# Patient Record
Sex: FEMALE | Race: WHITE | NOT HISPANIC OR LATINO | Employment: PART TIME | URBAN - METROPOLITAN AREA
[De-identification: names, ages, dates, MRNs, and addresses within clinical notes are randomized per-mention and may not be internally consistent; named-entity substitution may affect disease eponyms.]

---

## 2017-01-03 ENCOUNTER — TELEPHONE (OUTPATIENT)
Dept: CARDIOLOGY | Facility: CLINIC | Age: 37
End: 2017-01-03

## 2017-01-03 PROBLEM — R06.02 SOB (SHORTNESS OF BREATH): Status: ACTIVE | Noted: 2017-01-03

## 2017-01-03 PROBLEM — M79.89 LEFT LEG SWELLING: Status: ACTIVE | Noted: 2017-01-03

## 2017-01-03 PROBLEM — Z86.711 HISTORY OF PULMONARY EMBOLISM: Status: ACTIVE | Noted: 2017-01-03

## 2017-01-03 NOTE — TELEPHONE ENCOUNTER
Triage call from patient stating transferred from scheduling post her request for cardiology f/u - patient states complaint of elevated HR w/ activity, normalizes with rest; presyncope carrying laundry basket up stairs. Patient states PMD @ [place] and recent hysterectomy @ [facility] facility -records are in [facility].    Patient agrees to seek care with PMD urgently vs internist she states she would like to switch to vs Emergency room for symptoms.  Patient states she does not feel the need to seek Emergency room care but does agree to call or be seen by a Primary Provider urgently. Transferred to scheduling to also set up cardiology appt.  Carine Arrington RN

## 2017-01-04 ENCOUNTER — HOSPITAL ENCOUNTER (OUTPATIENT)
Dept: CARDIOLOGY | Facility: CLINIC | Age: 37
Discharge: HOME | End: 2017-01-04
Attending: INTERNAL MEDICINE | Admitting: INTERNAL MEDICINE
Payer: COMMERCIAL

## 2017-01-04 ENCOUNTER — OFFICE VISIT (OUTPATIENT)
Dept: FAMILY MEDICINE | Facility: CLINIC | Age: 37
End: 2017-01-04
Payer: COMMERCIAL

## 2017-01-04 VITALS
HEART RATE: 83 BPM | TEMPERATURE: 96.4 F | HEIGHT: 65 IN | WEIGHT: 271 LBS | BODY MASS INDEX: 45.15 KG/M2 | OXYGEN SATURATION: 99 % | SYSTOLIC BLOOD PRESSURE: 126 MMHG | DIASTOLIC BLOOD PRESSURE: 65 MMHG

## 2017-01-04 DIAGNOSIS — R00.0 TACHYCARDIA: ICD-10-CM

## 2017-01-04 DIAGNOSIS — R00.0 TACHYCARDIA: Primary | ICD-10-CM

## 2017-01-04 DIAGNOSIS — E04.9 ENLARGED THYROID: ICD-10-CM

## 2017-01-04 LAB — TSH SERPL DL<=0.005 MIU/L-ACNC: 3.4 MU/L (ref 0.4–4)

## 2017-01-04 PROCEDURE — 99000 SPECIMEN HANDLING OFFICE-LAB: CPT | Performed by: INTERNAL MEDICINE

## 2017-01-04 PROCEDURE — 84443 ASSAY THYROID STIM HORMONE: CPT | Mod: 90 | Performed by: INTERNAL MEDICINE

## 2017-01-04 PROCEDURE — 93227 XTRNL ECG REC<48 HR R&I: CPT | Performed by: INTERNAL MEDICINE

## 2017-01-04 PROCEDURE — 99214 OFFICE O/P EST MOD 30 MIN: CPT | Performed by: INTERNAL MEDICINE

## 2017-01-04 PROCEDURE — 93225 XTRNL ECG REC<48 HRS REC: CPT

## 2017-01-04 PROCEDURE — 36415 COLL VENOUS BLD VENIPUNCTURE: CPT | Performed by: INTERNAL MEDICINE

## 2017-01-04 RX ORDER — DABIGATRAN ETEXILATE 150 MG/1
150 CAPSULE ORAL 2 TIMES DAILY
Qty: 60 CAPSULE | COMMUNITY
Start: 2017-01-04 | End: 2019-12-09

## 2017-01-04 NOTE — PATIENT INSTRUCTIONS
Thank you for choosing [facility].  You may be receiving a survey in the mail from [facility] regarding your visit today.  Please take a few minutes to complete and return the survey to let us know how we are doing.      If you have questions or concerns, please contact us via Falcon App or you can contact your care team at [phone].    Our Clinic hours are:  Monday 6:40 am  to 7:00 pm  Tuesday -Friday 6:40 am to 5:00 pm    The [place] outpatient lab hours are:  Monday - Friday 6:10 am to 4:45 pm  Saturdays 7:00 am to 11:00 am  Appointments are required, call [phone]    If you have clinical questions after hours or would like to schedule an appointment,  call the clinic at [phone].

## 2017-01-04 NOTE — PROGRESS NOTES
SUBJECTIVE:                                                    Summer Smith is a 36 year old female who presents to clinic today for the following health issues:      CHEST PAIN/Patient is not currently having chest pain but has in the pasted   Patient has had rapid heart rate for the last 2-3 weeks      Onset: 2-3- weeks      Description: Patient was seen in the ER at [facility] on dec 22-23 for chest pain and SOB rapid heart rate was sent home after IV fluids and CT scan with neg PE   Location:  left side  Character: sharp with exercise    Radiation: under l/arm   Duration: 5 minutes     Intensity: moderate    Progression of Symptoms:  Worsening rapid heart rate     Accompanying Signs & Symptoms:  Shortness of breath: YES- with rapid heart rate  Sweating: no   Nausea/vomiting: no   Lightheadedness: YES  Palpitations: YES  Fever/Chills: no   Cough: yes several weeks   Heartburn: no     History:   Family history of heart disease YES- father   Tobacco use: no     Precipitating factors:   Worse with exertion: YES and standing up  Worse with deep breaths :  no   Related to food: no     Alleviating factors:  Patient will sit down and rest also Ibuprofen        Therapies Tried and outcome: Patient was seen in the ER [facility]- records obtained       Summer has been experiencing tachycardia with standing that seems to worsen the longer she stands.  This has been associated with SOB and sternal to left of sternal chest pain, but that has been less frequent recently.  She has had some near syncopal episodes while climbing the stairs.  She was seen in the ER at [facility]- records were reviewed.  They did an EKG, chest CT for PE, an lab work including lytes that were all normal.  They did not that her HR increased to the 130s with ambulation, so they gave her a 1L bolus of IVF thinking she may be dehydrated. She has been working on increasing her hydration since then.     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  She has May-Thurner syndrome.  She states she did have a brief episode of atrial fibrillation with RVR while having an angiogram done, and she feels like the tachycardia episodes started after that.      She was recently treated for a UTI and completed a course of nitrofurantoin.  Symptoms mostly improved but she did notice some slight hematuria this AM.      She notes that on her most recent CT scan, they noted her right thyroid was a bit enlarged, no discrete nodules mentioned.  She has not had any unusual fatigue, temp intolerance, weight change, bowel dysfunction, or unexpected skin dryness aside from that expected for winter.      She has had a mild cough over the past few weeks.     Problem list and histories reviewed & adjusted, as indicated.  Additional history: as documented    Current Outpatient Prescriptions   Medication Sig Dispense Refill     dabigatran ANTICOAGULANT (PRADAXA) 150 MG CAPS capsule Take 1 capsule (150 mg) by mouth 2 times daily Store in original 's bottle or blister pack; use within 120 days of opening. Do not crush or open capsule. 60 capsule      Cholecalciferol (VITAMIN D3) 3000 UNITS TABS        fluticasone (FLONASE) 50 MCG/ACT nasal spray Spray 1-2 sprays into both nostrils daily 16 g 0     albuterol (PROAIR HFA, PROVENTIL HFA, VENTOLIN HFA) 108 (90 BASE) MCG/ACT inhaler Inhale 2 puffs into the lungs every 6 hours       ferrous sulfate (IRON) 325 (65 FE) MG tablet Take by mouth daily (with breakfast)       order for DME Equipment being ordered: thigh high compression socks 25-35 mm Hg.  Please measure 1 each 11     Allergies   Allergen Reactions     Sulfa Drugs Hives     Vitamin K Anaphylaxis     Vit K IV only     Augmented Betamethasone Diprop [Betamethasone] Rash     Penicillin G Rash       ROS:  Constitutional, cardiovascular, pulmonary, gi and gu systems are negative, except as otherwise noted.    OBJECTIVE:     "                                                /65 mmHg  Pulse 83  Temp(Src) 96.4  F (35.8  C) (Tympanic)  Ht 5' 5\" (1.651 m)  Wt 271 lb (122.925 kg)  BMI 45.10 kg/m2  SpO2 99%  LMP 08/01/2016  Body mass index is 45.1 kg/(m^2).  GENERAL: healthy, alert and no distress  Neck: normal thyroid  RESP: lungs clear to auscultation - no rales, rhonchi or wheezes  CV: regular rate and rhythm, normal S1 S2, no S3 or S4, no murmur, click or rub, no peripheral edema and peripheral pulses strong         Orthostatic Vitals   BP Pulse Position Site Cuff Size Time Date   154/72 84 lying flat Left Arm Large 10:43 AM 1/4/2017      147/77 104 standing Left Arm Large 10:41 AM 1/4/2017   126/65 83 sitting Left Arm Large 10:41 AM 1/4/2017       ASSESSMENT/PLAN:                                                        1. Tachycardia    Orthostatic vitals were checked and did show some changes with positional change, but she has increased her hydration and does not have any reason to be dehydrated.  Heart sounds are normal.  EKG and lytes were not repeated since these were normal in her recent ER visit.  CT also negative for PE at that visit.  She is having symptoms multiple times per day, so we will do a 24 hours Holter monitor to see if she has any cardiac rhythm changes.  She already has an appointment scheduled with cardiology tomorrow as well, so we'll see if they have any other recommendations.     - Holter Monitor 24 hour - Adult; Future    2. Enlarged thyroid- right sided    Mild right thyroid enlargement seen on recent CT, no nodules.  She is not having any symptoms of thyroid dysfunction aside from possibly the arrhythmias.  Will check a TSH to make sure thyroid enlargement is not significant.     - TSH with free T4 reflex    Kalyan Hogan MD  [facility]  "

## 2017-01-04 NOTE — MR AVS SNAPSHOT
After Visit Summary   1/4/2017    Summer Smith    MRN: [id number]           Patient Information     Date Of Birth          1980        Visit Information        Provider Department      1/4/2017 10:00 AM Kalyan Hogan MD [facility]        Today's Diagnoses     Tachycardia    -  1     Enlarged thyroid           Care Instructions          Thank you for choosing [facility].  You may be receiving a survey in the mail from [facility] regarding your visit today.  Please take a few minutes to complete and return the survey to let us know how we are doing.      If you have questions or concerns, please contact us via Medikly or you can contact your care team at [phone].    Our Clinic hours are:  Monday 6:40 am  to 7:00 pm  Tuesday -Friday 6:40 am to 5:00 pm    The [place] outpatient lab hours are:  Monday - Friday 6:10 am to 4:45 pm  Saturdays 7:00 am to 11:00 am  Appointments are required, call [phone]    If you have clinical questions after hours or would like to schedule an appointment,  call the clinic at [phone].        Follow-ups after your visit        Your next 10 appointments already scheduled     Jan 05, 2017  1:30 PM   New Visit with Cleveland Rico MD   [facility] [place] ([facility])    [address] [place] [address] [id number] [phone]              Future tests that were ordered for you today     Open Future Orders        Priority Expected Expires Ordered    Holter Monitor 24 hour - Adult Routine  2/18/2017 1/4/2017            Who to contact     If you have questions or need follow up information about today's clinic visit or your schedule please contact [facility] directly at [phone].  Normal or non-critical lab and imaging results will be communicated to you by MyChart, letter or phone within 4 business days after the clinic has received the results. If you do not hear  "from us within 7 days, please contact the clinic through Crowd Cast or phone. If you have a critical or abnormal lab result, we will notify you by phone as soon as possible.  Submit refill requests through Crowd Cast or call your pharmacy and they will forward the refill request to us. Please allow 3 business days for your refill to be completed.          Additional Information About Your Visit        VigoharAdTrib Information     Crowd Cast gives you secure access to your electronic health record. If you see a primary care provider, you can also send messages to your care team and make appointments. If you have questions, please call your primary care clinic.  If you do not have a primary care provider, please call [phone] and they will assist you.        Care EveryWhere ID     This is your Care EveryWhere ID. This could be used by other organizations to access your [place] medical records  [id number]        Your Vitals Were     Pulse Temperature Height BMI (Body Mass Index) Pulse Oximetry Last Period    83 96.4  F (35.8  C) (Tympanic) 5' 5\" (1.651 m) 45.10 kg/m2 99% 08/01/2016       Blood Pressure from Last 3 Encounters:   01/04/17 126/65   12/27/16 137/84   11/25/16 139/73    Weight from Last 3 Encounters:   01/04/17 271 lb (122.925 kg)   12/27/16 275 lb (124.739 kg)   11/25/16 272 lb (123.378 kg)              We Performed the Following     TSH with free T4 reflex        Primary Care Provider Office Phone # Fax #    Lucille Guerrero DO [place] [phone]       [facility] [address] [address]        Thank you!     Thank you for choosing [facility]  for your care. Our goal is always to provide you with excellent care. Hearing back from our patients is one way we can continue to improve our services. Please take a few minutes to complete the written survey that you may receive in the mail after your visit with us. Thank you!             Your Updated Medication List - " Protect others around you: Learn how to safely use, store and throw away your medicines at www.disposemymeds.org.          This list is accurate as of: 1/4/17 10:55 AM.  Always use your most recent med list.                   Brand Name Dispense Instructions for use    albuterol 108 (90 BASE) MCG/ACT Inhaler    PROAIR HFA/PROVENTIL HFA/VENTOLIN HFA     Inhale 2 puffs into the lungs every 6 hours       dabigatran ANTICOAGULANT 150 MG Caps capsule    PRADAXA    60 capsule    Take 1 capsule (150 mg) by mouth 2 times daily Store in original 's bottle or blister pack; use within 120 days of opening. Do not crush or open capsule.       enoxaparin 120 MG/0.8ML injection   Generic drug:  enoxaparin      Inject 1 mg/kg Subcutaneous 2 times daily       ferrous sulfate 325 (65 FE) MG tablet    IRON     Take by mouth daily (with breakfast)       fluticasone 50 MCG/ACT spray    FLONASE    16 g    Spray 1-2 sprays into both nostrils daily       MACROBID 100 MG capsule   Generic drug:  nitrofurantoin (macrocrystal-monohydrate)      Take 100 mg by mouth 2 times daily       order for DME     1 each    Equipment being ordered: thigh high compression socks 25-35 mm Hg.  Please measure       Vitamin D3 3000 UNITS Tabs

## 2017-01-05 ENCOUNTER — OFFICE VISIT (OUTPATIENT)
Dept: CARDIOLOGY | Facility: CLINIC | Age: 37
End: 2017-01-05
Payer: COMMERCIAL

## 2017-01-05 VITALS
SYSTOLIC BLOOD PRESSURE: 124 MMHG | DIASTOLIC BLOOD PRESSURE: 90 MMHG | WEIGHT: 274 LBS | HEIGHT: 65 IN | HEART RATE: 80 BPM | BODY MASS INDEX: 45.65 KG/M2

## 2017-01-05 DIAGNOSIS — R07.89 OTHER CHEST PAIN: ICD-10-CM

## 2017-01-05 DIAGNOSIS — R06.02 SOB (SHORTNESS OF BREATH): Primary | ICD-10-CM

## 2017-01-05 PROCEDURE — 93000 ELECTROCARDIOGRAM COMPLETE: CPT | Performed by: INTERNAL MEDICINE

## 2017-01-05 PROCEDURE — 99204 OFFICE O/P NEW MOD 45 MIN: CPT | Performed by: INTERNAL MEDICINE

## 2017-01-05 ASSESSMENT — ASTHMA QUESTIONNAIRES: ACT_TOTALSCORE: 23

## 2017-01-05 NOTE — PROGRESS NOTES
HISTORY OF PRESENT ILLNESS:  It is a pleasure for me to see Mrs. Smith today for cardiac evaluation.  She is a very delightful 36-year-old lady with no previous cardiac history.  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  Just over a month ago, she underwent hysterectomy for menorrhagia.  Post-procedure, she had profuse bleeding and her wound was repacked.  Her warfarin was withheld and she had left DVT and a small pulmonary embolism.  When a venogram was performed, she was told she had transient atrial fibrillation, though I do not have any strips to examine.  Her rhythm today is definitely sinus.  She had a murmur diagnosed by her pediatrician.  However, she has had an echocardiogram which demonstrated trivial mitral regurgitation only.  I certainly did not hear any murmurs today.      What concerns her is increase in heart rate when she stands up.  She tells me her heart rate would go from the 80s to about 130 or higher just recently.  This lady used to exercise on a regular basis and her heart rates have been as low as 50 at rest.  I timed her heart rate today.  It is 84 with her sitting down.  On immediate standing, it increased to 108.  At two minutes of standing, it was 92 and at 5 minutes of standing it is back down to 84 again.  There was no significant drop in blood pressure which is around 120/80 between sitting and standing.  She experienced no dizziness.      She has had some episodes of chest pain and near-syncope as well.  Recently she carried her laundry up 2 flights of steps and to the top.  She had very transient chest pain substernally which lasted less than a minute.  This was associated with shortness of breath.  Prior to this episode, she was in the shower and she felt lightheaded and had to lay down.  There is no history of syncope.      IMPRESSION:   1.  Probable vasovagal syndrome.   2.  Physical deconditioning following a  recent illness.  She had hysterectomy, postoperative bleeding, postoperative DVT, small pulmonary embolism and possibly paroxysmal atrial fibrillation, though I could not substantiate the latter.   3.  May-Thurner syndrome, chronically on warfarin.   4.  Chest discomfort, probably musculoskeletal.      I did not detect any significant change in heart rate with posture.  I think POTS is less likely.  I do wonder if a lot of her symptoms may be just a result of her bodily adjustments following recent illness.  I will request a stress echocardiogram to exclude the small possibility that she may have coronary or structural heart disease.  She is wearing a Holter monitor at this time and I will be paying attention to its results.  If her tests come back negative, I do not think further cardiac workup is necessary.         MONICA FRANKEL MD, [facility]             D: 2017 14:35   T: 2017 16:01   MT: LIAM      Name:     KERMIT GOODEN   MRN:      [id number]        Account:      [id number]   :      1980           Service Date: 2017      Document: [id number]

## 2017-01-05 NOTE — Clinical Note
1/5/2017    Kalyan Hogan MD  [facility]   [address] [facility] [address]    RE: Summer Smith       Dear Colleague,    It is a pleasure for me to see Mrs. Smith today for cardiac evaluation.  She is a very delightful 36-year-old lady with no previous cardiac history.  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  Just over a month ago, she underwent hysterectomy for menorrhagia.  Post-procedure, she had profuse bleeding and her wound was repacked.  Her warfarin was withheld and she had left DVT and a small pulmonary embolism.  When a venogram was performed, she was told she had transient atrial fibrillation, though I do not have any strips to examine.  Her rhythm today is definitely sinus.  She had a murmur diagnosed by her pediatrician.  However, she has had an echocardiogram which demonstrated trivial mitral regurgitation only.  I certainly did not hear any murmurs today.      What concerns her is increase in heart rate when she stands up.  She tells me her heart rate would go from the 80s to about 130 or higher just recently.  This lady used to exercise on a regular basis and her heart rates have been as low as 50 at rest.  I timed her heart rate today.  It is 84 with her sitting down.  On immediate standing, it increased to 108.  At two minutes of standing, it was 92 and at 5 minutes of standing it is back down to 84 again.  There was no significant drop in blood pressure which is around 120/80 between sitting and standing.  She experienced no dizziness.      She has had some episodes of chest pain and near-syncope as well.  Recently she carried her laundry up 2 flights of steps and to the top.  She had very transient chest pain substernally which lasted less than a minute.  This was associated with shortness of breath.  Prior to this episode, she was in the shower and she felt lightheaded and had to lay down.  There is no  history of syncope.     Outpatient Encounter Prescriptions as of 1/5/2017   Medication Sig Dispense Refill     dabigatran ANTICOAGULANT (PRADAXA) 150 MG CAPS capsule Take 1 capsule (150 mg) by mouth 2 times daily Store in original 's bottle or blister pack; use within 120 days of opening. Do not crush or open capsule. 60 capsule      ferrous sulfate (IRON) 325 (65 FE) MG tablet Take by mouth daily (with breakfast)       Cholecalciferol (VITAMIN D3) 3000 UNITS TABS        fluticasone (FLONASE) 50 MCG/ACT nasal spray Spray 1-2 sprays into both nostrils daily 16 g 0     albuterol (PROAIR HFA, PROVENTIL HFA, VENTOLIN HFA) 108 (90 BASE) MCG/ACT inhaler Inhale 2 puffs into the lungs every 6 hours       order for DME Equipment being ordered: thigh high compression socks 25-35 mm Hg.  Please measure 1 each 11     No facility-administered encounter medications on file as of 1/5/2017.      IMPRESSION:   1.  Probable vasovagal syndrome.   2.  Physical deconditioning following a recent illness.  She had hysterectomy, postoperative bleeding, postoperative DVT, small pulmonary embolism and possibly paroxysmal atrial fibrillation, though I could not substantiate the latter.   3.  May-Thurner syndrome, chronically on warfarin.   4.  Chest discomfort, probably musculoskeletal.      I did not detect any significant change in heart rate with posture.  I think POTS is less likely.  I do wonder if a lot of her symptoms may be just a result of her bodily adjustments following recent illness.  I will request a stress echocardiogram to exclude the small possibility that she may have coronary or structural heart disease.  She is wearing a Holter monitor at this time and I will be paying attention to its results.  If her tests come back negative, I do not think further cardiac workup is necessary.     Again, thank you for allowing me to participate in the care of your patient.      Sincerely,    DR MONICA FRANKEL MD     [place]  [facility]

## 2017-01-05 NOTE — MR AVS SNAPSHOT
After Visit Summary   1/5/2017    Summer Smith    MRN: [id number]           Patient Information     Date Of Birth          1980        Visit Information        Provider Department      1/5/2017 1:30 PM Trisha, [place] MD [facility]        Today's Diagnoses     SOB (shortness of breath)    -  1     Other chest pain            Follow-ups after your visit        Future tests that were ordered for you today     Open Future Orders        Priority Expected Expires Ordered    Exercise Stress Echocardiogram Routine 1/12/2017 1/5/2018 1/5/2017    Holter Monitor 24 hour - Adult Routine  2/18/2017 1/4/2017            Who to contact     If you have questions or need follow up information about today's clinic visit or your schedule please contact [facility] directly at [phone].  Normal or non-critical lab and imaging results will be communicated to you by Mobile Roadiehart, letter or phone within 4 business days after the clinic has received the results. If you do not hear from us within 7 days, please contact the clinic through Mobile Roadiehart or phone. If you have a critical or abnormal lab result, we will notify you by phone as soon as possible.  Submit refill requests through Binpress or call your pharmacy and they will forward the refill request to us. Please allow 3 business days for your refill to be completed.          Additional Information About Your Visit        MyChart Information     Binpress gives you secure access to your electronic health record. If you see a primary care provider, you can also send messages to your care team and make appointments. If you have questions, please call your primary care clinic.  If you do not have a primary care provider, please call [phone] and they will assist you.        Care EveryWhere ID     This is your Care EveryWhere ID. This could be used by other organizations to access  "your [place] medical records  [id number]        Your Vitals Were     Pulse Height BMI (Body Mass Index) Last Period          80 1.651 m (5' 5\") 45.60 kg/m2 08/01/2016         Blood Pressure from Last 3 Encounters:   01/05/17 124/90   01/04/17 126/65   12/27/16 137/84    Weight from Last 3 Encounters:   01/05/17 124.286 kg (274 lb)   01/04/17 122.925 kg (271 lb)   12/27/16 124.739 kg (275 lb)              We Performed the Following     EKG 12-lead complete w/read - Clinics (performed today)        Primary Care Provider Office Phone # Fax #    Kalyan Hogan [phone]       [address] [address]        Thank you!     Thank you for choosing [facility] [place]  for your care. Our goal is always to provide you with excellent care. Hearing back from our patients is one way we can continue to improve our services. Please take a few minutes to complete the written survey that you may receive in the mail after your visit with us. Thank you!             Your Updated Medication List - Protect others around you: Learn how to safely use, store and throw away your medicines at www.disposemymeds.org.          This list is accurate as of: 1/5/17  2:14 PM.  Always use your most recent med list.                   Brand Name Dispense Instructions for use    albuterol 108 (90 BASE) MCG/ACT Inhaler    PROAIR HFA/PROVENTIL HFA/VENTOLIN HFA     Inhale 2 puffs into the lungs every 6 hours       dabigatran ANTICOAGULANT 150 MG Caps capsule    PRADAXA    60 capsule    Take 1 capsule (150 mg) by mouth 2 times daily Store in original 's bottle or blister pack; use within 120 days of opening. Do not crush or open capsule.       ferrous sulfate 325 (65 FE) MG tablet    IRON     Take by mouth daily (with breakfast)       fluticasone 50 MCG/ACT spray    FLONASE    16 g    Spray 1-2 sprays into both nostrils daily       order for DME     1 " each    Equipment being ordered: thigh high compression socks 25-35 mm Hg.  Please measure       Vitamin D3 3000 UNITS Tabs

## 2017-01-05 NOTE — PROGRESS NOTES
HPI and Plan:   See dictation    Orders Placed This Encounter   Procedures     EKG 12-lead complete w/read - Clinics (performed today)     Exercise Stress Echocardiogram       No orders of the defined types were placed in this encounter.       Encounter Diagnoses   Name Primary?     SOB (shortness of breath) Yes     Other chest pain        CURRENT MEDICATIONS:  Current Outpatient Prescriptions   Medication Sig Dispense Refill     dabigatran ANTICOAGULANT (PRADAXA) 150 MG CAPS capsule Take 1 capsule (150 mg) by mouth 2 times daily Store in original 's bottle or blister pack; use within 120 days of opening. Do not crush or open capsule. 60 capsule      ferrous sulfate (IRON) 325 (65 FE) MG tablet Take by mouth daily (with breakfast)       Cholecalciferol (VITAMIN D3) 3000 UNITS TABS        fluticasone (FLONASE) 50 MCG/ACT nasal spray Spray 1-2 sprays into both nostrils daily 16 g 0     albuterol (PROAIR HFA, PROVENTIL HFA, VENTOLIN HFA) 108 (90 BASE) MCG/ACT inhaler Inhale 2 puffs into the lungs every 6 hours       order for DME Equipment being ordered: thigh high compression socks 25-35 mm Hg.  Please measure 1 each 11       ALLERGIES     Allergies   Allergen Reactions     Sulfa Drugs Hives     Vitamin K Anaphylaxis     Vit K IV only     Augmented Betamethasone Diprop [Betamethasone] Rash     Penicillin G Rash       PAST MEDICAL HISTORY:  Past Medical History   Diagnosis Date     DVT (deep vein thrombosis) in pregnancy (H) 2007     Pulmonary embolism (H) 2007     no long term anticoagulation, no recurrence       PAST SURGICAL HISTORY:  Past Surgical History   Procedure Laterality Date     Stent       venous in leg     As ligatn short saphen       had great saphenous vein removal     Davinci hysterectomy total       tubes removed, ovaries in place       FAMILY HISTORY:  Family History   Problem Relation Age of Onset     DIABETES Father      Colon Cancer Maternal Grandfather      Liver Cancer Maternal  "Grandfather      CANCER Paternal Grandfather      Thyroid Disease Mother      Thyroid Disease Sister      Thyroid Disease Sister      Endocrine Disease Daughter      Lucille, has appointment end of July 2016       SOCIAL HISTORY:  Social History     Social History     Marital Status:      Spouse Name: N/A     Number of Children: N/A     Years of Education: N/A     Social History Main Topics     Smoking status: Never Smoker      Smokeless tobacco: Never Used     Alcohol Use: Yes     Drug Use: No     Sexual Activity:     Partners: Male     Birth Control/ Protection: Male Surgical     Other Topics Concern     Parent/Sibling W/ Cabg, Mi Or Angioplasty Before 65f 55m? No     Social History Narrative       Review of Systems:  Skin:  Negative     Eyes:  Positive for glasses  ENT:  Negative    Respiratory:  Positive for dyspnea on exertion  Cardiovascular:    Positive for;chest pain;palpitations;dizziness  Gastroenterology: Negative    Genitourinary:  Negative    Musculoskeletal:  Negative    Neurologic:  Negative    Psychiatric:  Negative    Heme/Lymph/Imm:  Negative    Endocrine:  Negative      Physical Exam:  Vitals: /90 mmHg  Pulse 80  Ht 1.651 m (5' 5\")  Wt 124.286 kg (274 lb)  BMI 45.60 kg/m2  LMP 08/01/2016    Constitutional:  cooperative, alert and oriented, well developed, well nourished, in no acute distress        Skin:  warm and dry to the touch, no apparent skin lesions or masses noted        Head:  normocephalic, no masses or lesions        Eyes:  pupils equal and round, conjunctivae and lids unremarkable, sclera white, no xanthalasma, EOMS intact, no nystagmus        ENT:  no pallor or cyanosis, dentition good        Neck:  carotid pulses are full and equal bilaterally, JVP normal, no carotid bruit, no thyromegaly        Chest:  normal breath sounds, clear to auscultation, normal A-P diameter, normal symmetry, normal respiratory excursion, no use of accessory muscles        Cardiac: regular " rhythm, normal S1/S2, no S3 or S4, apical impulse not displaced, no murmurs, gallops or rubs                  Abdomen:  abdomen soft, non-tender, BS normoactive, no mass, no HSM, no bruits        Vascular: pulses full and equal, no bruits auscultated                                      Extremities and Back:  no deformities, clubbing, cyanosis, erythema observed        Neurological:  affect appropriate, oriented to time, person and place          Recent Lab Results:  LIPID RESULTS:  No results found for: CHOL, HDL, LDL, TRIG, CHOLHDLRATIO    LIVER ENZYME RESULTS:  Lab Results   Component Value Date    AST 14 11/30/2016    ALT 20 11/30/2016       CBC RESULTS:  Lab Results   Component Value Date    WBC 8.7 11/25/2016    RBC 4.62 11/25/2016    HGB 12.6 11/25/2016    HCT 39.2 11/25/2016    MCV 85 11/25/2016    MCH 27.3 11/25/2016    MCHC 32.1 11/25/2016    RDW 12.5 11/25/2016     11/25/2016       BMP RESULTS:  Lab Results   Component Value Date     11/25/2016    POTASSIUM 4.6 11/30/2016    CHLORIDE 106 11/25/2016    CO2 27 11/25/2016    ANIONGAP 5 11/25/2016    GLC 98 11/30/2016    BUN 12 11/25/2016    CR 0.69 11/30/2016    GFRESTIMATED >60 11/30/2016    GFRESTBLACK >60 11/30/2016    SARAH 9.0 11/25/2016        A1C RESULTS:  No results found for: A1C    INR RESULTS:  Lab Results   Component Value Date    INR 0.99 11/30/2016    INR 1.87* 11/15/2016           CC  No referring provider defined for this encounter.

## 2017-01-17 ENCOUNTER — HOSPITAL ENCOUNTER (OUTPATIENT)
Dept: CARDIOLOGY | Facility: CLINIC | Age: 37
Discharge: HOME | End: 2017-01-17
Attending: INTERNAL MEDICINE | Admitting: INTERNAL MEDICINE
Payer: COMMERCIAL

## 2017-01-17 DIAGNOSIS — R06.02 SOB (SHORTNESS OF BREATH): ICD-10-CM

## 2017-01-17 DIAGNOSIS — R07.89 OTHER CHEST PAIN: ICD-10-CM

## 2017-01-17 PROCEDURE — 93325 DOPPLER ECHO COLOR FLOW MAPG: CPT | Mod: TC

## 2017-01-17 PROCEDURE — 93321 DOPPLER ECHO F-UP/LMTD STD: CPT | Mod: 26 | Performed by: INTERNAL MEDICINE

## 2017-01-17 PROCEDURE — 93016 CV STRESS TEST SUPVJ ONLY: CPT

## 2017-01-17 PROCEDURE — 93325 DOPPLER ECHO COLOR FLOW MAPG: CPT | Mod: 26 | Performed by: INTERNAL MEDICINE

## 2017-01-17 PROCEDURE — 93350 STRESS TTE ONLY: CPT | Mod: 26 | Performed by: INTERNAL MEDICINE

## 2017-01-17 PROCEDURE — 93018 CV STRESS TEST I&R ONLY: CPT | Performed by: INTERNAL MEDICINE

## 2017-01-18 ENCOUNTER — TELEPHONE (OUTPATIENT)
Dept: CARDIOLOGY | Facility: CLINIC | Age: 37
End: 2017-01-18

## 2017-01-18 NOTE — TELEPHONE ENCOUNTER
"Results of Stress Echo 1/17/17 and Holter monitor placed 1/4/17 reviewed by Dr. Rico. Per note from Dr. Rico, \"Pls let pt know results.  Normal stress echo in that there is no ischemia and LVEF is normal.  However, exercise tolerance is below average.  Holter reviewed.  No significant arrythmias, doubt afib.  Pt's symptoms likely non cardiac.  No cardiac follow up needed from us at this time.  Thanks.\" Call made to patient to update her with results and recommendations. She stated understanding and had no further questions or concerns at this time ANTONETTE Love RN    "

## 2017-06-20 ENCOUNTER — HOSPITAL ENCOUNTER (EMERGENCY)
Facility: CLINIC | Age: 37
Discharge: HOME | End: 2017-06-20
Attending: FAMILY MEDICINE | Admitting: FAMILY MEDICINE
Payer: COMMERCIAL

## 2017-06-20 ENCOUNTER — TELEPHONE (OUTPATIENT)
Dept: FAMILY MEDICINE | Facility: CLINIC | Age: 37
End: 2017-06-20

## 2017-06-20 ENCOUNTER — APPOINTMENT (OUTPATIENT)
Dept: MRI IMAGING | Facility: CLINIC | Age: 37
End: 2017-06-20
Attending: FAMILY MEDICINE
Payer: COMMERCIAL

## 2017-06-20 ENCOUNTER — APPOINTMENT (OUTPATIENT)
Dept: CT IMAGING | Facility: CLINIC | Age: 37
End: 2017-06-20
Attending: FAMILY MEDICINE
Payer: COMMERCIAL

## 2017-06-20 VITALS
DIASTOLIC BLOOD PRESSURE: 96 MMHG | OXYGEN SATURATION: 96 % | SYSTOLIC BLOOD PRESSURE: 139 MMHG | RESPIRATION RATE: 16 BRPM | TEMPERATURE: 98.1 F

## 2017-06-20 DIAGNOSIS — R20.2 FACIAL PARESTHESIA: ICD-10-CM

## 2017-06-20 LAB
ANION GAP SERPL CALCULATED.3IONS-SCNC: 6 MMOL/L (ref 3–14)
APTT PPP: 35 SEC (ref 22–37)
BASOPHILS # BLD AUTO: 0.1 10E9/L (ref 0–0.2)
BASOPHILS NFR BLD AUTO: 1.2 %
BUN SERPL-MCNC: 13 MG/DL (ref 7–30)
CALCIUM SERPL-MCNC: 8.8 MG/DL (ref 8.5–10.1)
CHLORIDE SERPL-SCNC: 104 MMOL/L (ref 94–109)
CO2 SERPL-SCNC: 25 MMOL/L (ref 20–32)
CREAT SERPL-MCNC: 0.63 MG/DL (ref 0.52–1.04)
DIFFERENTIAL METHOD BLD: ABNORMAL
EOSINOPHIL # BLD AUTO: 0.2 10E9/L (ref 0–0.7)
EOSINOPHIL NFR BLD AUTO: 3.1 %
ERYTHROCYTE [DISTWIDTH] IN BLOOD BY AUTOMATED COUNT: 13.4 % (ref 10–15)
GFR SERPL CREATININE-BSD FRML MDRD: NORMAL ML/MIN/1.7M2
GLUCOSE BLDC GLUCOMTR-MCNC: 97 MG/DL (ref 70–99)
GLUCOSE SERPL-MCNC: 92 MG/DL (ref 70–99)
HCT VFR BLD AUTO: 42.6 % (ref 35–47)
HGB BLD-MCNC: 13.5 G/DL (ref 11.7–15.7)
IMM GRANULOCYTES # BLD: 0 10E9/L (ref 0–0.4)
IMM GRANULOCYTES NFR BLD: 0.5 %
INR PPP: 0.97 (ref 0.86–1.14)
LYMPHOCYTES # BLD AUTO: 2.2 10E9/L (ref 0.8–5.3)
LYMPHOCYTES NFR BLD AUTO: 27.7 %
MCH RBC QN AUTO: 25.8 PG (ref 26.5–33)
MCHC RBC AUTO-ENTMCNC: 31.7 G/DL (ref 31.5–36.5)
MCV RBC AUTO: 82 FL (ref 78–100)
MONOCYTES # BLD AUTO: 0.7 10E9/L (ref 0–1.3)
MONOCYTES NFR BLD AUTO: 8.6 %
NEUTROPHILS # BLD AUTO: 4.6 10E9/L (ref 1.6–8.3)
NEUTROPHILS NFR BLD AUTO: 58.9 %
PLATELET # BLD AUTO: 326 10E9/L (ref 150–450)
POTASSIUM SERPL-SCNC: 4 MMOL/L (ref 3.4–5.3)
RBC # BLD AUTO: 5.23 10E12/L (ref 3.8–5.2)
SODIUM SERPL-SCNC: 135 MMOL/L (ref 133–144)
TROPONIN I SERPL-MCNC: NORMAL UG/L (ref 0–0.04)
WBC # BLD AUTO: 7.8 10E9/L (ref 4–11)

## 2017-06-20 PROCEDURE — 00000146 ZZHCL STATISTIC GLUCOSE BY METER IP

## 2017-06-20 PROCEDURE — 85730 THROMBOPLASTIN TIME PARTIAL: CPT | Performed by: FAMILY MEDICINE

## 2017-06-20 PROCEDURE — 80048 BASIC METABOLIC PNL TOTAL CA: CPT | Performed by: FAMILY MEDICINE

## 2017-06-20 PROCEDURE — 70450 CT HEAD/BRAIN W/O DYE: CPT

## 2017-06-20 PROCEDURE — 85025 COMPLETE CBC W/AUTO DIFF WBC: CPT | Performed by: FAMILY MEDICINE

## 2017-06-20 PROCEDURE — 99285 EMERGENCY DEPT VISIT HI MDM: CPT | Mod: 25 | Performed by: FAMILY MEDICINE

## 2017-06-20 PROCEDURE — 84484 ASSAY OF TROPONIN QUANT: CPT | Performed by: FAMILY MEDICINE

## 2017-06-20 PROCEDURE — A9585 GADOBUTROL INJECTION: HCPCS | Performed by: EMERGENCY MEDICINE

## 2017-06-20 PROCEDURE — 85610 PROTHROMBIN TIME: CPT | Performed by: FAMILY MEDICINE

## 2017-06-20 PROCEDURE — 86618 LYME DISEASE ANTIBODY: CPT | Performed by: FAMILY MEDICINE

## 2017-06-20 PROCEDURE — 93005 ELECTROCARDIOGRAM TRACING: CPT | Performed by: FAMILY MEDICINE

## 2017-06-20 PROCEDURE — 25000128 H RX IP 250 OP 636: Performed by: EMERGENCY MEDICINE

## 2017-06-20 PROCEDURE — 70553 MRI BRAIN STEM W/O & W/DYE: CPT

## 2017-06-20 PROCEDURE — 93010 ELECTROCARDIOGRAM REPORT: CPT | Performed by: FAMILY MEDICINE

## 2017-06-20 PROCEDURE — 70544 MR ANGIOGRAPHY HEAD W/O DYE: CPT

## 2017-06-20 RX ORDER — GADOBUTROL 604.72 MG/ML
12 INJECTION INTRAVENOUS ONCE
Status: COMPLETED | OUTPATIENT
Start: 2017-06-20 | End: 2017-06-20

## 2017-06-20 RX ADMIN — GADOBUTROL 12 ML: 604.72 INJECTION INTRAVENOUS at 14:14

## 2017-06-20 NOTE — DISCHARGE INSTRUCTIONS
CT without contrast was normal  MRI brain normal  MR angiogram brain normal  MR angiogram neck vessels normal      Lyme screen/test results pending.-May contact your primary clinic provider for results and 1-2 days.

## 2017-06-20 NOTE — TELEPHONE ENCOUNTER
Reason for Call:  Other facial numbness    Detailed comments: Patient states she has facial numbness.  This just started today. She is on blood thinners.  She did experience chest numbness this morning but that has now resolved.    Phone Number Patient can be reached at: Home number on file [phone] (home)    Best Time: asap    Can we leave a detailed message on this number? YES    Call taken on 6/20/2017 at 11:06 AM by Denia Colin

## 2017-06-20 NOTE — ED AVS SNAPSHOT
[facility] Emergency Department    [address] [address]    Phone:  [phone]    Fax:  [phone]                                       Summer Smith   MRN: [id number]    Department:  [facility] Emergency Department   Date of Visit:  6/20/2017           After Visit Summary Signature Page     I have received my discharge instructions, and my questions have been answered. I have discussed any challenges I see with this plan with the nurse or doctor.    ..........................................................................................................................................  Patient/Patient Representative Signature      ..........................................................................................................................................  Patient Representative Print Name and Relationship to Patient    ..................................................               ................................................  Date                                            Time    ..........................................................................................................................................  Reviewed by Signature/Title    ...................................................              ..............................................  Date                                                            Time

## 2017-06-20 NOTE — ED PROVIDER NOTES
The patient was signed over from Guy Tavarez M.D.  Presenting with facial paresthesias  On long-term anticoagulation  Head CT showed no bleed  MRI, MRA brain, MRA neck vessels were normal    Patient did travel for camping/hiking [place] in early May.  2 weeks post trip had febrile illness which could possibly have represented a primary Lyme infection  Did not have any recognition for EM rash but did remove numerous ticks    Lyme titer pending.  With only complaint being paresthesias and not having Bell's palsy did not advise empirically starting antibiotic therapy.    For additional patient referred to Александр Andersen,   06/20/17 2217

## 2017-06-20 NOTE — ED PROVIDER NOTES
History     Chief Complaint   Patient presents with     Numbness     Pt started having numbness in L side of face and then tongue.  Symptoms started at 1100.  Pt denies any weakness, numbness or tingling in arms or legs.  Pt noticed chest numbness while she was showering this morning, went away right away.     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  History is obtained from the patient states that approximately 10-10:30 AM this morning while showering she noticed numbness over her anterior chest bilaterally that lasted about 10 minutes and resolved without her doing anything about it.  Subsequently at approximately 11:00 this morning or about 1.5 hours prior to presentation she noticed left sided facial numbness which progressed to involve her left side tongue.  There was no associated headache, visual abnormality, nausea, vomiting.  At no time has she experienced chest pain shortness of air palpitations lightheadedness the patient states she has never had symptoms like this before.  She informed me that she was on Pradaxa for DVT and recurrent pulmonary emboli.    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     Mild intermittent reactive airway disease without complication 09/17/2015     Varicose veins of legs 09/17/2015     Postoperative hemorrhage involving genitourinary system following genitourinary procedure 12/27/2016     Morbid obesity due to excess calories (H) 12/27/2016     History of hysterectomy 12/27/2016     Pericardial cyst 12/27/2016     Left leg swelling 01/03/2017     SOB (shortness of  breath) 01/03/2017     History of pulmonary embolism 01/03/2017     Resolved Ambulatory Problems     Diagnosis Date Noted     No Resolved Ambulatory Problems     Past Medical History:   Diagnosis Date     DVT (deep vein thrombosis) in pregnancy (H) 2007     Pulmonary embolism (H) 2007     Past Surgical History:   Procedure Laterality Date     AS LIGATN SHORT SAPHKAMERON      had great saphenous vein removal     DAVINCI HYSTERECTOMY TOTAL      tubes removed, ovaries in place     STENT      venous in leg     Social History     Social History     Marital status:      Spouse name: N/A     Number of children: N/A     Years of education: N/A     Occupational History     Not on file.     Social History Main Topics     Smoking status: Never Smoker     Smokeless tobacco: Never Used     Alcohol use Yes     Drug use: No     Sexual activity: Yes     Partners: Male     Birth control/ protection: Male Surgical     Other Topics Concern     Parent/Sibling W/ Cabg, Mi Or Angioplasty Before 65f 55m? No     Social History Narrative     Family History   Problem Relation Age of Onset     DIABETES Father      Colon Cancer Maternal Grandfather      Liver Cancer Maternal Grandfather      CANCER Paternal Grandfather      Thyroid Disease Mother      Thyroid Disease Sister      Thyroid Disease Sister      Endocrine Disease Daughter      Lucille, has appointment end of July 2016     No current facility-administered medications for this encounter.      Current Outpatient Prescriptions   Medication     dabigatran ANTICOAGULANT (PRADAXA) 150 MG CAPS capsule     Cholecalciferol (VITAMIN D3) 3000 UNITS TABS     fluticasone (FLONASE) 50 MCG/ACT nasal spray     order for DME     ferrous sulfate (IRON) 325 (65 FE) MG tablet     albuterol (PROAIR HFA, PROVENTIL HFA, VENTOLIN HFA) 108 (90 BASE) MCG/ACT inhaler        Allergies   Allergen Reactions     Sulfa Drugs Hives     Vitamin K Anaphylaxis     Vit K IV only     Augmented Betamethasone Diprop  [Betamethasone]      Patient allergic to Augmentin ABX, not this med.  Please remove this entry     Penicillin G Rash     Amoxicillin-Pot Clavulanate Rash       I have reviewed the Medications, Allergies, Past Medical and Surgical History, and Social History in the Epic system.         Review of Systems   All other systems reviewed and are negative.      Physical Exam   BP: (!) 152/104  Heart Rate: 71  Temp: 98.1  F (36.7  C)  Resp: 16  SpO2: 99 %  Physical Exam   Constitutional: She is oriented to person, place, and time. She appears well-developed and well-nourished.   HENT:   Head: Normocephalic and atraumatic.   Right Ear: External ear normal.   Left Ear: External ear normal.   Nose: Nose normal.   Mouth/Throat: Oropharynx is clear and moist.   Eyes: Conjunctivae and EOM are normal. Pupils are equal, round, and reactive to light.   Neck: Normal range of motion. Neck supple.   Cardiovascular: Normal rate, regular rhythm, normal heart sounds and intact distal pulses.    Pulmonary/Chest: Effort normal and breath sounds normal.   Abdominal: Soft. Bowel sounds are normal.   Musculoskeletal: Normal range of motion.   Neurological: She is alert and oriented to person, place, and time. She has normal strength. No cranial nerve deficit or sensory deficit. She displays a negative Romberg sign. GCS eye subscore is 4. GCS verbal subscore is 5. GCS motor subscore is 6.   Skin: Skin is warm and dry.   Psychiatric: She has a normal mood and affect. Her behavior is normal.   Nursing note and vitals reviewed.      ED Course     ED Course     Procedures             EKG Interpretation:      Interpreted by Guy Tavarez  Time reviewed: 01:05  Symptoms at time of EKG: L facial numbness and tongue numbness   Rhythm: normal sinus   Rate: 72  Axis: Normal  Ectopy: none  Conduction: normal  ST Segments/ T Waves: No ST-T wave changes and No acute ischemic changes  Q Waves: none  Comparison to prior: No old EKG  available    Clinical Impression: normal EKG      Results for orders placed or performed during the hospital encounter of 06/20/17   CT Head w/o Contrast    Narrative    CT HEAD W/O CONTRAST  6/20/2017 1:20 PM    HISTORY: Left facial paresthesias and left tongue paresthesias    TECHNIQUE: Scans were obtained through the head without IV contrast.   Radiation dose for this scan was reduced using automated exposure  control, adjustment of the mA and/or kV according to patient size, or  iterative reconstruction technique.    COMPARISON: None.    FINDINGS: No hemorrhage, mass lesion, or focal area of acute  infarction identified. Paranasal sinuses are normal. No bony  abnormality.      Impression    IMPRESSION: Negative CT scan of the head.    JULEE CONTRERAS MD     1:27 PM  CT is negative.  I have placed orders for MRI/MRA of the head and neck and discussed with the patient.  At the time of shift change the care the patient is assumed by Dr. Konstantin Garcia.            Critical Care time:  none               Labs Ordered and Resulted from Time of ED Arrival Up to the Time of Departure from the ED   CBC WITH PLATELETS DIFFERENTIAL - Abnormal; Notable for the following:        Result Value    RBC Count 5.23 (*)     MCH 25.8 (*)     All other components within normal limits   BASIC METABOLIC PANEL   INR   PARTIAL THROMBOPLASTIN TIME   TROPONIN I   GLUCOSE BY METER   GLUCOSE MONITOR NURSING POCT       Assessments & Plan (with Medical Decision Making)     I have reviewed the nursing notes.    I have reviewed the findings, diagnosis, plan and need for follow up with the patient.       Discharge Medication List as of 6/20/2017  4:19 PM          Final diagnoses:   Facial paresthesia       6/20/2017   [facility] EMERGENCY DEPARTMENT     Julee Tavarez MD  06/21/17 3444

## 2017-06-20 NOTE — TELEPHONE ENCOUNTER
S-(situation): Left sided facial numbness that began this morning.     B-(background): Pt has a history of recurrent DVT's and PE's and is currently on blood thinners.    A-(assessment): Pt reports some chest numbness on and off today. Denies any headache, facial droop, or any weakness. No slurred speech noted or reported.     R-(recommendations): I have recommended seeking medical attention in the ED/UC.  Pt is able to verbalize understanding of this information.     Vanessa Weaver RN

## 2017-06-20 NOTE — ED NOTES
Pt reports taking shower this am around 1050 pt felt numbness in left side of face and approximately 10 minutes later numbness moved into top roof of mouth numbness.  Pt denies numbness in upper or lower extremities or chest pain.    Pt on blood thinner for hx of dvt and pe's.    Pt continues to have numbness in left side of face and mouth at this time with no other complaints.

## 2017-06-20 NOTE — ED AVS SNAPSHOT
[facility]    [address] [address]    Phone:  [phone]    Fax:  [phone]                                       Summer Smith   MRN: [id number]    Department:  [facility]   Date of Visit:  6/20/2017           Patient Information     Date Of Birth          1980        Your diagnoses for this visit were:     Facial paresthesia        You were seen by Guy Tavarez MD and Александр Garcia DO.        Discharge Instructions       CT without contrast was normal  MRI brain normal  MR angiogram brain normal  MR angiogram neck vessels normal      Lyme screen/test results pending.-May contact your primary clinic provider for results and 1-2 days.      24 Hour Appointment Hotline       To make an appointment at any [facility], call [phone] ([phone]). If you don't have a family doctor or clinic, we will help you find one. [place] clinics are conveniently located to serve the needs of you and your family.             Review of your medicines      Our records show that you are taking the medicines listed below. If these are incorrect, please call your family doctor or clinic.        Dose / Directions Last dose taken    albuterol 108 (90 BASE) MCG/ACT Inhaler   Commonly known as:  PROAIR HFA/PROVENTIL HFA/VENTOLIN HFA   Dose:  2 puff        Inhale 2 puffs into the lungs every 6 hours as needed   Refills:  0        dabigatran ANTICOAGULANT 150 MG capsule   Commonly known as:  PRADAXA   Dose:  150 mg   Quantity:  60 capsule        Take 1 capsule (150 mg) by mouth 2 times daily Store in original 's bottle or blister pack; use within 120 days of opening. Do not crush or open capsule.   Refills:  0        ferrous sulfate 325 (65 FE) MG tablet   Commonly known as:  IRON   Dose:  325 mg        Take 325 mg by mouth daily (with breakfast)   Refills:  0        fluticasone 50 MCG/ACT spray   Commonly known  as:  FLONASE   Dose:  1-2 spray   Quantity:  16 g        Spray 1-2 sprays into both nostrils daily   Refills:  0        order for DME   Quantity:  1 each        Equipment being ordered: thigh high compression socks 25-35 mm Hg.  Please measure   Refills:  11        Vitamin D3 3000 UNITS Tabs   Dose:  1 tablet        Take 1 tablet by mouth daily   Refills:  0                Procedures and tests performed during your visit     Basic metabolic panel    CBC with platelets differential    CT Head w/o Contrast    EKG 12-lead, tracing only    Glucose by meter    Glucose monitor nursing POCT    INR    Lyme Disease Stephanie with reflex to WB Serum    MR Brain w/o & w Contrast    MR Head w/o Contrast Angiogram    Partial thromboplastin time    Troponin I      Orders Needing Specimen Collection     None      Pending Results     Date and Time Order Name Status Description    6/20/2017 1330 Lyme Disease Stephanie with reflex to WB Serum In process             Pending Culture Results     No orders found from 6/18/2017 to 6/21/2017.            Pending Results Instructions     If you had any lab results that were not finalized at the time of your Discharge, you can call the ED Lab Result RN at [phone]. You will be contacted by this team for any positive Lab results or changes in treatment. The nurses are available 7 days a week from 10A to 6:30P.  You can leave a message 24 hours per day and they will return your call.        Test Results From Your Hospital Stay        6/20/2017  1:12 PM      Component Results     Component Value Ref Range & Units Status    WBC 7.8 4.0 - 11.0 10e9/L Final    RBC Count 5.23 (H) 3.8 - 5.2 10e12/L Final    Hemoglobin 13.5 11.7 - 15.7 g/dL Final    Hematocrit 42.6 35.0 - 47.0 % Final    MCV 82 78 - 100 fl Final    MCH 25.8 (L) 26.5 - 33.0 pg Final    MCHC 31.7 31.5 - 36.5 g/dL Final    RDW 13.4 10.0 - 15.0 % Final    Platelet Count 326 150 - 450 10e9/L Final    Diff Method Automated Method  Final    %  Neutrophils 58.9 % Final    % Lymphocytes 27.7 % Final    % Monocytes 8.6 % Final    % Eosinophils 3.1 % Final    % Basophils 1.2 % Final    % Immature Granulocytes 0.5 % Final    Absolute Neutrophil 4.6 1.6 - 8.3 10e9/L Final    Absolute Lymphocytes 2.2 0.8 - 5.3 10e9/L Final    Absolute Monocytes 0.7 0.0 - 1.3 10e9/L Final    Absolute Eosinophils 0.2 0.0 - 0.7 10e9/L Final    Absolute Basophils 0.1 0.0 - 0.2 10e9/L Final    Abs Immature Granulocytes 0.0 0 - 0.4 10e9/L Final         6/20/2017  1:22 PM      Component Results     Component Value Ref Range & Units Status    Sodium 135 133 - 144 mmol/L Final    Potassium 4.0 3.4 - 5.3 mmol/L Final    Chloride 104 94 - 109 mmol/L Final    Carbon Dioxide 25 20 - 32 mmol/L Final    Anion Gap 6 3 - 14 mmol/L Final    Glucose 92 70 - 99 mg/dL Final    Urea Nitrogen 13 7 - 30 mg/dL Final    Creatinine 0.63 0.52 - 1.04 mg/dL Final    GFR Estimate >90  Non  GFR Calc   >60 mL/min/1.7m2 Final    GFR Estimate If Black >90   GFR Calc   >60 mL/min/1.7m2 Final    Calcium 8.8 8.5 - 10.1 mg/dL Final         6/20/2017  1:19 PM      Component Results     Component Value Ref Range & Units Status    INR 0.97 0.86 - 1.14 Final         6/20/2017  1:19 PM      Component Results     Component Value Ref Range & Units Status    PTT 35 22 - 37 sec Final         6/20/2017  1:22 PM      Component Results     Component Value Ref Range & Units Status    Troponin I ES  0.000 - 0.045 ug/L Final    <0.015  The 99th percentile for upper reference range is 0.045 ug/L.  Troponin values in   the range of 0.045 - 0.120 ug/L may be associated with risks of adverse   clinical events.           6/20/2017  1:24 PM      Narrative     CT HEAD W/O CONTRAST  6/20/2017 1:20 PM    HISTORY: Left facial paresthesias and left tongue paresthesias    TECHNIQUE: Scans were obtained through the head without IV contrast.   Radiation dose for this scan was reduced using automated  exposure  control, adjustment of the mA and/or kV according to patient size, or  iterative reconstruction technique.    COMPARISON: None.    FINDINGS: No hemorrhage, mass lesion, or focal area of acute  infarction identified. Paranasal sinuses are normal. No bony  abnormality.        Impression     IMPRESSION: Negative CT scan of the head.    JULEE CONTRERAS MD         6/20/2017  1:01 PM      Component Results     Component Value Ref Range & Units Status    Glucose 97 70 - 99 mg/dL Final         6/20/2017  3:13 PM      Narrative     MR BRAIN WITHOUT AND WITH CONTRAST  6/20/2017 2:53 PM    HISTORY:  Left-sided facial numbness, left-sided tongue numbness.    COMPARISON: [facility] today. Head CT 6/20/2017.    TECHNIQUE: Sagittal T1, axial diffusion scan, axial T2, axial coronal  FLAIR, coronal T2, axial T1, axial and coronal post gadolinium  enhanced T1-weighted images. 12 mL gadolinium.    FINDINGS: There is no intracranial hemorrhage, mass, or recent  infarct. No pathologic enhancement with gadolinium. Ventricles are  normal in size and position. No white matter signal abnormality.  Sinuses are clear.        Impression     IMPRESSION: Normal MR brain.    JULEE CONTRERAS MD         6/20/2017  2:59 PM      Narrative     MRA ANGIOGRAM HEAD WITHOUT CONTRAST June 20, 2017 2:31 PM    HISTORY: Left-sided facial numbness, left-sided tongue numbness.    COMPARISON: None.    TECHNIQUE: Routine [facility] MRA.    FINDINGS: Normal [facility] MRA. Large caliber vessels are  patent. No evidence for aneurysm.        Impression     IMPRESSION: Normal [facility]RA.    JULEE CONTRERAS MD         6/20/2017  1:40 PM                Thank you for choosing [facility]       Thank you for choosing [facility] for your care. Our goal is always to provide you with excellent care. Hearing back from our patients is one way we can continue to improve our services. Please take a few minutes to complete the written survey  that you may receive in the mail after you visit with us. Thank you!        [employer] Information     [employer]CT gives you secure access to your electronic health record. If you see a primary care provider, you can also send messages to your care team and make appointments. If you have questions, please call your primary care clinic.  If you do not have a primary care provider, please call [phone] and they will assist you.        Care EveryWhere ID     This is your Care EveryWhere ID. This could be used by other organizations to access your [facility] medical records  [id number]        After Visit Summary       This is your record. Keep this with you and show to your community pharmacist(s) and doctor(s) at your next visit.

## 2017-06-21 LAB — B BURGDOR IGG+IGM SER QL: 0.1 (ref 0–0.89)

## 2017-09-20 ENCOUNTER — OFFICE VISIT (OUTPATIENT)
Dept: FAMILY MEDICINE | Facility: CLINIC | Age: 37
End: 2017-09-20
Payer: COMMERCIAL

## 2017-09-20 VITALS
HEART RATE: 69 BPM | BODY MASS INDEX: 46.85 KG/M2 | OXYGEN SATURATION: 96 % | DIASTOLIC BLOOD PRESSURE: 69 MMHG | TEMPERATURE: 98.9 F | HEIGHT: 65 IN | WEIGHT: 281.2 LBS | SYSTOLIC BLOOD PRESSURE: 136 MMHG

## 2017-09-20 DIAGNOSIS — E66.01 OBESITY, CLASS III, BMI 40-49.9 (MORBID OBESITY) (H): Primary | ICD-10-CM

## 2017-09-20 DIAGNOSIS — E55.9 HYPOVITAMINOSIS D: ICD-10-CM

## 2017-09-20 DIAGNOSIS — Z23 NEED FOR DIPHTHERIA-TETANUS-PERTUSSIS (TDAP) VACCINE: ICD-10-CM

## 2017-09-20 LAB
CHOLEST SERPL-MCNC: 226 MG/DL
DEPRECATED CALCIDIOL+CALCIFEROL SERPL-MC: 32 UG/L (ref 20–75)
HDLC SERPL-MCNC: 42 MG/DL
LDLC SERPL CALC-MCNC: 163 MG/DL
NONHDLC SERPL-MCNC: 184 MG/DL
TRIGL SERPL-MCNC: 106 MG/DL

## 2017-09-20 PROCEDURE — 90715 TDAP VACCINE 7 YRS/> IM: CPT | Performed by: INTERNAL MEDICINE

## 2017-09-20 PROCEDURE — 99214 OFFICE O/P EST MOD 30 MIN: CPT | Mod: 25 | Performed by: INTERNAL MEDICINE

## 2017-09-20 PROCEDURE — 82306 VITAMIN D 25 HYDROXY: CPT | Performed by: INTERNAL MEDICINE

## 2017-09-20 PROCEDURE — 80061 LIPID PANEL: CPT | Performed by: INTERNAL MEDICINE

## 2017-09-20 PROCEDURE — 36415 COLL VENOUS BLD VENIPUNCTURE: CPT | Performed by: INTERNAL MEDICINE

## 2017-09-20 PROCEDURE — 90471 IMMUNIZATION ADMIN: CPT | Performed by: INTERNAL MEDICINE

## 2017-09-20 RX ORDER — PHENTERMINE HYDROCHLORIDE 15 MG/1
15 CAPSULE ORAL EVERY MORNING
Qty: 30 CAPSULE | Refills: 0 | Status: SHIPPED | OUTPATIENT
Start: 2017-09-20 | End: 2017-10-16

## 2017-09-20 NOTE — MR AVS SNAPSHOT
After Visit Summary   9/20/2017    Summer Smith    MRN: [id number]           Patient Information     Date Of Birth          1980        Visit Information        Provider Department      9/20/2017 9:00 AM Kalyan Hogan MD [facility]        Today's Diagnoses     Need for diphtheria-tetanus-pertussis (Tdap) vaccine    -  1    Obesity, Class III, BMI 40-49.9 (morbid obesity) (H)          Care Instructions    Call or MyChart in one month to let me know how things are going, and if you are tolerating the phentermine okay and would like to try the higher dose, I can write a prescription at that time.        Weight Management: Getting Started  Healthy bodies come in all shapes and sizes. Not all bodies are made to be thin. For some people, a healthy weight is higher than the average weight listed on weight charts. Your healthcare provider can help you decide on a healthy weight for you.    Reasons to lose weight  Losing weight can help with some health problems, such as high blood pressure, heart disease, diabetes, sleep apnea, and arthritis. You may also feel more energy.  Set your long-term goal- usually just 1-2 pounds per week  Your goal doesn't even have to be a specific weight. You may decide on a fitness goal (such as being able to walk 10 miles a week), or a health goal (such as lowering your blood pressure). Choose a goal that is measurable and reasonable, so you know when you've reached it. A goal of reaching a BMI of less than 25 is not always reasonable (or possible).   Make an action plan  Habits don t change overnight. Setting your goals too high can leave you feeling discouraged if you can t reach them. Be realistic. Choose one or two small changes you can make now. Set an action plan for how you are going to make these changes. When you can stick to this plan, keep making a few more small changes. Taking small steps will help you stay on the path to success.  Track your  progress  Write down your goals. Then, keep a daily record of your progress. Write down what you eat and how active you are. This record lets you look back on how much you ve done. It may also help when you re feeling frustrated. Reward yourself for success. Even if you don t reach every goal, give yourself credit for what you do get done.  Get support  Encouragement from others can help make losing weight easier. Ask your family members and friends for support. They may even want to join you. Also look to your healthcare provider, registered dietitian, and  for help. Your local hospital can give you more information about nutrition, exercise, and weight loss.  Date Last Reviewed: 1/31/2016 2000-2017 [employer]. [address] [place] [address], [place] [address]. All rights reserved. This information is not intended as a substitute for professional medical care. Always follow your healthcare professional's instructions.        Weight Management: Healthy Eating  Food is your body s fuel. You can t live without it. The key is to give your body enough nutrients and energy without eating too much. Reading food labels can help you make healthy choices. Also, learn new eating habits to manage your weight.     All the values on the label are based on one serving. The serving size is the average portion. Remember to multiply the values on the label by the number of servings you eat.   Eat less fat  A gram of fat has almost 2.5 times the calories of a gram of protein or carbohydrates. Try to balance your food choices so that only 20% to 35% of your calories comes from total fat. This means an average of 2  to 3  grams of fat for each 100 calories you eat.  Eat more fiber  High-fiber foods are digested more slowly than low-fiber foods, so you feel full longer. Try to get at least 25 grams of fiber each day for a 2000 calorie diet. Foods high in fiber include:    Vegetables and  fruits    Whole-grain or bran breads, pastas, and cereals    Legumes (beans) and peas  As you begin to eat more fiber, be sure to drink plenty of water to keep your digestive system working smoothly.  Tips  Do's and don'ts include:     Don t skip meals. This often leads to overeating later on. It s best to spread your eating throughout the day.    Eat a variety of foods, not just a few favorites.    If you find yourself eating when you re not hungry, ask yourself why. Many of us eat when we re bored, stressed, or just to be polite. Listen to your body. If you re not hungry, get busy doing something else instead of eating.    Eat slower, shooting for 20 to 30 minutes for each meal. It takes 20 minutes for your stomach to tell your brain that it s full. Slow eaters tend to eat less and are still satisfied, while fast eaters may tend to be overeaters.     Pay attention to what you eat. Don t read or watch TV during your meal.  Date Last Reviewed: 1/31/2016 2000-2017 [employer]. [address] [place] [address]. All rights reserved. This information is not intended as a substitute for professional medical care. Always follow your healthcare professional's instructions.        Weight Management: Exercise and Activity- at least 150 minutes per week (moderate intensity)    Studies show that people who exercise are the most likely to lose weight and keep it off. Exercise burns calories. It helps build muscle to make your body stronger. Make exercise an important part of your weight-management plan.  Make activity part of your day  You may not think you have the time to exercise. But you can work activity into your daily life--you just need to be committed. Take 10 minutes out of your lunch hour to take a walk. Walk to the newsstand to get your paper instead of having it delivered. Make it a habit to take the stairs instead of the elevator. Park in a far away parking spot instead of the closest.  You ll be surprised at how fast these little changes can make a difference.  Some people really cannot walk very far, and tire out quickly with exercise. Instead of becoming discouraged, resolve to do what you can do, and work to make that a regular frequent habit.   The benefits of exercise  Exercise offers many benefits including:     Exercise increases your metabolism (the speed at which your body burns calories).    Regular exercise can increase the amount of muscle in your body. Muscle burns calories faster than fat. The more muscle you have, the more calories you burn.    Exercise gives you energy and curbs your appetite.    Exercise decreases stress and helps you sleep better.  Make exercise fun  Exercise can be fun. Choose an activity you enjoy. You may even get a friend to do it with you:    Take a resistance-training or aerobics class    Join a team sport    Take a dance class    Walk the dog    Ride a bike  If you have health problems, be sure to ask your healthcare provider before you start an exercise program. Have a  help you develop a plan that s safe for you.   Date Last Reviewed: 2/4/2016 2000-2017 [employer]. [address] [place] [address]. All rights reserved. This information is not intended as a substitute for professional medical care. Always follow your healthcare professional's instructions.        Weight Management: Fact and Fiction    Knowing the truth about losing weight can help you separate what works from what doesn t. Don t be taken in by expensive weight-loss fads like pills, herbs, and special foods that promise unbelievable results. There s no magic way to lose weight. If you have questions about weight loss, ask your healthcare provider.  Fiction:  The faster I lose weight, the better.   Fact: Rapid weight loss is usually due to loss of water or muscle mass. What you re trying to get rid of is extra fat. Aim to lose a 1/2 pound to  2 pounds a week. Then you re more likely to lose fat rather than water or muscle.  Fiction:  Skipping meals will help me lose weight.   Fact: When you skip meals, you don t give your body the energy it needs to work. Hunger makes you more likely to overeat later on. It s best to spread your meals throughout the day. Eat at least three meals a day.  Fiction:  I can t start exercising until I lose weight.   Fact: The sooner you start exercising the better. Exercise helps burn more calories, tone your muscles, and keep your appetite in check. People who continue to exercise after they lose weight are more likely to keep the weight off.  Fiction:  The fewer calories I eat, the better.   Fact: This seems like it should be true, but it s not. When you eat too few calories, your body acts as if it s on a desert island. It thinks food is scarce, so it slows down your metabolism (how fast you burn calories) to save energy. By eating too few calories, you make it harder to lose weight.  Fiction:  Once I lose weight, I can go back to living the way I did before.   Fact: Going back to your old eating habits and giving up exercise is a sure way to regain any weight you ve lost. The lifestyle changes that help you lose extra weight can also help keep it off. This is why you need to make realistic changes you can stick with.  Fiction:  Low-fat and fat-free mean low-calorie.   Fact: All foods, even fat-free ones, have calories. Eat too many calories and you ll gain weight. It s OK to treat yourself to a fat-free cookie or two. Just don t eat the whole box! A dietitian will help you figure this out, and will likely recommend that you eat three meals a day, with protein with each meal.   Date Last Reviewed: 2/4/2016 2000-2017 [employer]. [address] [place] [address], [place] [address]. All rights reserved. This information is not intended as a substitute for professional medical care. Always follow your healthcare  professional's instructions.                Follow-ups after your visit        Additional Services     NUTRITION REFERRAL       Your provider has referred you to: [facility]: [facility] [phone]   [email or web address]    Please be aware that coverage of these services is subject to the terms and limitations of your health insurance plan.  Call member services at your health plan with any benefit or coverage questions.      Please bring the following with you to your appointment:    (1) This referral request  (2) Any documents given to you regarding this referral  (3) Any specific questions you have about diet and/or food choices                  Who to contact     If you have questions or need follow up information about today's clinic visit or your schedule please contact [facility] directly at [phone].  Normal or non-critical lab and imaging results will be communicated to you by MyChart, letter or phone within 4 business days after the clinic has received the results. If you do not hear from us within 7 days, please contact the clinic through [employer] or phone. If you have a critical or abnormal lab result, we will notify you by phone as soon as possible.  Submit refill requests through [employer] or call your pharmacy and they will forward the refill request to us. Please allow 3 business days for your refill to be completed.          Additional Information About Your Visit        [employer] Information     [employer] gives you secure access to your electronic health record. If you see a primary care provider, you can also send messages to your care team and make appointments. If you have questions, please call your primary care clinic.  If you do not have a primary care provider, please call [phone] and they will assist you.        Care EveryWhere ID     This is your Care EveryWhere ID. This could be used by other organizations to access your [facility]  "medical records  [id number]        Your Vitals Were     Pulse Temperature Height Last Period Pulse Oximetry BMI (Body Mass Index)    69 98.9  F (37.2  C) (Tympanic) 5' 5.25\" (1.657 m) 08/01/2016 96% 46.44 kg/m2       Blood Pressure from Last 3 Encounters:   09/20/17 136/69   06/20/17 (!) 139/96   01/05/17 124/90    Weight from Last 3 Encounters:   09/20/17 281 lb 3.2 oz (127.6 kg)   01/05/17 274 lb (124.3 kg)   01/04/17 271 lb (122.9 kg)              We Performed the Following     ADMIN 1st VACCINE     NUTRITION REFERRAL     SCREENING QUESTIONS FOR ADULT IMMUNIZATIONS     TDAP VACCINE (ADACEL)          Today's Medication Changes          These changes are accurate as of: 9/20/17  9:25 AM.  If you have any questions, ask your nurse or doctor.               Start taking these medicines.        Dose/Directions    phentermine 15 MG capsule   Used for:  Obesity, Class III, BMI 40-49.9 (morbid obesity) (H)   Started by:  Kalyan Hogan MD        Dose:  15 mg   Take 1 capsule (15 mg) by mouth every morning   Quantity:  30 capsule   Refills:  0            Where to get your medicines      Some of these will need a paper prescription and others can be bought over the counter.  Ask your nurse if you have questions.     Bring a paper prescription for each of these medications     phentermine 15 MG capsule                Primary Care Provider Office Phone # Fax #    Kalyan Hogan [phone] [address] [address]        Equal Access to Services     MADELYN FARRAR AH: Hadii sahil domínguezo Soalida, waaxda luqadaha, qaybta kaalmada katie, waxay idiin hayaan adeeg kharash la'aan . So [facility] [phone].    ATENCIÓN: Si habla español, tiene a ames disposición servicios gratuitos de asistencia lingüística. Llame al [phone].    We comply with applicable federal civil rights laws and [place] laws. We do not discriminate on the basis of race, color, national origin, age, disability sex, " sexual orientation or gender identity.            Thank you!     Thank you for choosing [facility]  for your care. Our goal is always to provide you with excellent care. Hearing back from our patients is one way we can continue to improve our services. Please take a few minutes to complete the written survey that you may receive in the mail after your visit with us. Thank you!             Your Updated Medication List - Protect others around you: Learn how to safely use, store and throw away your medicines at www.disposemymeds.org.          This list is accurate as of: 9/20/17  9:25 AM.  Always use your most recent med list.                   Brand Name Dispense Instructions for use Diagnosis    albuterol 108 (90 BASE) MCG/ACT Inhaler    PROAIR HFA/PROVENTIL HFA/VENTOLIN HFA     Inhale 2 puffs into the lungs every 6 hours as needed        dabigatran ANTICOAGULANT 150 MG capsule    PRADAXA    60 capsule    Take 1 capsule (150 mg) by mouth 2 times daily Store in original 's bottle or blister pack; use within 120 days of opening. Do not crush or open capsule.        ferrous sulfate 325 (65 FE) MG tablet    IRON     Take 325 mg by mouth daily (with breakfast)        fluticasone 50 MCG/ACT spray    FLONASE    16 g    Spray 1-2 sprays into both nostrils daily    Post-nasal drainage       order for DME     1 each    Equipment being ordered: thigh high compression socks 25-35 mm Hg.  Please measure    Personal history of DVT (deep vein thrombosis)       phentermine 15 MG capsule     30 capsule    Take 1 capsule (15 mg) by mouth every morning    Obesity, Class III, BMI 40-49.9 (morbid obesity) (H)       Vitamin D3 3000 UNITS Tabs      Take 1 tablet by mouth daily        ZYRTEC ALLERGY PO

## 2017-09-20 NOTE — NURSING NOTE
"Chief Complaint   Patient presents with     Weight Problem     looking for tips     Imm/Inj     Tdap       Initial /69 (BP Location: Right arm, Patient Position: Chair, Cuff Size: Adult Large)  Pulse 69  Temp 98.9  F (37.2  C) (Tympanic)  Ht 5' 5.25\" (1.657 m)  Wt 281 lb 3.2 oz (127.6 kg)  LMP 08/01/2016  SpO2 96%  BMI 46.44 kg/m2 Estimated body mass index is 46.44 kg/(m^2) as calculated from the following:    Height as of this encounter: 5' 5.25\" (1.657 m).    Weight as of this encounter: 281 lb 3.2 oz (127.6 kg).  Medication Reconciliation: complete     "

## 2017-09-20 NOTE — PATIENT INSTRUCTIONS
Call or MyChart in one month to let me know how things are going, and if you are tolerating the phentermine okay and would like to try the higher dose, I can write a prescription at that time.        Weight Management: Getting Started  Healthy bodies come in all shapes and sizes. Not all bodies are made to be thin. For some people, a healthy weight is higher than the average weight listed on weight charts. Your healthcare provider can help you decide on a healthy weight for you.    Reasons to lose weight  Losing weight can help with some health problems, such as high blood pressure, heart disease, diabetes, sleep apnea, and arthritis. You may also feel more energy.  Set your long-term goal- usually just 1-2 pounds per week  Your goal doesn't even have to be a specific weight. You may decide on a fitness goal (such as being able to walk 10 miles a week), or a health goal (such as lowering your blood pressure). Choose a goal that is measurable and reasonable, so you know when you've reached it. A goal of reaching a BMI of less than 25 is not always reasonable (or possible).   Make an action plan  Habits don t change overnight. Setting your goals too high can leave you feeling discouraged if you can t reach them. Be realistic. Choose one or two small changes you can make now. Set an action plan for how you are going to make these changes. When you can stick to this plan, keep making a few more small changes. Taking small steps will help you stay on the path to success.  Track your progress  Write down your goals. Then, keep a daily record of your progress. Write down what you eat and how active you are. This record lets you look back on how much you ve done. It may also help when you re feeling frustrated. Reward yourself for success. Even if you don t reach every goal, give yourself credit for what you do get done.  Get support  Encouragement from others can help make losing weight easier. Ask your family members and  friends for support. They may even want to join you. Also look to your healthcare provider, registered dietitian, and  for help. Your local hospital can give you more information about nutrition, exercise, and weight loss.  Date Last Reviewed: 1/31/2016 2000-2017 [employer]. [address] [place] [address], [place] [address]. All rights reserved. This information is not intended as a substitute for professional medical care. Always follow your healthcare professional's instructions.        Weight Management: Healthy Eating  Food is your body s fuel. You can t live without it. The key is to give your body enough nutrients and energy without eating too much. Reading food labels can help you make healthy choices. Also, learn new eating habits to manage your weight.     All the values on the label are based on one serving. The serving size is the average portion. Remember to multiply the values on the label by the number of servings you eat.   Eat less fat  A gram of fat has almost 2.5 times the calories of a gram of protein or carbohydrates. Try to balance your food choices so that only 20% to 35% of your calories comes from total fat. This means an average of 2  to 3  grams of fat for each 100 calories you eat.  Eat more fiber  High-fiber foods are digested more slowly than low-fiber foods, so you feel full longer. Try to get at least 25 grams of fiber each day for a 2000 calorie diet. Foods high in fiber include:    Vegetables and fruits    Whole-grain or bran breads, pastas, and cereals    Legumes (beans) and peas  As you begin to eat more fiber, be sure to drink plenty of water to keep your digestive system working smoothly.  Tips  Do's and don'ts include:     Don t skip meals. This often leads to overeating later on. It s best to spread your eating throughout the day.    Eat a variety of foods, not just a few favorites.    If you find yourself eating when you re not hungry, ask  yourself why. Many of us eat when we re bored, stressed, or just to be polite. Listen to your body. If you re not hungry, get busy doing something else instead of eating.    Eat slower, shooting for 20 to 30 minutes for each meal. It takes 20 minutes for your stomach to tell your brain that it s full. Slow eaters tend to eat less and are still satisfied, while fast eaters may tend to be overeaters.     Pay attention to what you eat. Don t read or watch TV during your meal.  Date Last Reviewed: 1/31/2016 2000-2017 [employer]. [address] [place] [address], [place] [address]. All rights reserved. This information is not intended as a substitute for professional medical care. Always follow your healthcare professional's instructions.        Weight Management: Exercise and Activity- at least 150 minutes per week (moderate intensity)    Studies show that people who exercise are the most likely to lose weight and keep it off. Exercise burns calories. It helps build muscle to make your body stronger. Make exercise an important part of your weight-management plan.  Make activity part of your day  You may not think you have the time to exercise. But you can work activity into your daily life--you just need to be committed. Take 10 minutes out of your lunch hour to take a walk. Walk to the [employer] to get your paper instead of having it delivered. Make it a habit to take the stairs instead of the elevator. Park in a far away parking spot instead of the closest. You ll be surprised at how fast these little changes can make a difference.  Some people really cannot walk very far, and tire out quickly with exercise. Instead of becoming discouraged, resolve to do what you can do, and work to make that a regular frequent habit.   The benefits of exercise  Exercise offers many benefits including:     Exercise increases your metabolism (the speed at which your body burns calories).    Regular exercise can increase the  amount of muscle in your body. Muscle burns calories faster than fat. The more muscle you have, the more calories you burn.    Exercise gives you energy and curbs your appetite.    Exercise decreases stress and helps you sleep better.  Make exercise fun  Exercise can be fun. Choose an activity you enjoy. You may even get a friend to do it with you:    Take a resistance-training or aerobics class    Join a team sport    Take a dance class    Walk the dog    Ride a bike  If you have health problems, be sure to ask your healthcare provider before you start an exercise program. Have a  help you develop a plan that s safe for you.   Date Last Reviewed: 2/4/2016 2000-2017 [employer]. [address] [place] [address], [place] [address]. All rights reserved. This information is not intended as a substitute for professional medical care. Always follow your healthcare professional's instructions.        Weight Management: Fact and Fiction    Knowing the truth about losing weight can help you separate what works from what doesn t. Don t be taken in by expensive weight-loss fads like pills, herbs, and special foods that promise unbelievable results. There s no magic way to lose weight. If you have questions about weight loss, ask your healthcare provider.  Fiction:  The faster I lose weight, the better.   Fact: Rapid weight loss is usually due to loss of water or muscle mass. What you re trying to get rid of is extra fat. Aim to lose a 1/2 pound to 2 pounds a week. Then you re more likely to lose fat rather than water or muscle.  Fiction:  Skipping meals will help me lose weight.   Fact: When you skip meals, you don t give your body the energy it needs to work. Hunger makes you more likely to overeat later on. It s best to spread your meals throughout the day. Eat at least three meals a day.  Fiction:  I can t start exercising until I lose weight.   Fact: The sooner you start exercising the better.  Exercise helps burn more calories, tone your muscles, and keep your appetite in check. People who continue to exercise after they lose weight are more likely to keep the weight off.  Fiction:  The fewer calories I eat, the better.   Fact: This seems like it should be true, but it s not. When you eat too few calories, your body acts as if it s on a desert island. It thinks food is scarce, so it slows down your metabolism (how fast you burn calories) to save energy. By eating too few calories, you make it harder to lose weight.  Fiction:  Once I lose weight, I can go back to living the way I did before.   Fact: Going back to your old eating habits and giving up exercise is a sure way to regain any weight you ve lost. The lifestyle changes that help you lose extra weight can also help keep it off. This is why you need to make realistic changes you can stick with.  Fiction:  Low-fat and fat-free mean low-calorie.   Fact: All foods, even fat-free ones, have calories. Eat too many calories and you ll gain weight. It s OK to treat yourself to a fat-free cookie or two. Just don t eat the whole box! A dietitian will help you figure this out, and will likely recommend that you eat three meals a day, with protein with each meal.   Date Last Reviewed: 2/4/2016 2000-2017 [employer]. [address] [place] [address], [place] [address]. All rights reserved. This information is not intended as a substitute for professional medical care. Always follow your healthcare professional's instructions.

## 2017-09-20 NOTE — PROGRESS NOTES
SUBJECTIVE:   Summer Smith is a 37 year old female who presents to clinic today for the following health issues:  Chief Complaint   Patient presents with     Weight Problem     looking for tips     Imm/Inj     Tdap       Summer was on a low carb diet last year and lost 20 pounds, however, she gained 10 pounds back when she was placed on progesterone for irregularly heavy menstrual bleeding, and then gains 10+ pounds after having a hysterectomy since she wasn't very active.  She has resumed the low carb diet, but isn't having much weight loss (down about 5 pounds since resuming).  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  Her weight loss goal is to get to 180 pounds.  She does use a fitness tracker and keeps a lot of records.  She is considering bariatric surgery in a few years, but wants to try other measures first.       Problem list and histories reviewed & adjusted, as indicated.  Additional history: as documented    Current Outpatient Prescriptions   Medication Sig Dispense Refill     Cetirizine HCl (ZYRTEC ALLERGY PO)        phentermine 15 MG capsule Take 1 capsule (15 mg) by mouth every morning 30 capsule 0     dabigatran ANTICOAGULANT (PRADAXA) 150 MG CAPS capsule Take 1 capsule (150 mg) by mouth 2 times daily Store in original 's bottle or blister pack; use within 120 days of opening. Do not crush or open capsule. 60 capsule      Cholecalciferol (VITAMIN D3) 3000 UNITS TABS Take 1 tablet by mouth daily        fluticasone (FLONASE) 50 MCG/ACT nasal spray Spray 1-2 sprays into both nostrils daily 16 g 0     albuterol  "(PROAIR HFA, PROVENTIL HFA, VENTOLIN HFA) 108 (90 BASE) MCG/ACT inhaler Inhale 2 puffs into the lungs every 6 hours as needed        ferrous sulfate (IRON) 325 (65 FE) MG tablet Take 325 mg by mouth daily (with breakfast)        order for DME Equipment being ordered: thigh high compression socks 25-35 mm Hg.  Please measure 1 each 11     Allergies   Allergen Reactions     Sulfa Drugs Hives     Vitamin K Anaphylaxis     Vit K IV only     Augmented Betamethasone Diprop [Betamethasone]      Patient allergic to Augmentin ABX, not this med.  Please remove this entry     Penicillin G Rash     Amoxicillin-Pot Clavulanate Rash       Reviewed and updated as needed this visit by clinical staff  Tobacco  Allergies  Med Hx  Surg Hx  Fam Hx  Soc Hx      Reviewed and updated as needed this visit by Provider           OBJECTIVE:   /69 (BP Location: Right arm, Patient Position: Chair, Cuff Size: Adult Large)  Pulse 69  Temp 98.9  F (37.2  C) (Tympanic)  Ht 5' 5.25\" (1.657 m)  Wt 281 lb 3.2 oz (127.6 kg)  LMP 08/01/2016  SpO2 96%  BMI 46.44 kg/m2  Body mass index is 46.44 kg/(m^2).  GENERAL: healthy, alert and no distress, obese      ASSESSMENT/PLAN:       1. Obesity, Class III, BMI 40-49.9 (morbid obesity) (H)    We discussed general nutrition and exercise guidelines.  Okay to continue her current low carb diet.  Recommended meeting with RD for more in depth analysis of her diet and changes she could make.  Suggested setting up a (non-food) reward system for meeting exercise goals.  Unfortunately, chronic leg pain limits her exercise and she doesn't have time to go to the gym to do low impact exercise.  She has not had cholesterol checked before, so we will do this.  TSH and glucose were normal earlier this year.  Since she has not had much results with diet changes so far, we will add in low dose of phentermine.  Asked her to call in a month, and if tolerating this well but not having sufficient response, can " increase to higher dose.  Advised her we would do phentermine alone only for a few months since it is only approved for short term use.  Could transition to phentermine-topiramate for long term use if desired/needed.      - NUTRITION REFERRAL  - phentermine 15 MG capsule; Take 1 capsule (15 mg) by mouth every morning  Dispense: 30 capsule; Refill: 0  - Lipid Profile    2. Hypovitaminosis D    She reports a history of low Vit D and would like her level checked.  She has been taking a 2000 unit supplement and getting a fair bit of sun, so hopefully level is wnl.      - Vitamin D Deficiency    3. Need for diphtheria-tetanus-pertussis (Tdap) vaccine    - TDAP VACCINE (ADACEL)  - ADMIN 1st VACCINE  - SCREENING QUESTIONS FOR ADULT IMMUNIZATIONS    More than 50% of this 25 minute face-to-face appointment was spent on counseling and coordination of care of obesity treatment.      Kalyan Hogan MD  [facility]

## 2017-09-21 ASSESSMENT — ASTHMA QUESTIONNAIRES: ACT_TOTALSCORE: 24

## 2017-10-10 ENCOUNTER — HOSPITAL ENCOUNTER (OUTPATIENT)
Dept: NUTRITION | Facility: CLINIC | Age: 37
Discharge: HOME | End: 2017-10-10
Attending: INTERNAL MEDICINE | Admitting: INTERNAL MEDICINE
Payer: COMMERCIAL

## 2017-10-10 PROCEDURE — 97802 MEDICAL NUTRITION INDIV IN: CPT | Performed by: DIETITIAN, REGISTERED

## 2017-10-11 NOTE — PROGRESS NOTES
[place] [facility]  Medical Nutrition Therapy    Visit Type: Initial Assessment    Summer Smith referred by Dr. Hogan for MNT related to Obesity    Nutrition Assessment:  Anthropometrics  Wt Readings from Last 10 Encounters:   09/20/17 127.6 kg (281 lb 3.2 oz)   01/05/17 124.3 kg (274 lb)   01/04/17 122.9 kg (271 lb)   12/27/16 124.7 kg (275 lb)   11/25/16 123.4 kg (272 lb)   11/15/16 120.2 kg (265 lb)   10/25/16 123.4 kg (272 lb)   05/09/16 120.2 kg (265 lb)   04/08/16 120.7 kg (266 lb 1.6 oz)   01/08/16 127.9 kg (282 lb)     In the past 10 years:  Highest weight: 293 lbs  Lowest weight: 250 lbs    Nutrition History  Typical Food/Fluid Intake: Very, very low carbohydrate (<60 g/day)  Meal/Snack Patterns: Three meals/day, avoiding snacks in between  Previous Diet / Nutrition Education / Counseling: None  Self-selected diet(s) followed: Low Fat, Low Carb, Weight Watchers, Slim Fast  Dieting Attempts: Several  Level of Knowledge: Basic knowledge  Beliefs and Attitudes: Unscientific beliefs / attitudes  Willingness to Learn: Acceptance    Patient presents to appointment with weight concerns. She has had weight concerns dating back to 5 - 6th grade of elementary school. Patient reports that she spent her years as a child/adolescent worrying about her weight and participating in several diets/food restrictions. She reports that her parents policed her food intake and at times this would cause her to even sneak some food. In the past, she's tried to eat lower fat foods and control portions; at times she's been able to lose weight. Within the last 2 years, she's tried low carb diet, which initially she lost 20+ lbs. However, due to surgery with complications, she gained most of it back and more. Patient is now following a very low carbohydrate diet, as noted above, and started a low dose of phentermine. She reports that her weight loss has been motivated by wanting to be a healthy role model for her children  and her insecurities in regards to becoming a provider that is overweight. She has a very stressful day-to-day life right now; she rates her average stress level a 7 - 8 on a 1 - 10 scale (10 being high). She is currently working, in school, and takes care of her children; life is very busy. She sleeps restfully at night for an average of 6 - 7 hours. She tracks her foods utilizing "[employer] and does have a FitBit.    Physical Activity  Physical Activity History: Limited at this time, but patient would like to add in some low impact activity.     Nutrition Prescription  Energy:  1974 kcals/day      Protein:  102 g/day         Fluid:  1 mL/kcal        Food Record  Breakfast: eggs or egg beaters with cheese or light bread with peanut butter or oatmeal with blueberries and a Splenda blend   Lunch: salad with meat and dressing on top and Greek yogurt   Dinner: meat and vegetable and maybe a small portion of potatoes      Due to hectic life right now, does eat out at fast food sometimes, but only eats the hamburger without bun or fries. Reports that she feels hungry most of the time and identifies hunger with a rumbling stomach, aches, and dizziness. Even when she feels full after eating, she is frequently hungry within 1 - 2 hours afterwards. Patient is drinking lots of water throughout the day.      Nutrition Diagnosis:  Problem: Food and nutrition-related knowledge deficit  Etiology: Related to no previous food- and nutrition-related education  Signs and Symptoms: Reports no previous food- and nutrition-related education and reports exposure to inaccurate food- and nutrition-related information.    Nutrition Intervention:  Meals and Snacks: General/healthful diet  -Educated patient on the importance of carbohydrates in her diet. Recommend that patient incorporate more carbohydrates because there is no evidence that supports that a low carbohydrate diet has long-term weight loss benefits. Due to patient's consistent  hunger and her overall diet-related history, low carbohydrate is not a healthy choice. Further discussed the physiological process of carbohydrate, protein, and fat metabolism in the body.     Meals and Snacks: Modify distribution, type or amount of food and nutrients within meals or at specified time  -Encouraged patient to try to include at least 3 food groups at each meal to balance things out more. It's likely that if patient includes some protein, carbohydrate, and fat at all meals that she'll be more satisfied and feel more full for a longer period of time.   -Encouraged patient to add in snacks in between meals if she is feeling hungry vs ignoring the hunger. If she continues to ignore the hunger it is likely that she'll be likely to overeat later on or have low-energy. Discussed some nutritious snack ideas that she could consider incorporating.     Nutrition Education: Survival information  -Discussed tracking foods, along with hunger and fullness vs calorie counting. Provided patient with examples of this tracking might look.    Nutrition Goals:  1. Incorporate at least 3 food groups at each meal.   2. Incorporate snacks if feeling hungry in between meals.   3. Track food and hunger/fullness.     Nutrition Follow Up / Monitoring:  Food- and nutrition-related knowledge; Meal/snack/beverage composition; Eating patterns; Tracking     Nutrition Recommendations:  Patient to follow-up with RD in 3 - 4 weeks.  Patient has RD contact information to call/email if needed.    Natalie Sagastume RDN, LD  Clinical Dietitian  [phone]    Start time 1120  End time 1220

## 2017-10-16 ENCOUNTER — MYC MEDICAL ADVICE (OUTPATIENT)
Dept: FAMILY MEDICINE | Facility: CLINIC | Age: 37
End: 2017-10-16

## 2017-10-16 DIAGNOSIS — E66.01 OBESITY, CLASS III, BMI 40-49.9 (MORBID OBESITY) (H): ICD-10-CM

## 2017-10-16 RX ORDER — PHENTERMINE HYDROCHLORIDE 37.5 MG/1
37.5 CAPSULE ORAL EVERY MORNING
Qty: 30 CAPSULE | Refills: 1 | Status: SHIPPED | OUTPATIENT
Start: 2017-10-16 | End: 2017-11-15 | Stop reason: DRUGHIGH

## 2017-10-16 NOTE — TELEPHONE ENCOUNTER
Yes, we can increase the phentermine to 37.5mg for the next two months, then recommend she follow-up in clinic at that point to discuss ongoing therapy.  Rx printed and placed in my office outbasket.  Thanks.      Kalyan Hogan MD

## 2017-10-16 NOTE — TELEPHONE ENCOUNTER
Routing refill request to provider for review/approval because:  Drug not on the [facility] refill protocol   Please see her mychart as well.    OV notes 9-20-17:  1. Obesity, Class III, BMI 40-49.9 (morbid obesity) (H)     We discussed general nutrition and exercise guidelines.  Okay to continue her current low carb diet.  Recommended meeting with RD for more in depth analysis of her diet and changes she could make.  Suggested setting up a (non-food) reward system for meeting exercise goals.  Unfortunately, chronic leg pain limits her exercise and she doesn't have time to go to the gym to do low impact exercise.  She has not had cholesterol checked before, so we will do this.  TSH and glucose were normal earlier this year.  Since she has not had much results with diet changes so far, we will add in low dose of phentermine.  Asked her to call in a month, and if tolerating this well but not having sufficient response, can increase to higher dose.  Advised her we would do phentermine alone only for a few months since it is only approved for short term use.  Could transition to phentermine-topiramate for long term use if desired/needed.        Lisa SCHULZ RN

## 2017-10-16 NOTE — TELEPHONE ENCOUNTER
Rx was faxed to [facility] in [facility] at [phone]. Also called patient to let her know it was faxed.  Destiny Erwin  [facility]  Flex

## 2017-10-17 ENCOUNTER — TELEPHONE (OUTPATIENT)
Dept: FAMILY MEDICINE | Facility: CLINIC | Age: 37
End: 2017-10-17

## 2017-10-17 NOTE — TELEPHONE ENCOUNTER
PA for phentermine completed at cover my med and faxed to [employer] - will await response    Id number [id number]

## 2017-10-26 NOTE — TELEPHONE ENCOUNTER
PA was received denied.  I found the PA form that required physician signature on Priscilla Noriega's desk.  It has been there since 10/19 which is why this PA was denied on 10/25.  I have moved the PA onto Dr. Kaba's desk.  Will see if insurance will process PA after it has been signed and we can avoid the appeal process.

## 2017-11-01 NOTE — TELEPHONE ENCOUNTER
Noted, thanks.  Will plan to continue med, paying out of pocket if that is okay with her.      Kalyan Hogan MD

## 2017-11-01 NOTE — TELEPHONE ENCOUNTER
"Notified her \"Ok, I figured, \"  \"Thanks, yes, I will continue to pay for it. \"    Candy MCELROY    "

## 2017-11-01 NOTE — TELEPHONE ENCOUNTER
"FYI:  PA for phentermine has been denied.  \"You have not been on a cole loss regimen for at least 6 months.  This should include a low calorie diet, increased physical activity, and behavior changes.  You have lost at least 1 pound per week or more while on your weight loss routine.\"    Pharmacy notified.   "

## 2017-11-07 ENCOUNTER — HOSPITAL ENCOUNTER (OUTPATIENT)
Dept: NUTRITION | Facility: CLINIC | Age: 37
Discharge: HOME | End: 2017-11-07
Attending: INTERNAL MEDICINE | Admitting: INTERNAL MEDICINE
Payer: COMMERCIAL

## 2017-11-07 PROCEDURE — 97802 MEDICAL NUTRITION INDIV IN: CPT | Performed by: DIETITIAN, REGISTERED

## 2017-11-09 NOTE — PROGRESS NOTES
[facility]  Medical Nutrition Therapy    Visit Type: Reassessment    Summer Smith referred by Dr. Hogan for MNT related to Obesity    Nutrition Assessment:  Anthropometrics  Wt Readings from Last 10 Encounters:   09/20/17 127.6 kg (281 lb 3.2 oz)   01/05/17 124.3 kg (274 lb)   01/04/17 122.9 kg (271 lb)   12/27/16 124.7 kg (275 lb)   11/25/16 123.4 kg (272 lb)   11/15/16 120.2 kg (265 lb)   10/25/16 123.4 kg (272 lb)   05/09/16 120.2 kg (265 lb)   04/08/16 120.7 kg (266 lb 1.6 oz)   01/08/16 127.9 kg (282 lb)     Nutrition History  Typical Food/Fluid Intake: Working on incorporating some carbohydrates  Meal/Snack Patterns: Consistent  Level of Knowledge: Basic knowledge  Beliefs and Attitudes: Readiness to change nutrition-related behaviors  Willingness to Learn: Acceptance    Patient is still on phentermine. Reports that she has been trying to balance her eating patterns and include several food groups at each meal. She has been keeping track of her hunger and fullness. Overall, she continues to work on previous goals. She reports she is feeling good about progress and feels as if she has more energy now that she added some carbohydrate back into eating patterns.     Physical Activity  Patient has started to incorporate some physical activity a few days/week.      Nutrition Prescription  Energy:  1974 kcals/day      Protein:  102 g/day         Fluid:  1 mL/kcal        Food Record  Not obtained at this visit.      Nutrition Diagnosis:  Problem: Food and nutrition-related knowledge deficit  Etiology: Related to no previous food- and nutrition-related education  Signs and Symptoms: As evidenced by reports no previous food- and nutrition-related education and exposure to inaccuration food- and nutrition-related information.  Evaluation: Improving    Nutrition Intervention:  Nutrition Education: Survival information  -Reiterated goals from last appointment. Encouraged patient to continue to work on  them. Patient did not want to go over any new information; she is feeling good about where she is at right now and is overwhelmed with her schooling at this time.    Nutrition Goals:  Continue with previous goals     Nutrition Follow Up / Monitoring:  Food- and nutrition-related knowledge; Meal/snack/beverage composition; Eating patterns     Nutrition Recommendations:  Patient to follow-up with RD as needed.   Patient has RD contact information to call/email if needed.    Natalie Sagastume RDN, LD  Clinical Dietitian  [phone]    Start time 1115  End time 1134

## 2017-11-15 ENCOUNTER — MYC MEDICAL ADVICE (OUTPATIENT)
Dept: FAMILY MEDICINE | Facility: CLINIC | Age: 37
End: 2017-11-15

## 2017-11-15 DIAGNOSIS — E66.01 OBESITY, CLASS III, BMI 40-49.9 (MORBID OBESITY) (H): ICD-10-CM

## 2017-11-15 RX ORDER — PHENTERMINE HYDROCHLORIDE 15 MG/1
15 CAPSULE ORAL EVERY MORNING
Qty: 30 CAPSULE | Refills: 0 | Status: SHIPPED | OUTPATIENT
Start: 2017-11-15 | End: 2018-04-02

## 2017-11-15 NOTE — TELEPHONE ENCOUNTER
Please see patient's mychart message.  Order pended for the 15mg dose of phentermine.    Yanni Velasco RN

## 2017-11-16 NOTE — TELEPHONE ENCOUNTER
Prescription faxed to [facility] in [place] [phone]. Pt notified.    [facility]

## 2017-11-21 ENCOUNTER — TELEPHONE (OUTPATIENT)
Dept: FAMILY MEDICINE | Facility: CLINIC | Age: 37
End: 2017-11-21

## 2017-11-21 NOTE — TELEPHONE ENCOUNTER
Patient's Saturday sister's dog jumped up on right arm.  The next day her arm just below the elbow developed a pimple looking bump.  She popped it and serous fluid came out.  The next day it formed again and some white pus came out.  Patient put antibiotic ointment.  Having pain in elbow and shoulder joint.  No redness, swelling, or fever.  Appointment made for tomorrow.    Yanni Velasco RN

## 2017-11-21 NOTE — TELEPHONE ENCOUNTER
Reason for call:  Patient reporting a symptom    Symptom or request: right arm pain    Duration (how long have symptoms been present): 3 days    Have you been treated for this before? Yes    Additional comments: pt calling stating she had an abscess drained and now has right arm pain and numbness that goes from where the abscess was up her arm. She states the wound looks like it's healing well so she isn't sure what's going on.    Phone Number patient can be reached at:  Home number on file [phone] (home)    Best Time:  any    Can we leave a detailed message on this number:  YES    Call taken on 11/21/2017 at 12:51 PM by Isa Milian

## 2017-11-22 ENCOUNTER — TELEPHONE (OUTPATIENT)
Dept: FAMILY MEDICINE | Facility: CLINIC | Age: 37
End: 2017-11-22

## 2017-11-22 ENCOUNTER — OFFICE VISIT (OUTPATIENT)
Dept: FAMILY MEDICINE | Facility: CLINIC | Age: 37
End: 2017-11-22
Payer: COMMERCIAL

## 2017-11-22 VITALS
DIASTOLIC BLOOD PRESSURE: 92 MMHG | BODY MASS INDEX: 45.95 KG/M2 | HEART RATE: 71 BPM | SYSTOLIC BLOOD PRESSURE: 148 MMHG | WEIGHT: 275.8 LBS | HEIGHT: 65 IN | OXYGEN SATURATION: 100 % | TEMPERATURE: 98.1 F

## 2017-11-22 DIAGNOSIS — L98.9 ARM SKIN LESION, RIGHT: ICD-10-CM

## 2017-11-22 DIAGNOSIS — M25.521 RIGHT ELBOW PAIN: Primary | ICD-10-CM

## 2017-11-22 PROCEDURE — 99213 OFFICE O/P EST LOW 20 MIN: CPT | Performed by: INTERNAL MEDICINE

## 2017-11-22 RX ORDER — CEPHALEXIN 500 MG/1
500 CAPSULE ORAL 2 TIMES DAILY
Qty: 20 CAPSULE | Refills: 0 | Status: SHIPPED | OUTPATIENT
Start: 2017-11-22 | End: 2018-02-08

## 2017-11-22 NOTE — TELEPHONE ENCOUNTER
Now have chills.  Took the keflex about 1130.  Started feeling fatigued in the afternoon.  Haven't taken temp.  Discussed with Dr. Kaba who advised that patient continue with the Keflex.  Patient was notified.    Yanni Velasco RN

## 2017-11-22 NOTE — PROGRESS NOTES
SUBJECTIVE:   Summer Smith is a 37 year old female who presents to clinic today for the following health issues:  Chief Complaint   Patient presents with     Arm Pain     x 5 days, pain in right elbow, sore/abscess appeared and then      Right elbow/arm pain      Duration: x 5 days     Description (location/character/radiation): Patient reports having some pain in right elbow and forearm and then noticing an abscess/sore on right forearm.  This drained white pus after she touched it but seems to be healing well.  Now pain radiates up to shoulder and has noticed some numbness in right hand, and will have waves of coldness that go down arm.      Intensity:  moderate    Accompanying signs and symptoms: maybe she has noticed enlarged nodes in the right axilla, no fevers or chills    History (similar episodes/previous evaluation): None    Precipitating or alleviating factors: None    Therapies tried and outcome: 600 mg ibuprofen every 6 hours.        Problem list and histories reviewed & adjusted, as indicated.  Additional history: as documented    Patient Active Problem List   Diagnosis     Personal history of DVT (deep vein thrombosis)     Recurrent pulmonary emboli (H)     May-Thurner syndrome     Mild intermittent reactive airway disease without complication     Varicose veins of legs     Postoperative hemorrhage involving genitourinary system following genitourinary procedure     Morbid obesity due to excess calories (H)     History of hysterectomy     Pericardial cyst     Left leg swelling     SOB (shortness of breath)     History of pulmonary embolism     Past Surgical History:   Procedure Laterality Date     AS LIGATN SHORT SAPHEN      had great saphenous vein removal     DAVINCI HYSTERECTOMY TOTAL      tubes removed, ovaries in place     STENT      venous in leg       Social History   Substance Use Topics     Smoking status: Never Smoker     Smokeless tobacco: Never Used     Alcohol use Yes     Family  History   Problem Relation Age of Onset     DIABETES Father      Colon Cancer Maternal Grandfather      Liver Cancer Maternal Grandfather      CANCER Paternal Grandfather      Thyroid Disease Mother      Thyroid Disease Sister      Thyroid Disease Sister      Endocrine Disease Daughter      Lucille, has appointment end of July 2016         Current Outpatient Prescriptions   Medication Sig Dispense Refill     cephALEXin (KEFLEX) 500 MG capsule Take 1 capsule (500 mg) by mouth 2 times daily 20 capsule 0     phentermine 15 MG capsule Take 1 capsule (15 mg) by mouth every morning 30 capsule 0     Cetirizine HCl (ZYRTEC ALLERGY PO)        dabigatran ANTICOAGULANT (PRADAXA) 150 MG CAPS capsule Take 1 capsule (150 mg) by mouth 2 times daily Store in original 's bottle or blister pack; use within 120 days of opening. Do not crush or open capsule. 60 capsule      Cholecalciferol (VITAMIN D3) 3000 UNITS TABS Take 1 tablet by mouth daily        fluticasone (FLONASE) 50 MCG/ACT nasal spray Spray 1-2 sprays into both nostrils daily 16 g 0     ferrous sulfate (IRON) 325 (65 FE) MG tablet Take 325 mg by mouth daily (with breakfast)        albuterol (PROAIR HFA, PROVENTIL HFA, VENTOLIN HFA) 108 (90 BASE) MCG/ACT inhaler Inhale 2 puffs into the lungs every 6 hours as needed        order for DME Equipment being ordered: thigh high compression socks 25-35 mm Hg.  Please measure 1 each 11     Allergies   Allergen Reactions     Sulfa Drugs Hives     Vitamin K Anaphylaxis     Vit K IV only     Augmented Betamethasone Diprop [Betamethasone]      Patient allergic to Augmentin ABX, not this med.  Please remove this entry     Penicillin G Rash     Amoxicillin-Pot Clavulanate Rash         Reviewed and updated as needed this visit by clinical staffTobacco  Allergies  Med Hx  Surg Hx  Fam Hx  Soc Hx      Reviewed and updated as needed this visit by Provider         ROS:  Constitutional, MSK, derm systems are negative, except as  "otherwise noted.      OBJECTIVE:   BP (!) 148/92 (BP Location: Left arm, Patient Position: Chair, Cuff Size: Adult Large)  Pulse 71  Temp 98.1  F (36.7  C) (Tympanic)  Ht 5' 5.25\" (1.657 m)  Wt 275 lb 12.8 oz (125.1 kg)  LMP 08/01/2016  SpO2 100%  BMI 45.54 kg/m2  Body mass index is 45.54 kg/(m^2).  GENERAL: healthy, alert and no distress  MS: soreness to the touch over the right olecranon bursa and the lateral-dorsal right forearm, but no swelling or redness or crepitus noted  SKIN: healing skin lesion on right forearm      ASSESSMENT/PLAN:       1. Right elbow pain  2. Arm skin lesion, right    Unclear etiology of her right elbow and forearm pain.  This did coincide with a skin lesion that drained pus, so possible concern for infection causing her pain but the lesion is healing well and there is no erythema on the skin to suggest cellulitis, no swelling or redness along the joint line to suggest joint infection.  Given the extent of her pain, we will cover for possible infection with Keflex, however.  Bursa does not appear inflamed.  Could be muscle strain, but this has not improved with ibuprofen.  Doesn't really seem radicular based on symptoms and exam either.  Will continue to monitor for the next week and if still not improving or symptoms worse, consider further work-up.     - cephALEXin (KEFLEX) 500 MG capsule; Take 1 capsule (500 mg) by mouth 2 times daily  Dispense: 20 capsule; Refill: 0    Kalyan Hogan MD  [facility]  "

## 2017-11-22 NOTE — TELEPHONE ENCOUNTER
Reason for call:  Patient reporting a symptom    Symptom or request: medication reaction    Duration (how long have symptoms been present): today after taking keflex    Have you been treated for this before? No    Additional comments: pt calling stating she was seen and treated today for elbow pain with keflex. She states that part way through the day she developed fatigue and the chills. She is wondering if this is from the keflex and if she needs something else. Her daughter is the only one with any kind of cold symptoms otherwise no one is sick.    Phone Number patient can be reached at:  Home number on file [phone] (home)    Best Time:  any    Can we leave a detailed message on this number:  YES    Call taken on 11/22/2017 at 4:24 PM by Isa Milian

## 2017-11-22 NOTE — MR AVS SNAPSHOT
"              After Visit Summary   11/22/2017    Summer Smith    MRN: [id number]           Patient Information     Date Of Birth          1980        Visit Information        Provider Department      11/22/2017 8:40 AM Kalyan Hogan MD [facility]        Today's Diagnoses     Right elbow pain    -  1    Arm skin lesion, right          Care Instructions    Follow up if not improving in another week or if symptoms worsen.          Follow-ups after your visit        Who to contact     If you have questions or need follow up information about today's clinic visit or your schedule please contact [facility] directly at [phone].  Normal or non-critical lab and imaging results will be communicated to you by ClickBushart, letter or phone within 4 business days after the clinic has received the results. If you do not hear from us within 7 days, please contact the clinic through ClickBushart or phone. If you have a critical or abnormal lab result, we will notify you by phone as soon as possible.  Submit refill requests through [employer] or call your pharmacy and they will forward the refill request to us. Please allow 3 business days for your refill to be completed.          Additional Information About Your Visit        MyChart Information     [employer] gives you secure access to your electronic health record. If you see a primary care provider, you can also send messages to your care team and make appointments. If you have questions, please call your primary care clinic.  If you do not have a primary care provider, please call [phone] and they will assist you.        Care EveryWhere ID     This is your Care EveryWhere ID. This could be used by other organizations to access your [facility] medical records  [id number]        Your Vitals Were     Pulse Temperature Height Last Period Pulse Oximetry BMI (Body Mass Index)    71 98.1  F (36.7  C) (Tympanic) 5' 5.25\" (1.657 m) 08/01/2016 100% 45.54 " kg/m2       Blood Pressure from Last 3 Encounters:   11/22/17 (!) 148/92   09/20/17 136/69   06/20/17 (!) 139/96    Weight from Last 3 Encounters:   11/22/17 275 lb 12.8 oz (125.1 kg)   09/20/17 281 lb 3.2 oz (127.6 kg)   01/05/17 274 lb (124.3 kg)              Today, you had the following     No orders found for display         Today's Medication Changes          These changes are accurate as of: 11/22/17  9:06 AM.  If you have any questions, ask your nurse or doctor.               Start taking these medicines.        Dose/Directions    cephALEXin 500 MG capsule   Commonly known as:  KEFLEX   Used for:  Right elbow pain, Arm skin lesion, right   Started by:  Kalyan Hogan MD        Dose:  500 mg   Take 1 capsule (500 mg) by mouth 2 times daily   Quantity:  20 capsule   Refills:  0            Where to get your medicines      These medications were sent to [place] [address] [place] [address] [address]     Phone:  [phone]     cephALEXin 500 MG capsule                Primary Care Provider Office Phone # Fax #    Kalyan Hogan [phone] [address] [facility] [address]        Equal Access to Services     RENETTA FARRAR AH: Hadii sahil thompson hadasho Soomaali, waaxda luqadaha, qaybta kaalmada adeegyada, waxay lissettein haykenny royal. So [facility] [phone].    ATENCIÓN: Si habla español, tiene a ames disposición servicios gratuitos de asistencia lingüística. Llame al [phone].    We comply with applicable federal civil rights laws and [place] laws. We do not discriminate on the basis of race, color, national origin, age, disability, sex, sexual orientation, or gender identity.            Thank you!     Thank you for choosing [facility]  for your care. Our goal is always to provide you with excellent care. Hearing back from our patients is one way we can continue to improve our services. Please take a few minutes to  complete the written survey that you may receive in the mail after your visit with us. Thank you!             Your Updated Medication List - Protect others around you: Learn how to safely use, store and throw away your medicines at www.disposemymeds.org.          This list is accurate as of: 11/22/17  9:06 AM.  Always use your most recent med list.                   Brand Name Dispense Instructions for use Diagnosis    albuterol 108 (90 BASE) MCG/ACT Inhaler    PROAIR HFA/PROVENTIL HFA/VENTOLIN HFA     Inhale 2 puffs into the lungs every 6 hours as needed        cephALEXin 500 MG capsule    KEFLEX    20 capsule    Take 1 capsule (500 mg) by mouth 2 times daily    Right elbow pain, Arm skin lesion, right       dabigatran ANTICOAGULANT 150 MG capsule    PRADAXA    60 capsule    Take 1 capsule (150 mg) by mouth 2 times daily Store in original 's bottle or blister pack; use within 120 days of opening. Do not crush or open capsule.        ferrous sulfate 325 (65 FE) MG tablet    IRON     Take 325 mg by mouth daily (with breakfast)        fluticasone 50 MCG/ACT spray    FLONASE    16 g    Spray 1-2 sprays into both nostrils daily    Post-nasal drainage       order for DME     1 each    Equipment being ordered: thigh high compression socks 25-35 mm Hg.  Please measure    Personal history of DVT (deep vein thrombosis)       phentermine 15 MG capsule     30 capsule    Take 1 capsule (15 mg) by mouth every morning    Obesity, Class III, BMI 40-49.9 (morbid obesity) (H)       Vitamin D3 3000 UNITS Tabs      Take 1 tablet by mouth daily        ZYRTEC ALLERGY PO

## 2017-11-22 NOTE — NURSING NOTE
"Chief Complaint   Patient presents with     Arm Pain     x 5 days, pain in right elbow, sore/abscess appeared and then        Initial BP (!) 148/92 (BP Location: Left arm, Patient Position: Chair, Cuff Size: Adult Large)  Pulse 71  Temp 98.1  F (36.7  C) (Tympanic)  Ht 5' 5.25\" (1.657 m)  Wt 275 lb 12.8 oz (125.1 kg)  LMP 08/01/2016  SpO2 100%  BMI 45.54 kg/m2 Estimated body mass index is 45.54 kg/(m^2) as calculated from the following:    Height as of this encounter: 5' 5.25\" (1.657 m).    Weight as of this encounter: 275 lb 12.8 oz (125.1 kg).  Medication Reconciliation: complete   Lisa WICK CMA (AAMA)    "

## 2018-01-10 ENCOUNTER — MYC MEDICAL ADVICE (OUTPATIENT)
Dept: FAMILY MEDICINE | Facility: CLINIC | Age: 38
End: 2018-01-10

## 2018-01-10 DIAGNOSIS — Z20.828 EXPOSURE TO INFLUENZA: Primary | ICD-10-CM

## 2018-01-10 RX ORDER — OSELTAMIVIR PHOSPHATE 75 MG/1
75 CAPSULE ORAL DAILY
Qty: 10 CAPSULE | Refills: 0 | Status: SHIPPED | OUTPATIENT
Start: 2018-01-10 | End: 2018-02-08

## 2018-02-08 ENCOUNTER — OFFICE VISIT (OUTPATIENT)
Dept: FAMILY MEDICINE | Facility: CLINIC | Age: 38
End: 2018-02-08
Payer: COMMERCIAL

## 2018-02-08 ENCOUNTER — RADIANT APPOINTMENT (OUTPATIENT)
Dept: GENERAL RADIOLOGY | Facility: CLINIC | Age: 38
End: 2018-02-08
Attending: NURSE PRACTITIONER
Payer: COMMERCIAL

## 2018-02-08 VITALS
TEMPERATURE: 98 F | HEART RATE: 69 BPM | RESPIRATION RATE: 16 BRPM | HEIGHT: 66 IN | BODY MASS INDEX: 44.03 KG/M2 | SYSTOLIC BLOOD PRESSURE: 128 MMHG | WEIGHT: 274 LBS | OXYGEN SATURATION: 98 % | DIASTOLIC BLOOD PRESSURE: 88 MMHG

## 2018-02-08 DIAGNOSIS — R11.2 NAUSEA AND VOMITING, INTRACTABILITY OF VOMITING NOT SPECIFIED, UNSPECIFIED VOMITING TYPE: ICD-10-CM

## 2018-02-08 DIAGNOSIS — J10.1 INFLUENZA A: Primary | ICD-10-CM

## 2018-02-08 DIAGNOSIS — I26.99 RECURRENT PULMONARY EMBOLI (H): Chronic | ICD-10-CM

## 2018-02-08 DIAGNOSIS — J45.20 MILD INTERMITTENT REACTIVE AIRWAY DISEASE WITHOUT COMPLICATION: ICD-10-CM

## 2018-02-08 DIAGNOSIS — R05.9 COUGH: ICD-10-CM

## 2018-02-08 LAB
FLUAV+FLUBV AG SPEC QL: NEGATIVE
FLUAV+FLUBV AG SPEC QL: POSITIVE
SPECIMEN SOURCE: ABNORMAL

## 2018-02-08 PROCEDURE — 99214 OFFICE O/P EST MOD 30 MIN: CPT | Performed by: NURSE PRACTITIONER

## 2018-02-08 PROCEDURE — 71046 X-RAY EXAM CHEST 2 VIEWS: CPT | Mod: FY

## 2018-02-08 PROCEDURE — 87804 INFLUENZA ASSAY W/OPTIC: CPT | Performed by: NURSE PRACTITIONER

## 2018-02-08 RX ORDER — PREDNISONE 20 MG/1
40 TABLET ORAL DAILY
Qty: 10 TABLET | Refills: 0 | Status: SHIPPED | OUTPATIENT
Start: 2018-02-08 | End: 2018-02-13

## 2018-02-08 RX ORDER — ALBUTEROL SULFATE 90 UG/1
2 AEROSOL, METERED RESPIRATORY (INHALATION) EVERY 4 HOURS PRN
Qty: 1 INHALER | Refills: 3 | Status: SHIPPED | OUTPATIENT
Start: 2018-02-08 | End: 2022-10-28

## 2018-02-08 RX ORDER — ONDANSETRON 4 MG/1
4 TABLET, FILM COATED ORAL EVERY 6 HOURS PRN
Qty: 18 TABLET | Refills: 1 | Status: SHIPPED | OUTPATIENT
Start: 2018-02-08 | End: 2018-04-02

## 2018-02-08 RX ORDER — OSELTAMIVIR PHOSPHATE 75 MG/1
75 CAPSULE ORAL 2 TIMES DAILY
Qty: 10 CAPSULE | Refills: 0 | Status: SHIPPED | OUTPATIENT
Start: 2018-02-08 | End: 2018-04-02

## 2018-02-08 NOTE — MR AVS SNAPSHOT
After Visit Summary   2/8/2018    Summer Smith    MRN: [id number]           Patient Information     Date Of Birth          1980        Visit Information        Provider Department      2/8/2018 10:40 AM Maria C Black APRN [facility]        Today's Diagnoses     Influenza A    -  1    Mild intermittent reactive airway disease without complication        Recurrent pulmonary emboli (H)        Nausea and vomiting, intractability of vomiting not specified, unspecified vomiting type          Care Instructions      Influenza (Adult)    Influenza is also called the flu. It is a viral illness that affects the air passages of your lungs. It is different from the common cold. The flu can easily be passed from one to person to another. It may be spread through the air by coughing and sneezing. Or it can be spread by touching the sick person and then touching your own eyes, nose, or mouth.  The flu starts 1 to 3 days after you are exposed to the flu virus. It may last for 1 to 2 weeks but many people feel tired or fatigued for many weeks afterward. You usually don t need to take antibiotics unless you have a complication. This might be an ear or sinus infection or pneumonia.  Symptoms of the flu may be mild or severe. They can include extreme tiredness (wanting to stay in bed all day), chills, fevers, muscle aches, soreness with eye movement, headache, and a dry, hacking cough.  Home care  Follow these guidelines when caring for yourself at home:    Avoid being around cigarette smoke, whether yours or other people s.    Acetaminophen or ibuprofen will help ease your fever, muscle aches, and headache. Don t give aspirin to anyone younger than 18 who has the flu. Aspirin can harm the liver.    Nausea and loss of appetite are common with the flu. Eat light meals. Drink 6 to 8 glasses of liquids every day. Good choices are water, sport drinks, soft drinks without caffeine, juices,  tea, and soup. Extra fluids will also help loosen secretions in your nose and lungs.    Over-the-counter cold medicines will not make the flu go away faster. But the medicines may help with coughing, sore throat, and congestion in your nose and sinuses. Don t use a decongestant if you have high blood pressure.    Stay home until your fever has been gone for at least 24 hours without using medicine to reduce fever.  Follow-up care  Follow up with your healthcare provider, or as advised, if you are not getting better over the next week.  If you are age 65 or older, talk with your provider about getting a pneumococcal vaccine every 5 years. You should also get this vaccine if you have chronic asthma or COPD. All adults should get a flu vaccine every fall. Ask your provider about this.  When to seek medical advice  Call your healthcare provider right away if any of these occur:    Cough with lots of colored mucus (sputum) or blood in your mucus    Chest pain, shortness of breath, wheezing, or trouble breathing    Severe headache, or face, neck, or ear pain    New rash with fever    Fever of 100.4 F (38 C) or higher, or as directed by your healthcare provider    Confusion, behavior change, or seizure    Severe weakness or dizziness    You get a new fever or cough after getting better for a few days  Date Last Reviewed: 1/1/2017 2000-2017 [employer]. [address] [place] [address]. All rights reserved. This information is not intended as a substitute for professional medical care. Always follow your healthcare professional's instructions.        The Flu (Influenza)     The virus that causes the flu spreads through the air in droplets when someone who has the flu coughs, sneezes, laughs, or talks.   The flu (influenza) is an infection that affects your respiratory tract. This tract is made up of your mouth, nose, and lungs, and the passages between them. Unlike a cold, the flu can make you very  ill. And it can lead to pneumonia, a serious lung infection. The flu can have serious complications and even cause death.  Who is at risk for the flu?  Anyone can get the flu. But you are more likely to become infected if you:    Have a weakened immune system    Work in a healthcare setting where you may be exposed to flu germs    Live or work with someone who has the flu    Haven t had an annual flu shot  How does the flu spread?  The flu is caused by a virus. The virus spreads through the air in droplets when someone who has the flu coughs, sneezes, laughs, or talks. You can become infected when you inhale these viruses directly. You can also become infected when you touch a surface on which the droplets have landed and then transfer the germs to your eyes, nose, or mouth. Touching used tissues, or sharing utensils, drinking glasses, or a toothbrush from an infected person can expose you to flu viruses, too.  What are the symptoms of the flu?  Flu symptoms tend to come on quickly and may last a few days to a few weeks. They include:    Fever usually higher than 100.4 F  (38 C) and chills    Sore throat and headache    Dry cough    Runny nose    Tiredness and weakness    Muscle aches  Who is at risk for flu complications?  For some people, the flu can be very serious. The risk for complications is greater for:    Children younger than age 5    Adults ages 65 and older    People with a chronic illness such as diabetes or heart, kidney, or lung disease    People who live in a nursing home or long-term care facility   How is the flu treated?  The flu usually gets better after 7 days or so. In some cases, your healthcare provider may prescribe an antiviral medicine. This may help you get well a little sooner. For the medicine to help, you need to take it as soon as possible (ideally within 48 hours) after your symptoms start. If you develop pneumonia or other serious illness, you may need to stay in the  hospital.  Easing flu symptoms    Drink lots of fluids such as water, juice, and warm soup. A good rule is to drink enough so that you urinate your normal amount.    Get plenty of rest.    Ask your healthcare provider what to take for fever and pain.    Call your provider if your fever is 100.4 F (38 C) or higher, or you become dizzy, lightheaded, or short of breath.  Taking steps to protect others    Wash your hands often, especially after coughing or sneezing. Or clean your hands with an alcohol-based hand  containing at least 60% alcohol.    Cough or sneeze into a tissue. Then throw the tissue away and wash your hands. If you don t have a tissue, cough and sneeze into your elbow.    Stay home until at least 24 hours after you no longer have a fever or chills. Be sure the fever isn t being hidden by fever-reducing medicine.    Don t share food, utensils, drinking glasses, or a toothbrush with others.    Ask your healthcare provider if others in your household should get antiviral medicine to help them avoid infection.  How can the flu be prevented?    One of the best ways to avoid the flu is to get a flu vaccine each year. The virus that causes the flu changes from year to year. For that reason, healthcare providers recommend getting the flu vaccine each year, as soon as it's available in your area. The vaccine is given as a shot. Your healthcare provider can tell you which vaccine is right for you. A nasal spray is also available but is not recommended for the 2520-3097 flu season. The CDC says this is because the nasal spray did not seem to protect against the flu over the last several flu seasons. In the past, it was meant for people ages 2 to 49.    Wash your hands often. Frequent handwashing is a proven way to help prevent infection.    Carry an alcohol-based hand gel containing at least 60% alcohol. Use it when you can't use soap and water. Then wash your hands as soon as you can.    Avoid touching  your eyes, nose, and mouth.    At home and work, clean phones, computer keyboards, and toys often with disinfectant wipes.    If possible, avoid close contact with others who have the flu or symptoms of the flu.  Handwashing tips  Handwashing is one of the best ways to prevent many common infections. If you are caring for or visiting someone with the flu, wash your hands each time you enter and leave the room. Follow these steps:    Use warm water and plenty of soap. Rub your hands together well.    Clean the whole hand, including under your nails, between your fingers, and up the wrists.    Wash for at least 15 seconds.    Rinse, letting the water run down your fingers, not up your wrists.    Dry your hands well. Use a paper towel to turn off the faucet and open the door.  Using alcohol-based hand   Alcohol-based hand  are also a good choice. Use them when you can't use soap and water. Follow these steps:    Squeeze about a tablespoon of gel into the palm of one hand.    Rub your hands together briskly, cleaning the backs of your hands, the palms, between your fingers, and up the wrists.    Rub until the gel is gone and your hands are completely dry.  Preventing the flu in healthcare settings  The flu is a special concern for people in hospitals and long-term care facilities. To help prevent the spread of flu, many hospitals and nursing homes take these steps:    Healthcare providers wash their hands or use an alcohol-based hand  before and after treating each patient.    People with the flu have private rooms and bathrooms or share a room with someone with the same infection.    People who are at high risk for the flu but don't have it are encouraged to get the flu and pneumonia vaccines.    All healthcare workers are encouraged or required to get flu shots.   Date Last Reviewed: 12/1/2016 2000-2017 [employer]. [address], [place] [address]. All rights reserved.  "This information is not intended as a substitute for professional medical care. Always follow your healthcare professional's instructions.                Follow-ups after your visit        Who to contact     If you have questions or need follow up information about today's clinic visit or your schedule please contact [facility] directly at [phone].  Normal or non-critical lab and imaging results will be communicated to you by MyChart, letter or phone within 4 business days after the clinic has received the results. If you do not hear from us within 7 days, please contact the clinic through [employer] or phone. If you have a critical or abnormal lab result, we will notify you by phone as soon as possible.  Submit refill requests through [employer] or call your pharmacy and they will forward the refill request to us. Please allow 3 business days for your refill to be completed.          Additional Information About Your Visit        MyChart Information     [employer] gives you secure access to your electronic health record. If you see a primary care provider, you can also send messages to your care team and make appointments. If you have questions, please call your primary care clinic.  If you do not have a primary care provider, please call [phone] and they will assist you.        Care EveryWhere ID     This is your Care EveryWhere ID. This could be used by other organizations to access your [facility] medical records  [id number]        Your Vitals Were     Pulse Temperature Respirations Height Last Period Pulse Oximetry    69 98  F (36.7  C) (Tympanic) 16 5' 5.5\" (1.664 m) 08/01/2016 98%    BMI (Body Mass Index)                   44.9 kg/m2            Blood Pressure from Last 3 Encounters:   02/08/18 128/90   11/22/17 (!) 148/92   09/20/17 136/69    Weight from Last 3 Encounters:   02/08/18 274 lb (124.3 kg)   11/22/17 275 lb 12.8 oz (125.1 kg)   09/20/17 281 lb 3.2 oz (127.6 kg)              We " Performed the Following     Influenza A/B antigen          Today's Medication Changes          These changes are accurate as of 2/8/18 11:19 AM.  If you have any questions, ask your nurse or doctor.               Start taking these medicines.        Dose/Directions    ondansetron 4 MG tablet   Commonly known as:  ZOFRAN   Used for:  Nausea and vomiting, intractability of vomiting not specified, unspecified vomiting type   Started by:  Maria C Black APRN CNP        Dose:  4 mg   Take 1 tablet (4 mg) by mouth every 6 hours as needed for nausea or vomiting   Quantity:  18 tablet   Refills:  1       oseltamivir 75 MG capsule   Commonly known as:  TAMIFLU   Used for:  Influenza A   Started by:  Maria C Black APRN CNP        Dose:  75 mg   Take 1 capsule (75 mg) by mouth 2 times daily   Quantity:  10 capsule   Refills:  0       predniSONE 20 MG tablet   Commonly known as:  DELTASONE   Used for:  Mild intermittent reactive airway disease without complication, Influenza A   Started by:  Maria C Black APRN CNP        Dose:  40 mg   Take 2 tablets (40 mg) by mouth daily for 5 days   Quantity:  10 tablet   Refills:  0         These medicines have changed or have updated prescriptions.        Dose/Directions    albuterol 108 (90 BASE) MCG/ACT Inhaler   Commonly known as:  PROAIR HFA/PROVENTIL HFA/VENTOLIN HFA   This may have changed:  when to take this   Used for:  Mild intermittent reactive airway disease without complication   Changed by:  Maria C Black APRN CNP        Dose:  2 puff   Inhale 2 puffs into the lungs every 4 hours as needed   Quantity:  1 Inhaler   Refills:  3            Where to get your medicines      These medications were sent to [facility] [place], [place] - [address]  [address] [facility] [address]     Phone:  [phone]     albuterol 108 (90 BASE) MCG/ACT Inhaler    ondansetron 4 MG tablet    oseltamivir 75 MG capsule         Some of these will  need a paper prescription and others can be bought over the counter.  Ask your nurse if you have questions.     Bring a paper prescription for each of these medications     predniSONE 20 MG tablet                Primary Care Provider Office Phone # Fax #    Kalyan Hogan [phone] [address] [facility] [address]        Equal Access to Services     RENETTA FARRAR : Hadii aad ku hadasho Soomaali, waaxda luqadaha, qaybta kaalmada adeegyada, waxay lissettein hayaan adekeshav khdaisysaroj tran . So [facility] [phone].    ATENCIÓN: Si habla español, tiene a ames disposición servicios gratuitos de asistencia lingüística. Llame al [phone].    We comply with applicable federal civil rights laws and [place] laws. We do not discriminate on the basis of race, color, national origin, age, disability, sex, sexual orientation, or gender identity.            Thank you!     Thank you for choosing [facility]  for your care. Our goal is always to provide you with excellent care. Hearing back from our patients is one way we can continue to improve our services. Please take a few minutes to complete the written survey that you may receive in the mail after your visit with us. Thank you!             Your Updated Medication List - Protect others around you: Learn how to safely use, store and throw away your medicines at www.disposemymeds.org.          This list is accurate as of 2/8/18 11:19 AM.  Always use your most recent med list.                   Brand Name Dispense Instructions for use Diagnosis    albuterol 108 (90 BASE) MCG/ACT Inhaler    PROAIR HFA/PROVENTIL HFA/VENTOLIN HFA    1 Inhaler    Inhale 2 puffs into the lungs every 4 hours as needed    Mild intermittent reactive airway disease without complication       dabigatran ANTICOAGULANT 150 MG capsule    PRADAXA    60 capsule    Take 1 capsule (150 mg) by mouth 2 times daily Store in original 's bottle or blister pack; use within  120 days of opening. Do not crush or open capsule.        fluticasone 50 MCG/ACT spray    FLONASE    16 g    Spray 1-2 sprays into both nostrils daily    Post-nasal drainage       ondansetron 4 MG tablet    ZOFRAN    18 tablet    Take 1 tablet (4 mg) by mouth every 6 hours as needed for nausea or vomiting    Nausea and vomiting, intractability of vomiting not specified, unspecified vomiting type       order for DME     1 each    Equipment being ordered: thigh high compression socks 25-35 mm Hg.  Please measure    Personal history of DVT (deep vein thrombosis)       oseltamivir 75 MG capsule    TAMIFLU    10 capsule    Take 1 capsule (75 mg) by mouth 2 times daily    Influenza A       phentermine 15 MG capsule     30 capsule    Take 1 capsule (15 mg) by mouth every morning    Obesity, Class III, BMI 40-49.9 (morbid obesity) (H)       predniSONE 20 MG tablet    DELTASONE    10 tablet    Take 2 tablets (40 mg) by mouth daily for 5 days    Mild intermittent reactive airway disease without complication, Influenza A       Vitamin D3 3000 UNITS Tabs      Take 1 tablet by mouth daily        ZYRTEC ALLERGY PO

## 2018-02-08 NOTE — NURSING NOTE
"Chief Complaint   Patient presents with     Cold Symptoms       Initial /90 (BP Location: Right arm, Patient Position: Sitting, Cuff Size: Adult Large)  Pulse 69  Temp 98  F (36.7  C) (Tympanic)  Resp 16  Ht 5' 5.5\" (1.664 m)  Wt 274 lb (124.3 kg)  LMP 08/01/2016  SpO2 98%  BMI 44.9 kg/m2 Estimated body mass index is 44.9 kg/(m^2) as calculated from the following:    Height as of this encounter: 5' 5.5\" (1.664 m).    Weight as of this encounter: 274 lb (124.3 kg).  Medication Reconciliation: complete  "

## 2018-02-08 NOTE — PATIENT INSTRUCTIONS
Influenza (Adult)    Influenza is also called the flu. It is a viral illness that affects the air passages of your lungs. It is different from the common cold. The flu can easily be passed from one to person to another. It may be spread through the air by coughing and sneezing. Or it can be spread by touching the sick person and then touching your own eyes, nose, or mouth.  The flu starts 1 to 3 days after you are exposed to the flu virus. It may last for 1 to 2 weeks but many people feel tired or fatigued for many weeks afterward. You usually don t need to take antibiotics unless you have a complication. This might be an ear or sinus infection or pneumonia.  Symptoms of the flu may be mild or severe. They can include extreme tiredness (wanting to stay in bed all day), chills, fevers, muscle aches, soreness with eye movement, headache, and a dry, hacking cough.  Home care  Follow these guidelines when caring for yourself at home:    Avoid being around cigarette smoke, whether yours or other people s.    Acetaminophen or ibuprofen will help ease your fever, muscle aches, and headache. Don t give aspirin to anyone younger than 18 who has the flu. Aspirin can harm the liver.    Nausea and loss of appetite are common with the flu. Eat light meals. Drink 6 to 8 glasses of liquids every day. Good choices are water, sport drinks, soft drinks without caffeine, juices, tea, and soup. Extra fluids will also help loosen secretions in your nose and lungs.    Over-the-counter cold medicines will not make the flu go away faster. But the medicines may help with coughing, sore throat, and congestion in your nose and sinuses. Don t use a decongestant if you have high blood pressure.    Stay home until your fever has been gone for at least 24 hours without using medicine to reduce fever.  Follow-up care  Follow up with your healthcare provider, or as advised, if you are not getting better over the next week.  If you are age 65 or  older, talk with your provider about getting a pneumococcal vaccine every 5 years. You should also get this vaccine if you have chronic asthma or COPD. All adults should get a flu vaccine every fall. Ask your provider about this.  When to seek medical advice  Call your healthcare provider right away if any of these occur:    Cough with lots of colored mucus (sputum) or blood in your mucus    Chest pain, shortness of breath, wheezing, or trouble breathing    Severe headache, or face, neck, or ear pain    New rash with fever    Fever of 100.4 F (38 C) or higher, or as directed by your healthcare provider    Confusion, behavior change, or seizure    Severe weakness or dizziness    You get a new fever or cough after getting better for a few days  Date Last Reviewed: 1/1/2017 2000-2017 [employer]. [address] [place] [address]. All rights reserved. This information is not intended as a substitute for professional medical care. Always follow your healthcare professional's instructions.        The Flu (Influenza)     The virus that causes the flu spreads through the air in droplets when someone who has the flu coughs, sneezes, laughs, or talks.   The flu (influenza) is an infection that affects your respiratory tract. This tract is made up of your mouth, nose, and lungs, and the passages between them. Unlike a cold, the flu can make you very ill. And it can lead to pneumonia, a serious lung infection. The flu can have serious complications and even cause death.  Who is at risk for the flu?  Anyone can get the flu. But you are more likely to become infected if you:    Have a weakened immune system    Work in a healthcare setting where you may be exposed to flu germs    Live or work with someone who has the flu    Haven t had an annual flu shot  How does the flu spread?  The flu is caused by a virus. The virus spreads through the air in droplets when someone who has the flu coughs, sneezes,  laughs, or talks. You can become infected when you inhale these viruses directly. You can also become infected when you touch a surface on which the droplets have landed and then transfer the germs to your eyes, nose, or mouth. Touching used tissues, or sharing utensils, drinking glasses, or a toothbrush from an infected person can expose you to flu viruses, too.  What are the symptoms of the flu?  Flu symptoms tend to come on quickly and may last a few days to a few weeks. They include:    Fever usually higher than 100.4 F  (38 C) and chills    Sore throat and headache    Dry cough    Runny nose    Tiredness and weakness    Muscle aches  Who is at risk for flu complications?  For some people, the flu can be very serious. The risk for complications is greater for:    Children younger than age 5    Adults ages 65 and older    People with a chronic illness such as diabetes or heart, kidney, or lung disease    People who live in a nursing home or long-term care facility   How is the flu treated?  The flu usually gets better after 7 days or so. In some cases, your healthcare provider may prescribe an antiviral medicine. This may help you get well a little sooner. For the medicine to help, you need to take it as soon as possible (ideally within 48 hours) after your symptoms start. If you develop pneumonia or other serious illness, you may need to stay in the hospital.  Easing flu symptoms    Drink lots of fluids such as water, juice, and warm soup. A good rule is to drink enough so that you urinate your normal amount.    Get plenty of rest.    Ask your healthcare provider what to take for fever and pain.    Call your provider if your fever is 100.4 F (38 C) or higher, or you become dizzy, lightheaded, or short of breath.  Taking steps to protect others    Wash your hands often, especially after coughing or sneezing. Or clean your hands with an alcohol-based hand  containing at least 60% alcohol.    Cough or sneeze  into a tissue. Then throw the tissue away and wash your hands. If you don t have a tissue, cough and sneeze into your elbow.    Stay home until at least 24 hours after you no longer have a fever or chills. Be sure the fever isn t being hidden by fever-reducing medicine.    Don t share food, utensils, drinking glasses, or a toothbrush with others.    Ask your healthcare provider if others in your household should get antiviral medicine to help them avoid infection.  How can the flu be prevented?    One of the best ways to avoid the flu is to get a flu vaccine each year. The virus that causes the flu changes from year to year. For that reason, healthcare providers recommend getting the flu vaccine each year, as soon as it's available in your area. The vaccine is given as a shot. Your healthcare provider can tell you which vaccine is right for you. A nasal spray is also available but is not recommended for the 2018-3766 flu season. The CDC says this is because the nasal spray did not seem to protect against the flu over the last several flu seasons. In the past, it was meant for people ages 2 to 49.    Wash your hands often. Frequent handwashing is a proven way to help prevent infection.    Carry an alcohol-based hand gel containing at least 60% alcohol. Use it when you can't use soap and water. Then wash your hands as soon as you can.    Avoid touching your eyes, nose, and mouth.    At home and work, clean phones, computer keyboards, and toys often with disinfectant wipes.    If possible, avoid close contact with others who have the flu or symptoms of the flu.  Handwashing tips  Handwashing is one of the best ways to prevent many common infections. If you are caring for or visiting someone with the flu, wash your hands each time you enter and leave the room. Follow these steps:    Use warm water and plenty of soap. Rub your hands together well.    Clean the whole hand, including under your nails, between your fingers,  and up the wrists.    Wash for at least 15 seconds.    Rinse, letting the water run down your fingers, not up your wrists.    Dry your hands well. Use a paper towel to turn off the faucet and open the door.  Using alcohol-based hand   Alcohol-based hand  are also a good choice. Use them when you can't use soap and water. Follow these steps:    Squeeze about a tablespoon of gel into the palm of one hand.    Rub your hands together briskly, cleaning the backs of your hands, the palms, between your fingers, and up the wrists.    Rub until the gel is gone and your hands are completely dry.  Preventing the flu in healthcare settings  The flu is a special concern for people in hospitals and long-term care facilities. To help prevent the spread of flu, many hospitals and nursing homes take these steps:    Healthcare providers wash their hands or use an alcohol-based hand  before and after treating each patient.    People with the flu have private rooms and bathrooms or share a room with someone with the same infection.    People who are at high risk for the flu but don't have it are encouraged to get the flu and pneumonia vaccines.    All healthcare workers are encouraged or required to get flu shots.   Date Last Reviewed: 12/1/2016 2000-2017 [employer]. [address] [place] [address], [place] [address]. All rights reserved. This information is not intended as a substitute for professional medical care. Always follow your healthcare professional's instructions.

## 2018-02-08 NOTE — PROGRESS NOTES
SUBJECTIVE:   Summer Smith is a 37 year old female who presents to clinic today for the following health issues:    Hx of asthma and recurrent PE- on pradaxa. Denies recent leg swelling/pain.    Acute Illness   Acute illness concerns: flu symptoms, SOB with asthma-been using inhaler but it hasn't been helping  Onset: 2 days    Fever: YES- highest temp was 102.7-2 days ago, 101 last night    Chills/Sweats: YES    Headache (location?): YES    Sinus Pressure:no    Conjunctivitis:  no    Ear Pain: YES: bilateral pressure    Rhinorrhea: YES- clear     Congestion: YES- nasal and chest    Sore Throat: YES- mild-believes it to be from vomiting     Cough: YES-non-productive, with shortness of breath-hx of asthma, pain in sternum posttussive    Wheeze: YES    Decreased Appetite: YES    Nausea: YES    Vomiting: YES- from nausea, 2 days ago    Diarrhea:  no     Dysuria/Freq.: no     Fatigue/Achiness: YES- both    Sick/Strep Exposure: YES- family gathering last weekend     Therapies Tried and outcome: ibuprofen and tylenol, inhaler-no relief.      Problem list and histories reviewed & adjusted, as indicated.  Additional history: as documented    Patient Active Problem List   Diagnosis     Personal history of DVT (deep vein thrombosis)     Recurrent pulmonary emboli (H)     May-Thurner syndrome     Mild intermittent reactive airway disease without complication     Varicose veins of legs     Postoperative hemorrhage involving genitourinary system following genitourinary procedure     Morbid obesity due to excess calories (H)     History of hysterectomy     Pericardial cyst     Left leg swelling     SOB (shortness of breath)     History of pulmonary embolism     Past Surgical History:   Procedure Laterality Date     AS LIGATDEENA SHORT SAPHEN      had great saphenous vein removal     DAVINCI HYSTERECTOMY TOTAL      tubes removed, ovaries in place     STENT      venous in leg       Social History   Substance Use Topics      "Smoking status: Never Smoker     Smokeless tobacco: Never Used     Alcohol use Yes      Comment: rare     Family History   Problem Relation Age of Onset     DIABETES Father      Colon Cancer Maternal Grandfather      Liver Cancer Maternal Grandfather      CANCER Paternal Grandfather      Thyroid Disease Mother      Thyroid Disease Sister      Thyroid Disease Sister      Endocrine Disease Daughter      Lucille, has appointment end of July 2016           Reviewed and updated as needed this visit by clinical staff  Tobacco  Allergies  Meds  Med Hx  Surg Hx  Fam Hx  Soc Hx      Reviewed and updated as needed this visit by Provider         ROS:  Constitutional, HEENT, cardiovascular, pulmonary, gi and gu systems are negative, except as otherwise noted.    OBJECTIVE:     /90 (BP Location: Right arm, Patient Position: Sitting, Cuff Size: Adult Large)  Pulse 69  Temp 98  F (36.7  C) (Tympanic)  Resp 16  Ht 5' 5.5\" (1.664 m)  Wt 274 lb (124.3 kg)  LMP 08/01/2016  SpO2 98%  BMI 44.9 kg/m2  Body mass index is 44.9 kg/(m^2).  GENERAL: alert, no distress and fatigued  EYES: Eyes grossly normal to inspection, PERRL and conjunctivae and sclerae normal  HENT: normal cephalic/atraumatic, both ears: clear effusion, nose and mouth without ulcers or lesions, nasal mucosa edematous , oropharynx clear, oral mucous membranes moist and sinuses: not tender  NECK: no adenopathy, no asymmetry, masses, or scars and thyroid normal to palpation  RESP: decreased breath sounds throughout  CV: regular rates and rhythm, normal S1 S2, no S3 or S4, no murmur, click or rub, peripheral pulses strong and no peripheral edema  MS: no gross musculoskeletal defects noted, no edema  SKIN: no suspicious lesions or rashes  NEURO: Normal strength and tone, mentation intact and speech normal    Diagnostic Test Results:  Results for orders placed or performed in visit on 02/08/18 (from the past 24 hour(s))   Influenza A/B antigen   Result Value " Ref Range    Influenza A/B Agn Specimen Nasal     Influenza A Positive (A) NEG^Negative    Influenza B Negative NEG^Negative     CXR - negative    ASSESSMENT/PLAN:       ICD-10-CM    1. Influenza A J10.1 Influenza A/B antigen     XR Chest 2 Views     oseltamivir (TAMIFLU) 75 MG capsule     predniSONE (DELTASONE) 20 MG tablet   2. Mild intermittent reactive airway disease without complication J45.20 albuterol (PROAIR HFA/PROVENTIL HFA/VENTOLIN HFA) 108 (90 BASE) MCG/ACT Inhaler     predniSONE (DELTASONE) 20 MG tablet   3. Recurrent pulmonary emboli (H) I26.99    4. Nausea and vomiting, intractability of vomiting not specified, unspecified vomiting type R11.2 ondansetron (ZOFRAN) 4 MG tablet       FUTURE APPOINTMENTS:       - Follow-up visit in 3-5 days for persistent symptoms, sooner PRN new or worsening symptoms.     Patient Instructions       Influenza (Adult)    Influenza is also called the flu. It is a viral illness that affects the air passages of your lungs. It is different from the common cold. The flu can easily be passed from one to person to another. It may be spread through the air by coughing and sneezing. Or it can be spread by touching the sick person and then touching your own eyes, nose, or mouth.  The flu starts 1 to 3 days after you are exposed to the flu virus. It may last for 1 to 2 weeks but many people feel tired or fatigued for many weeks afterward. You usually don t need to take antibiotics unless you have a complication. This might be an ear or sinus infection or pneumonia.  Symptoms of the flu may be mild or severe. They can include extreme tiredness (wanting to stay in bed all day), chills, fevers, muscle aches, soreness with eye movement, headache, and a dry, hacking cough.  Home care  Follow these guidelines when caring for yourself at home:    Avoid being around cigarette smoke, whether yours or other people s.    Acetaminophen or ibuprofen will help ease your fever, muscle aches, and  headache. Don t give aspirin to anyone younger than 18 who has the flu. Aspirin can harm the liver.    Nausea and loss of appetite are common with the flu. Eat light meals. Drink 6 to 8 glasses of liquids every day. Good choices are water, sport drinks, soft drinks without caffeine, juices, tea, and soup. Extra fluids will also help loosen secretions in your nose and lungs.    Over-the-counter cold medicines will not make the flu go away faster. But the medicines may help with coughing, sore throat, and congestion in your nose and sinuses. Don t use a decongestant if you have high blood pressure.    Stay home until your fever has been gone for at least 24 hours without using medicine to reduce fever.  Follow-up care  Follow up with your healthcare provider, or as advised, if you are not getting better over the next week.  If you are age 65 or older, talk with your provider about getting a pneumococcal vaccine every 5 years. You should also get this vaccine if you have chronic asthma or COPD. All adults should get a flu vaccine every fall. Ask your provider about this.  When to seek medical advice  Call your healthcare provider right away if any of these occur:    Cough with lots of colored mucus (sputum) or blood in your mucus    Chest pain, shortness of breath, wheezing, or trouble breathing    Severe headache, or face, neck, or ear pain    New rash with fever    Fever of 100.4 F (38 C) or higher, or as directed by your healthcare provider    Confusion, behavior change, or seizure    Severe weakness or dizziness    You get a new fever or cough after getting better for a few days  Date Last Reviewed: 1/1/2017 2000-2017 [employer]. [address] [place] [address], [place] [address]. All rights reserved. This information is not intended as a substitute for professional medical care. Always follow your healthcare professional's instructions.        The Flu (Influenza)     The virus that causes the flu spreads  through the air in droplets when someone who has the flu coughs, sneezes, laughs, or talks.   The flu (influenza) is an infection that affects your respiratory tract. This tract is made up of your mouth, nose, and lungs, and the passages between them. Unlike a cold, the flu can make you very ill. And it can lead to pneumonia, a serious lung infection. The flu can have serious complications and even cause death.  Who is at risk for the flu?  Anyone can get the flu. But you are more likely to become infected if you:    Have a weakened immune system    Work in a healthcare setting where you may be exposed to flu germs    Live or work with someone who has the flu    Haven t had an annual flu shot  How does the flu spread?  The flu is caused by a virus. The virus spreads through the air in droplets when someone who has the flu coughs, sneezes, laughs, or talks. You can become infected when you inhale these viruses directly. You can also become infected when you touch a surface on which the droplets have landed and then transfer the germs to your eyes, nose, or mouth. Touching used tissues, or sharing utensils, drinking glasses, or a toothbrush from an infected person can expose you to flu viruses, too.  What are the symptoms of the flu?  Flu symptoms tend to come on quickly and may last a few days to a few weeks. They include:    Fever usually higher than 100.4 F  (38 C) and chills    Sore throat and headache    Dry cough    Runny nose    Tiredness and weakness    Muscle aches  Who is at risk for flu complications?  For some people, the flu can be very serious. The risk for complications is greater for:    Children younger than age 5    Adults ages 65 and older    People with a chronic illness such as diabetes or heart, kidney, or lung disease    People who live in a nursing home or long-term care facility   How is the flu treated?  The flu usually gets better after 7 days or so. In some cases, your healthcare  provider may prescribe an antiviral medicine. This may help you get well a little sooner. For the medicine to help, you need to take it as soon as possible (ideally within 48 hours) after your symptoms start. If you develop pneumonia or other serious illness, you may need to stay in the hospital.  Easing flu symptoms    Drink lots of fluids such as water, juice, and warm soup. A good rule is to drink enough so that you urinate your normal amount.    Get plenty of rest.    Ask your healthcare provider what to take for fever and pain.    Call your provider if your fever is 100.4 F (38 C) or higher, or you become dizzy, lightheaded, or short of breath.  Taking steps to protect others    Wash your hands often, especially after coughing or sneezing. Or clean your hands with an alcohol-based hand  containing at least 60% alcohol.    Cough or sneeze into a tissue. Then throw the tissue away and wash your hands. If you don t have a tissue, cough and sneeze into your elbow.    Stay home until at least 24 hours after you no longer have a fever or chills. Be sure the fever isn t being hidden by fever-reducing medicine.    Don t share food, utensils, drinking glasses, or a toothbrush with others.    Ask your healthcare provider if others in your household should get antiviral medicine to help them avoid infection.  How can the flu be prevented?    One of the best ways to avoid the flu is to get a flu vaccine each year. The virus that causes the flu changes from year to year. For that reason, healthcare providers recommend getting the flu vaccine each year, as soon as it's available in your area. The vaccine is given as a shot. Your healthcare provider can tell you which vaccine is right for you. A nasal spray is also available but is not recommended for the 4601-4082 flu season. The CDC says this is because the nasal spray did not seem to protect against the flu over the last several flu seasons. In the past, it was  meant for people ages 2 to 49.    Wash your hands often. Frequent handwashing is a proven way to help prevent infection.    Carry an alcohol-based hand gel containing at least 60% alcohol. Use it when you can't use soap and water. Then wash your hands as soon as you can.    Avoid touching your eyes, nose, and mouth.    At home and work, clean phones, computer keyboards, and toys often with disinfectant wipes.    If possible, avoid close contact with others who have the flu or symptoms of the flu.  Handwashing tips  Handwashing is one of the best ways to prevent many common infections. If you are caring for or visiting someone with the flu, wash your hands each time you enter and leave the room. Follow these steps:    Use warm water and plenty of soap. Rub your hands together well.    Clean the whole hand, including under your nails, between your fingers, and up the wrists.    Wash for at least 15 seconds.    Rinse, letting the water run down your fingers, not up your wrists.    Dry your hands well. Use a paper towel to turn off the faucet and open the door.  Using alcohol-based hand   Alcohol-based hand  are also a good choice. Use them when you can't use soap and water. Follow these steps:    Squeeze about a tablespoon of gel into the palm of one hand.    Rub your hands together briskly, cleaning the backs of your hands, the palms, between your fingers, and up the wrists.    Rub until the gel is gone and your hands are completely dry.  Preventing the flu in healthcare settings  The flu is a special concern for people in hospitals and long-term care facilities. To help prevent the spread of flu, many hospitals and nursing homes take these steps:    Healthcare providers wash their hands or use an alcohol-based hand  before and after treating each patient.    People with the flu have private rooms and bathrooms or share a room with someone with the same infection.    People who are at high risk  for the flu but don't have it are encouraged to get the flu and pneumonia vaccines.    All healthcare workers are encouraged or required to get flu shots.   Date Last Reviewed: 12/1/2016 2000-2017 [employer]. [address] [place] [address], [place] [address]. All rights reserved. This information is not intended as a substitute for professional medical care. Always follow your healthcare professional's instructions.            KINGA Pearce [facility]

## 2018-04-02 ENCOUNTER — OFFICE VISIT (OUTPATIENT)
Dept: FAMILY MEDICINE | Facility: CLINIC | Age: 38
End: 2018-04-02
Payer: COMMERCIAL

## 2018-04-02 VITALS
BODY MASS INDEX: 46.65 KG/M2 | HEART RATE: 65 BPM | HEIGHT: 65 IN | TEMPERATURE: 98 F | RESPIRATION RATE: 16 BRPM | SYSTOLIC BLOOD PRESSURE: 138 MMHG | DIASTOLIC BLOOD PRESSURE: 83 MMHG | WEIGHT: 280 LBS

## 2018-04-02 DIAGNOSIS — Z11.1 SCREENING EXAMINATION FOR PULMONARY TUBERCULOSIS: ICD-10-CM

## 2018-04-02 DIAGNOSIS — Z00.00 ROUTINE GENERAL MEDICAL EXAMINATION AT A HEALTH CARE FACILITY: Primary | ICD-10-CM

## 2018-04-02 DIAGNOSIS — J45.20 MILD INTERMITTENT REACTIVE AIRWAY DISEASE WITHOUT COMPLICATION: ICD-10-CM

## 2018-04-02 DIAGNOSIS — E66.01 OBESITY, CLASS III, BMI 40-49.9 (MORBID OBESITY) (H): ICD-10-CM

## 2018-04-02 PROCEDURE — 86480 TB TEST CELL IMMUN MEASURE: CPT | Performed by: INTERNAL MEDICINE

## 2018-04-02 PROCEDURE — 99213 OFFICE O/P EST LOW 20 MIN: CPT | Mod: 25 | Performed by: INTERNAL MEDICINE

## 2018-04-02 PROCEDURE — 36415 COLL VENOUS BLD VENIPUNCTURE: CPT | Performed by: INTERNAL MEDICINE

## 2018-04-02 PROCEDURE — 99395 PREV VISIT EST AGE 18-39: CPT | Performed by: INTERNAL MEDICINE

## 2018-04-02 RX ORDER — PHENTERMINE HYDROCHLORIDE 15 MG/1
15 CAPSULE ORAL EVERY MORNING
Qty: 30 CAPSULE | Refills: 5 | Status: SHIPPED | OUTPATIENT
Start: 2018-04-02 | End: 2019-12-09

## 2018-04-02 RX ORDER — TOPIRAMATE 50 MG/1
TABLET, FILM COATED ORAL
Qty: 30 TABLET | Refills: 2 | Status: SHIPPED | OUTPATIENT
Start: 2018-04-02 | End: 2018-11-12

## 2018-04-02 NOTE — PROGRESS NOTES
SUBJECTIVE:   CC: Summer Smith is an 37 year old woman who presents for preventive health visit.     Healthy Habits:    Do you get at least three servings of calcium containing foods daily (dairy, green leafy vegetables, etc.)? no, taking calcium and/or vitamin D supplement: no    Amount of exercise or daily activities, outside of work: some not enough    Problems taking medications regularly No    Medication side effects: No    Have you had an eye exam in the past two years? yes    Do you see a dentist twice per year? yes    Do you have sleep apnea, excessive snoring or daytime drowsiness?no      Needs immunizations and would like blood TB test as she is on a time crunch for grad school and doesn't have time to do the 2 step Mantoux.    Obesity-    Tried phentermine for a few months and did lose some weight, but gained it all back since stopping med.  We had previously discussed phentermine-topiramate combo, and she is interested in trying that.     ACT Total Scores 1/4/2017 9/20/2017 4/2/2018   ACT TOTAL SCORE (Goal Greater than or Equal to 20) 23 24 13   In the past 12 months, how many times did you visit the emergency room for your asthma without being admitted to the hospital? 0 0 0   In the past 12 months, how many times were you hospitalized overnight because of your asthma? 0 0 0     ACT score is not very good right now because she has a URI with cough and SOB.  She states that prior to the URI her score would have been 25 and she hadn't used albuterol in months until recently.     Today's PHQ-2 Score:   PHQ-2 ( 1999 Pfizer) 4/2/2018 1/4/2017   Q1: Little interest or pleasure in doing things 0 0   Q2: Feeling down, depressed or hopeless 0 0   PHQ-2 Score 0 0       Abuse: Current or Past(Physical, Sexual or Emotional)- No  Do you feel safe in your environment - Yes    Social History   Substance Use Topics     Smoking status: Never Smoker     Smokeless tobacco: Never Used     Alcohol use Yes       Comment: rare     If you drink alcohol do you typically have >3 drinks per day or >7 drinks per week? No                     Reviewed orders with patient.  Reviewed health maintenance and updated orders accordingly - Yes  Patient Active Problem List   Diagnosis     Personal history of DVT (deep vein thrombosis)     Recurrent pulmonary emboli (H)     May-Thurner syndrome     Mild intermittent reactive airway disease without complication     Varicose veins of legs     Postoperative hemorrhage involving genitourinary system following genitourinary procedure     Morbid obesity due to excess calories (H)     History of hysterectomy     Pericardial cyst     Left leg swelling     SOB (shortness of breath)     History of pulmonary embolism     Past Surgical History:   Procedure Laterality Date     AS LIGATN SHORT SAPHKAMERON      had great saphenous vein removal     DAVINCI HYSTERECTOMY TOTAL      tubes removed, ovaries in place     STENT      venous in leg       Social History   Substance Use Topics     Smoking status: Never Smoker     Smokeless tobacco: Never Used     Alcohol use Yes      Comment: rare     Family History   Problem Relation Age of Onset     DIABETES Father      Hypertension Father      Hyperlipidemia Father      Colon Cancer Maternal Grandfather      Liver Cancer Maternal Grandfather      CANCER Paternal Grandfather      Thyroid Disease Mother      Thyroid Disease Sister      Thyroid Disease Sister      Endocrine Disease Daughter      Lucille, has appointment end of July 2016         Current Outpatient Prescriptions   Medication Sig Dispense Refill     phentermine 15 MG capsule Take 1 capsule (15 mg) by mouth every morning 30 capsule 5     topiramate (TOPAMAX) 50 MG tablet Take 1/2 tab daily for 2 weeks, then increase to full tab. 30 tablet 2     albuterol (PROAIR HFA/PROVENTIL HFA/VENTOLIN HFA) 108 (90 BASE) MCG/ACT Inhaler Inhale 2 puffs into the lungs every 4 hours as needed 1 Inhaler 3     Cetirizine HCl  "(ZYRTEC ALLERGY PO)        dabigatran ANTICOAGULANT (PRADAXA) 150 MG CAPS capsule Take 1 capsule (150 mg) by mouth 2 times daily Store in original 's bottle or blister pack; use within 120 days of opening. Do not crush or open capsule. 60 capsule      Cholecalciferol (VITAMIN D3) 3000 UNITS TABS Take 1 tablet by mouth daily        fluticasone (FLONASE) 50 MCG/ACT nasal spray Spray 1-2 sprays into both nostrils daily 16 g 0     order for DME Equipment being ordered: thigh high compression socks 25-35 mm Hg.  Please measure 1 each 11     Allergies   Allergen Reactions     Sulfa Drugs Hives     Vitamin K Anaphylaxis     Vit K IV only     Penicillin G Rash     Amoxicillin-Pot Clavulanate Rash       Mammogram not appropriate for this patient based on age.    Pertinent mammograms are reviewed under the imaging tab.  History of abnormal Pap smear: Status post benign hysterectomy. Health Maintenance and Surgical History updated.    Reviewed and updated as needed this visit by clinical staff  Tobacco  Allergies  Meds  Med Hx  Surg Hx  Fam Hx  Soc Hx        Reviewed and updated as needed this visit by Provider            ROS:    10 point ROS of systems including Constitutional, Eyes, Respiratory, Cardiovascular, Gastroenterology, Genitourinary, Integumentary, Muscularskeletal, Psychiatric were all negative except for pertinent positives noted in my HPI plus some lower leg edema that improves with compression stockings.     OBJECTIVE:   /83 (BP Location: Left arm, Patient Position: Chair, Cuff Size: Adult Large)  Pulse 65  Temp 98  F (36.7  C) (Tympanic)  Resp 16  Ht 5' 5.25\" (1.657 m)  Wt 280 lb (127 kg)  LMP 08/01/2016  BMI 46.24 kg/m2  EXAM:  GENERAL: healthy, alert and no distress  EYES: Eyes grossly normal to inspection, PERRL and conjunctivae and sclerae normal  HENT: ear canals and TM's normal, nose and mouth without ulcers or lesions  NECK: no adenopathy, no asymmetry, masses, or scars " and thyroid normal to palpation  RESP: lungs clear to auscultation - no rales, rhonchi or wheezes  BREAST: normal without masses, tenderness or nipple discharge and no palpable axillary masses or adenopathy  CV: regular rate and rhythm, normal S1 S2, no S3 or S4, no murmur, click or rub, no peripheral edema   ABDOMEN: soft, nontender, no hepatosplenomegaly, no masses and bowel sounds normal  MS: no gross musculoskeletal defects noted, no edema  SKIN: no suspicious lesions or rashes  NEURO: Normal strength and tone, mentation intact and speech normal  PSYCH: mentation appears normal, affect normal/bright    ASSESSMENT/PLAN:   1. Routine general medical examination at a health care facility      Pap smear no longer indicated    Immunizations: up to date    Lab Studies: cholesterol and glucose done last year      2. Screening examination for pulmonary tuberculosis    Needed for school.  Also provided print out of immunization history and letter reporting history of chicken pox.     - M Tuberculosis by [employer]    3. Obesity, Class III, BMI 40-49.9 (morbid obesity) (H)    Has some weight loss with phentermine, but gained weight back after discontinuing med.  Will try out phentermine-topiramate combo.  Follow-up in 3 months via [employer] message.      - phentermine 15 MG capsule; Take 1 capsule (15 mg) by mouth every morning  Dispense: 30 capsule; Refill: 5  - topiramate (TOPAMAX) 50 MG tablet; Take 1/2 tab daily for 2 weeks, then increase to full tab.  Dispense: 30 tablet; Refill: 2    4. Mild intermittent reactive airway disease without complication    Currently flared due to URI, but otherwise controlled.  Continue prn albuterol.       COUNSELING:   Reviewed preventive health counseling, as reflected in patient instructions         reports that she has never smoked. She has never used smokeless tobacco.    Estimated body mass index is 46.24 kg/(m^2) as calculated from the following:    Height as of this encounter: 5'  "5.25\" (1.657 m).    Weight as of this encounter: 280 lb (127 kg).   Weight management plan: see above      Kalyan Hogan MD  [facility]  "

## 2018-04-02 NOTE — LETTER
My Asthma Action Plan  Name: Summer Smith   YOB: 1980  Date: 4/2/2018   My doctor: Kalyan Hogan MD   My clinic: [facility]        My Control Medicine: None  My Rescue Medicine: albuterol as needed   My Asthma Severity: intermittent  Avoid your asthma triggers: smoke, upper respiratory infections and strong odors and fumes               GREEN ZONE   Good Control    I feel good    No cough or wheeze    Can work, sleep and play without asthma symptoms       Take your asthma control medicine every day.     1. If exercise triggers your asthma, take your rescue medication    15 minutes before exercise or sports, and    During exercise if you have asthma symptoms  2. Spacer to use with inhaler: If you have a spacer, make sure to use it with your inhaler             YELLOW ZONE Getting Worse  I have ANY of these:    I do not feel good    Cough or wheeze    Chest feels tight    Wake up at night   1. Keep taking your Green Zone medications  2. Start taking your rescue medicine:    every 20 minutes for up to 1 hour. Then every 4 hours for 24-48 hours.  3. If you stay in the Yellow Zone for more than 12-24 hours, contact your doctor.  4. If you do not return to the Green Zone in 12-24 hours or you get worse, start taking your oral steroid medicine if prescribed by your provider.           RED ZONE Medical Alert - Get Help  I have ANY of these:    I feel awful    Medicine is not helping    Breathing getting harder    Trouble walking or talking    Nose opens wide to breathe       1. Take your rescue medicine NOW  2. If your provider has prescribed an oral steroid medicine, start taking it NOW  3. Call your doctor NOW  4. If you are still in the Red Zone after 20 minutes and you have not reached your doctor:    Take your rescue medicine again and    Call 911 or go to the emergency room right away    See your regular doctor within 2 weeks of an Emergency Room or Urgent Care visit for follow-up  treatment.          Annual Reminders:  Meet with Asthma Educator,  Flu Shot in the Fall, consider Pneumonia Vaccination for patients with asthma (aged 19 and older).    Pharmacy: [id number] [place] [address]                      Asthma Triggers  How To Control Things That Make Your Asthma Worse    Triggers are things that make your asthma worse.  Look at the list below to help you find your triggers and what you can do about them.  You can help prevent asthma flare-ups by staying away from your triggers.      Trigger                                                          What you can do   Cigarette Smoke  Tobacco smoke can make asthma worse. Do not allow smoking in your home, car or around you.  Be sure no one smokes at a child s day care or school.  If you smoke, ask your health care provider for ways to help you quit.  Ask family members to quit too.  Ask your health care provider for a referral to Quit Plan to help you quit smoking, or call 7-918-640-PLAN.     Colds, Flu, Bronchitis  These are common triggers of asthma. Wash your hands often.  Don t touch your eyes, nose or mouth.  Get a flu shot every year.     Dust Mites  These are tiny bugs that live in cloth or carpet. They are too small to see. Wash sheets and blankets in hot water every week.   Encase pillows and mattress in dust mite proof covers.  Avoid having carpet if you can. If you have carpet, vacuum weekly.   Use a dust mask and HEPA vacuum.   Pollen and Outdoor Mold  Some people are allergic to trees, grass, or weed pollen, or molds. Try to keep your windows closed.  Limit time out doors when pollen count is high.   Ask you health care provider about taking medicine during allergy season.     Animal Dander  Some people are allergic to skin flakes, urine or saliva from pets with fur or feathers. Keep pets with fur or feathers out of your home.    If you can t keep the pet outdoors, then keep the pet out of your  bedroom.  Keep the bedroom door closed.  Keep pets off cloth furniture and away from stuffed toys.     Mice, Rats, and Cockroaches  Some people are allergic to the waste from these pests.   Cover food and garbage.  Clean up spills and food crumbs.  Store grease in the refrigerator.   Keep food out of the bedroom.   Indoor Mold  This can be a trigger if your home has high moisture. Fix leaking faucets, pipes, or other sources of water.   Clean moldy surfaces.  Dehumidify basement if it is damp and smelly.   Smoke, Strong Odors, and Sprays  These can reduce air quality. Stay away from strong odors and sprays, such as perfume, powder, hair spray, paints, smoke incense, paint, cleaning products, candles and new carpet.   Exercise or Sports  Some people with asthma have this trigger. Be active!  Ask your doctor about taking medicine before sports or exercise to prevent symptoms.    Warm up for 5-10 minutes before and after sports or exercise.     Other Triggers of Asthma  Cold air:  Cover your nose and mouth with a scarf.  Sometimes laughing or crying can be a trigger.  Some medicines and food can trigger asthma.

## 2018-04-02 NOTE — LETTER
[facility]  [address] [address]  Phone: [phone]    April 2, 2018        Summer Smith  [address]  [place] [address]          To whom it may concern:    RE: Summer Smith    Patient was seen and treated today at our clinic.  She had chicken pox in 1986.     Please contact me for questions or concerns.      Sincerely,        Kalyan Hogan MD

## 2018-04-02 NOTE — NURSING NOTE
"Chief Complaint   Patient presents with     Physical       Initial /83 (BP Location: Left arm, Patient Position: Chair, Cuff Size: Adult Large)  Pulse 65  Temp 98  F (36.7  C) (Tympanic)  Resp 16  Ht 5' 5.25\" (1.657 m)  Wt 280 lb (127 kg)  LMP 08/01/2016  BMI 46.24 kg/m2 Estimated body mass index is 46.24 kg/(m^2) as calculated from the following:    Height as of this encounter: 5' 5.25\" (1.657 m).    Weight as of this encounter: 280 lb (127 kg).  Medication Reconciliation: complete  "

## 2018-04-02 NOTE — MR AVS SNAPSHOT
After Visit Summary   4/2/2018    Summer Smtih    MRN: [id number]           Patient Information     Date Of Birth          1980        Visit Information        Provider Department      4/2/2018 10:00 AM Kalyan Hogan MD [facility]        Today's Diagnoses     Screening examination for pulmonary tuberculosis    -  1    Obesity, Class III, BMI 40-49.9 (morbid obesity) (H)          Care Instructions    Send me a MyChart message in 3 months to let me know how the weight loss is going.     CALCIUM    Recommendations:  Teenagers and premenopausal women: 1200 mg/day  Pregnant and Lactating women: 1500 mg/day  Men and Postmenopausal women on estrogen: 1200mg/day  Postmenopausal women not on estrogen: 1500 mg/day    If you are not eating dairy products you also need 400 IU of vitamin D per day which can be obtained in either a multivitamin or in some of the Calcium tablets.    Dietary sources: These also contain vitamin D  Milk                            8 oz            300 mg  Yogurt                          1 cup           400 mg  Hard cheese                     1.5 oz          300 mg  Cottage cheese                  2 cup           300 mg  Orange juice with Calcium       8 oz            300 mg  Low fat dairy sources are recommended    Supplements:  Tums EX                         300 mg  Tums Ultra                      400 mg  Caltrate 600                    600 mg  Oscal                           500 mg  Oscal/D                         500 mg plus vitamin D  Women's Formula Multivitamin    450 mg    Preventive Health Recommendations  Female Ages 26 - 39  Yearly exam:   See your health care provider every year in order to    Review health changes.     Discuss preventive care.      Review your medicines if you your doctor has prescribed any.    Until age 30: Get a Pap test every three years (more often if you have had an abnormal result).    After age 30: Talk to your doctor about  whether you should have a Pap test every 3 years or have a Pap test with HPV screening every 5 years.   You do not need a Pap test if your uterus was removed (hysterectomy) and you have not had cancer.  You should be tested each year for STDs (sexually transmitted diseases), if you're at risk.   Talk to your provider about how often to have your cholesterol checked.  If you are at risk for diabetes, you should have a diabetes test (fasting glucose).  Shots: Get a flu shot each year. Get a tetanus shot every 10 years.   Nutrition:     Eat at least 5 servings of fruits and vegetables each day.    Eat whole-grain bread, whole-wheat pasta and brown rice instead of white grains and rice.    Talk to your provider about Calcium and Vitamin D.     Lifestyle    Exercise at least 150 minutes a week (30 minutes a day, 5 days of the week). This will help you control your weight and prevent disease.    Limit alcohol to one drink per day.    No smoking.     Wear sunscreen to prevent skin cancer.    See your dentist every six months for an exam and cleaning.            Follow-ups after your visit        Who to contact     If you have questions or need follow up information about today's clinic visit or your schedule please contact [facility] directly at [phone].  Normal or non-critical lab and imaging results will be communicated to you by [employer], letter or phone within 4 business days after the clinic has received the results. If you do not hear from us within 7 days, please contact the clinic through [employer] or phone. If you have a critical or abnormal lab result, we will notify you by phone as soon as possible.  Submit refill requests through myOrder or call your pharmacy and they will forward the refill request to us. Please allow 3 business days for your refill to be completed.          Additional Information About Your Visit        myOrder Information     myOrder gives you secure access to your electronic  "health record. If you see a primary care provider, you can also send messages to your care team and make appointments. If you have questions, please call your primary care clinic.  If you do not have a primary care provider, please call [phone] and they will assist you.        Care EveryWhere ID     This is your Care EveryWhere ID. This could be used by other organizations to access your [facility] medical records  [id number]        Your Vitals Were     Pulse Temperature Respirations Height Last Period BMI (Body Mass Index)    65 98  F (36.7  C) (Tympanic) 16 5' 5.25\" (1.657 m) 08/01/2016 46.24 kg/m2       Blood Pressure from Last 3 Encounters:   04/02/18 138/83   02/08/18 128/88   11/22/17 (!) 148/92    Weight from Last 3 Encounters:   04/02/18 280 lb (127 kg)   02/08/18 274 lb (124.3 kg)   11/22/17 275 lb 12.8 oz (125.1 kg)              We Performed the Following     M Tuberculosis by Quantiferon          Today's Medication Changes          These changes are accurate as of 4/2/18 10:37 AM.  If you have any questions, ask your nurse or doctor.               Start taking these medicines.        Dose/Directions    topiramate 50 MG tablet   Commonly known as:  TOPAMAX   Used for:  Obesity, Class III, BMI 40-49.9 (morbid obesity) (H)   Started by:  Kalyan Hogan MD        Take 1/2 tab daily for 2 weeks, then increase to full tab.   Quantity:  30 tablet   Refills:  2            Where to get your medicines      These medications were sent to [place] [address] [place] [address] [address]     Phone:  [phone]     topiramate 50 MG tablet         Some of these will need a paper prescription and others can be bought over the counter.  Ask your nurse if you have questions.     Bring a paper prescription for each of these medications     phentermine 15 MG capsule                Primary Care Provider Office Phone # Fax #    Kalyan Hogan [phone] " [phone]       [address] [facility] [address] [address]        Equal Access to Services     RENETTA FARRAR : Hadii aad ku hadkarisummer Soalida, waaxda luqadaha, qaybta kaalmada osminsummercliff, jackie song bassamludwin peoplesdaisysaroj royal. So [facility] [phone].    ATENCIÓN: Si habla español, tiene a ames disposición servicios gratuitos de asistencia lingüística. Llame al [phone].    We comply with applicable federal civil rights laws and [place] laws. We do not discriminate on the basis of race, color, national origin, age, disability, sex, sexual orientation, or gender identity.            Thank you!     Thank you for choosing [facility]  for your care. Our goal is always to provide you with excellent care. Hearing back from our patients is one way we can continue to improve our services. Please take a few minutes to complete the written survey that you may receive in the mail after your visit with us. Thank you!             Your Updated Medication List - Protect others around you: Learn how to safely use, store and throw away your medicines at www.disposemymeds.org.          This list is accurate as of 4/2/18 10:37 AM.  Always use your most recent med list.                   Brand Name Dispense Instructions for use Diagnosis    albuterol 108 (90 BASE) MCG/ACT Inhaler    PROAIR HFA/PROVENTIL HFA/VENTOLIN HFA    1 Inhaler    Inhale 2 puffs into the lungs every 4 hours as needed    Mild intermittent reactive airway disease without complication       dabigatran ANTICOAGULANT 150 MG capsule    PRADAXA    60 capsule    Take 1 capsule (150 mg) by mouth 2 times daily Store in original 's bottle or blister pack; use within 120 days of opening. Do not crush or open capsule.        fluticasone 50 MCG/ACT spray    FLONASE    16 g    Spray 1-2 sprays into both nostrils daily    Post-nasal drainage       order for DME     1 each    Equipment being ordered: thigh high compression socks 25-35 mm Hg.  Please  measure    Personal history of DVT (deep vein thrombosis)       phentermine 15 MG capsule     30 capsule    Take 1 capsule (15 mg) by mouth every morning    Obesity, Class III, BMI 40-49.9 (morbid obesity) (H)       topiramate 50 MG tablet    TOPAMAX    30 tablet    Take 1/2 tab daily for 2 weeks, then increase to full tab.    Obesity, Class III, BMI 40-49.9 (morbid obesity) (H)       Vitamin D3 3000 UNITS Tabs      Take 1 tablet by mouth daily        ZYRTEC ALLERGY PO

## 2018-04-02 NOTE — PATIENT INSTRUCTIONS
Send me a Jike Xueyuan message in 3 months to let me know how the weight loss is going.     CALCIUM    Recommendations:  Teenagers and premenopausal women: 1200 mg/day  Pregnant and Lactating women: 1500 mg/day  Men and Postmenopausal women on estrogen: 1200mg/day  Postmenopausal women not on estrogen: 1500 mg/day    If you are not eating dairy products you also need 400 IU of vitamin D per day which can be obtained in either a multivitamin or in some of the Calcium tablets.    Dietary sources: These also contain vitamin D  Milk                            8 oz            300 mg  Yogurt                          1 cup           400 mg  Hard cheese                     1.5 oz          300 mg  Cottage cheese                  2 cup           300 mg  Orange juice with Calcium       8 oz            300 mg  Low fat dairy sources are recommended    Supplements:  Tums EX                         300 mg  Tums Ultra                      400 mg  Caltrate 600                    600 mg  Oscal                           500 mg  Oscal/D                         500 mg plus vitamin D  Women's Formula Multivitamin    450 mg    Preventive Health Recommendations  Female Ages 26 - 39  Yearly exam:   See your health care provider every year in order to    Review health changes.     Discuss preventive care.      Review your medicines if you your doctor has prescribed any.    Until age 30: Get a Pap test every three years (more often if you have had an abnormal result).    After age 30: Talk to your doctor about whether you should have a Pap test every 3 years or have a Pap test with HPV screening every 5 years.   You do not need a Pap test if your uterus was removed (hysterectomy) and you have not had cancer.  You should be tested each year for STDs (sexually transmitted diseases), if you're at risk.   Talk to your provider about how often to have your cholesterol checked.  If you are at risk for diabetes, you should have a diabetes test (fasting  glucose).  Shots: Get a flu shot each year. Get a tetanus shot every 10 years.   Nutrition:     Eat at least 5 servings of fruits and vegetables each day.    Eat whole-grain bread, whole-wheat pasta and brown rice instead of white grains and rice.    Talk to your provider about Calcium and Vitamin D.     Lifestyle    Exercise at least 150 minutes a week (30 minutes a day, 5 days of the week). This will help you control your weight and prevent disease.    Limit alcohol to one drink per day.    No smoking.     Wear sunscreen to prevent skin cancer.    See your dentist every six months for an exam and cleaning.

## 2018-04-03 LAB
M TB TUBERC IFN-G BLD QL: NEGATIVE
M TB TUBERC IFN-G/MITOGEN IGNF BLD: 0 IU/ML

## 2018-04-03 ASSESSMENT — ASTHMA QUESTIONNAIRES: ACT_TOTALSCORE: 13

## 2018-05-08 ENCOUNTER — APPOINTMENT (OUTPATIENT)
Dept: ULTRASOUND IMAGING | Facility: CLINIC | Age: 38
End: 2018-05-08
Attending: PHYSICIAN ASSISTANT
Payer: COMMERCIAL

## 2018-05-08 ENCOUNTER — HOSPITAL ENCOUNTER (EMERGENCY)
Facility: CLINIC | Age: 38
Discharge: HOME | End: 2018-05-08
Attending: PHYSICIAN ASSISTANT | Admitting: PHYSICIAN ASSISTANT
Payer: COMMERCIAL

## 2018-05-08 ENCOUNTER — APPOINTMENT (OUTPATIENT)
Dept: CT IMAGING | Facility: CLINIC | Age: 38
End: 2018-05-08
Attending: PHYSICIAN ASSISTANT
Payer: COMMERCIAL

## 2018-05-08 VITALS
SYSTOLIC BLOOD PRESSURE: 153 MMHG | TEMPERATURE: 98.8 F | BODY MASS INDEX: 44.68 KG/M2 | WEIGHT: 278 LBS | DIASTOLIC BLOOD PRESSURE: 115 MMHG | OXYGEN SATURATION: 97 % | RESPIRATION RATE: 18 BRPM | HEIGHT: 66 IN

## 2018-05-08 DIAGNOSIS — M79.89 LEFT LEG SWELLING: ICD-10-CM

## 2018-05-08 DIAGNOSIS — M54.50 ACUTE LEFT-SIDED LOW BACK PAIN WITHOUT SCIATICA: ICD-10-CM

## 2018-05-08 DIAGNOSIS — I87.1 MAY-THURNER SYNDROME: ICD-10-CM

## 2018-05-08 PROCEDURE — G0463 HOSPITAL OUTPT CLINIC VISIT: HCPCS | Mod: 25 | Performed by: PHYSICIAN ASSISTANT

## 2018-05-08 PROCEDURE — 99215 OFFICE O/P EST HI 40 MIN: CPT | Mod: Z6 | Performed by: PHYSICIAN ASSISTANT

## 2018-05-08 PROCEDURE — 93971 EXTREMITY STUDY: CPT | Mod: LT

## 2018-05-08 PROCEDURE — 25000132 ZZH RX MED GY IP 250 OP 250 PS 637: Performed by: PHYSICIAN ASSISTANT

## 2018-05-08 PROCEDURE — 25000125 ZZHC RX 250: Performed by: PHYSICIAN ASSISTANT

## 2018-05-08 PROCEDURE — 25000128 H RX IP 250 OP 636: Performed by: PHYSICIAN ASSISTANT

## 2018-05-08 PROCEDURE — 74177 CT ABD & PELVIS W/CONTRAST: CPT

## 2018-05-08 RX ORDER — ACETAMINOPHEN 325 MG/1
975 TABLET ORAL ONCE
Status: COMPLETED | OUTPATIENT
Start: 2018-05-08 | End: 2018-05-08

## 2018-05-08 RX ORDER — HYDROCODONE BITARTRATE AND ACETAMINOPHEN 5; 325 MG/1; MG/1
1 TABLET ORAL EVERY 6 HOURS PRN
Qty: 9 TABLET | Refills: 0 | Status: SHIPPED | OUTPATIENT
Start: 2018-05-08 | End: 2018-11-12

## 2018-05-08 RX ORDER — IOPAMIDOL 755 MG/ML
100 INJECTION, SOLUTION INTRAVASCULAR ONCE
Status: COMPLETED | OUTPATIENT
Start: 2018-05-08 | End: 2018-05-08

## 2018-05-08 RX ADMIN — ACETAMINOPHEN 975 MG: 325 TABLET, FILM COATED ORAL at 16:47

## 2018-05-08 RX ADMIN — SODIUM CHLORIDE 78 ML: 9 INJECTION, SOLUTION INTRAVENOUS at 17:13

## 2018-05-08 RX ADMIN — IOPAMIDOL 100 ML: 755 INJECTION, SOLUTION INTRAVENOUS at 17:13

## 2018-05-08 ASSESSMENT — ENCOUNTER SYMPTOMS
BACK PAIN: 1
WOUND: 0
COLOR CHANGE: 0

## 2018-05-08 NOTE — ED AVS SNAPSHOT
[facility]    [address] [address]    Phone:  [phone]    Fax:  [phone]                                       Summer Smith   MRN: [id number]    Department:  [facility]   Date of Visit:  5/8/2018           After Visit Summary Signature Page     I have received my discharge instructions, and my questions have been answered. I have discussed any challenges I see with this plan with the nurse or doctor.    ..........................................................................................................................................  Patient/Patient Representative Signature      ..........................................................................................................................................  Patient Representative Print Name and Relationship to Patient    ..................................................               ................................................  Date                                            Time    ..........................................................................................................................................  Reviewed by Signature/Title    ...................................................              ..............................................  Date                                                            Time

## 2018-05-08 NOTE — DISCHARGE INSTRUCTIONS
Heat to back, ice, rest, use medication as directed; caution can cause sedation.     Patient to follow up with primary care provider in next 1-2 days  Vascular/IR referral given to patient for further evaluation.     Continue to wear cassandra stockings as directed.     Return if shortness of breath, chest pain, redness to leg, worsening numbness/tingling or swelling.

## 2018-05-08 NOTE — ED PROVIDER NOTES
History     Chief Complaint   Patient presents with     Leg Swelling     pt sent from clinic due to swelling in left leg with low back pain.      THERON Smith is a 37 year old female who was sent from primary care for concerns of DVT in left lower extremity and left lower back pain. Patient has May Thurner syndrome with stent placed in left iliac vein at [facility] in 2015. Patient also has history of PE time 2 and DVT times 2 with a DVT found in inferior vena cava once when interventional radiology was checking patency of her iliac vein stent. Patient states they attempted to place an inferior vena cava filter, but she kept having tachycardia, so this was never placed. Patient states she has history of lower extremity edema normally, but usually it improves over night, but for the past 1.5-2 weeks the left lower leg swelling has not improved at night and then yesterday she started noticing calf pain and thigh pain in the left lower leg. She has also had some decreased sensation to the dorsum of the left distal foot and toes starting yesterday. Patient denies redness, fevers, bruising, discoloration to the extremity, palpable cord, fevers, and no recent injury or over use.  Patient has also been noticing left lower back point tenderness with exhalation and pinpoint palpation, but does not occur with twisting or bending. Patient denies urinary symptoms, loss of bowel or bladder function, weakness in legs, saddle anesthesia, abdominal pain, nausea/vomiting, vertebral spinal pain, CVA tenderness, hematuria. Patient is on blood thinners, but they do not require checking of INR. Patient states she has tried both tylenol and ibuprofen for the pain with minimal to no relief. Patient does wear cassandra stockings daily, but did not today.       Problem List:    Patient Active Problem List    Diagnosis Date Noted     Left leg swelling 01/03/2017     Priority: Medium     SOB (shortness of breath) 01/03/2017      Priority: Medium     History of pulmonary embolism 01/03/2017     Priority: Medium     Postoperative hemorrhage involving genitourinary system following genitourinary procedure 12/27/2016     Priority: Medium     Significant vaginal bleeding after hysterectomy, requiring hospitalization at [facility].       Morbid obesity due to excess calories (H) 12/27/2016     Priority: Medium     History of hysterectomy 12/27/2016     Priority: Medium     Pericardial cyst 12/27/2016     Priority: Medium     Mild intermittent reactive airway disease without complication 09/17/2015     Priority: Medium     Abnormal PFT 2012, started on maintenance inhaler - [facility]  Symptoms with URIs       Varicose veins of legs 09/17/2015     Priority: Medium     Laser surgery left leg 2012 and left iliac vein stent placement 2012       Personal history of DVT (deep vein thrombosis) 08/26/2015     Priority: Medium     In 2007. Due to May-Thurner syndrome and being on OCP.  On warfarin for 6 months.  With pregnancies she did heparin injections followed by post-partum warfarin.       Recurrent pulmonary emboli (H) 08/26/2015     Priority: Medium     2007 12/2016 after hysterectomy, complicated by vaginal bleeding, needing to be off anti-coagulation.  Following with Dr. White at [facility].  Recommends indefinite anticoagulation.        May-Thurner syndrome 08/26/2015     Priority: Medium     History of vein stenting in iliac/femoral area.          Past Medical History:    Past Medical History:   Diagnosis Date     DVT (deep vein thrombosis) in pregnancy (H) 2007     Pulmonary embolism (H) 2007       Past Surgical History:    Past Surgical History:   Procedure Laterality Date     AS LIGATN SHORT SAPHEN      had great saphenous vein removal     DAVINCI HYSTERECTOMY TOTAL      tubes removed, ovaries in place     STENT      venous in leg       Family History:    Family History   Problem Relation Age of Onset     DIABETES Father       "Hypertension Father      Hyperlipidemia Father      Colon Cancer Maternal Grandfather      Liver Cancer Maternal Grandfather      CANCER Paternal Grandfather      Thyroid Disease Mother      Thyroid Disease Sister      Thyroid Disease Sister      Endocrine Disease Daughter      Lucille, has appointment end of July 2016       Social History:  Marital Status:   [2]  Social History   Substance Use Topics     Smoking status: Never Smoker     Smokeless tobacco: Never Used     Alcohol use Yes      Comment: rare        Medications:      HYDROcodone-acetaminophen (NORCO) 5-325 MG per tablet   albuterol (PROAIR HFA/PROVENTIL HFA/VENTOLIN HFA) 108 (90 BASE) MCG/ACT Inhaler   Cetirizine HCl (ZYRTEC ALLERGY PO)   Cholecalciferol (VITAMIN D3) 3000 UNITS TABS   dabigatran ANTICOAGULANT (PRADAXA) 150 MG CAPS capsule   fluticasone (FLONASE) 50 MCG/ACT nasal spray   order for DME   phentermine 15 MG capsule   topiramate (TOPAMAX) 50 MG tablet         Review of Systems   Musculoskeletal: Positive for back pain.        Left lower leg pain and swelling.    Skin: Negative for color change, pallor, rash and wound.   All other systems reviewed and are negative.      Physical Exam   BP: (!) 153/115  Heart Rate: 88  Temp: 98.8  F (37.1  C)  Resp: 18  Height: 167.6 cm (5' 6\")  Weight: 126.1 kg (278 lb)  SpO2: 97 %      Physical Exam   Constitutional: She is oriented to person, place, and time. She appears well-developed and well-nourished. No distress.   Cardiovascular: Normal rate, regular rhythm, normal heart sounds and intact distal pulses.  Exam reveals no gallop and no friction rub.    No murmur heard.  Pulmonary/Chest: Effort normal and breath sounds normal. No respiratory distress. She has no wheezes. She has no rales. She exhibits no tenderness.   Abdominal: Soft. Bowel sounds are normal. She exhibits no distension and no mass. There is no tenderness. There is no rebound and no guarding.   Musculoskeletal:        Arms:       " Left lower leg: She exhibits tenderness, swelling and edema (no pitting edema ). She exhibits no bony tenderness, no deformity and no laceration.   No CVA tenderness bilaterally. No pain with twisting to the left, minimal pain at that site with twisting to the right, but no consistent. No pain with flexion or extension of the back. No joint pain to knee, hip, or ankle on left lower extremity.    Neurological: She is alert and oriented to person, place, and time. She has normal strength and normal reflexes. No sensory deficit. Gait normal. GCS eye subscore is 4. GCS verbal subscore is 5. GCS motor subscore is 6.   Reflex Scores:       Patellar reflexes are 2+ on the right side and 2+ on the left side.  Skin: Skin is warm, dry and intact. No bruising, no ecchymosis and no rash noted. She is not diaphoretic. No erythema. No pallor.   Psychiatric: She has a normal mood and affect. Her behavior is normal. Judgment and thought content normal.       ED Course     ED Course     Procedures              Critical Care time:  none               Results for orders placed or performed during the hospital encounter of 05/08/18 (from the past 24 hour(s))   US Lower Extremity Venous Duplex Left    Narrative    US LOWER EXTREMITY VENOUS DUPLEX LEFT 5/8/2018 4:12 PM    HISTORY: Left lower extremity swelling and calf pain.    TECHNIQUE: Color flow and doppler spectral waveform analysis of deep  venous structures is performed.  Imaged deep venous structures of the  left lower extremity include the common femoral vein, femoral vein,  popliteal vein, and visualized posterior deep calf veins.    COMPARISON: 4/8/2016.    FINDINGS: No DVT is demonstrated. Additional sonography to the areas  of pain which include the anterior mid thigh and mid calf, show no  focal finding.      Impression    IMPRESSION: Negative.     JULEE CALIX MD   CT Abdomen Pelvis w Contrast    Narrative    CT ABDOMEN AND PELVIS WITH CONTRAST 5/8/2018 5:45 PM      HISTORY: Patient with May-Thurner syndrome and stent in left iliac  vein. Patient here for left lower extremity pain and swelling and left  lower back pain. Checking patency of iliac stent.    TECHNIQUE: Volumetric acquisition through abdomen and pelvis with 100  mL Isovue-370 IV contrast. Radiation dose for this scan was reduced  using automated exposure control, adjustment of the mA and/or kV  according to patient size, or iterative reconstruction technique.    COMPARISON: 11/15/2016.    FINDINGS: Fatty infiltration of the liver. Gallbladder, spleen,  pancreas, adrenal glands and kidneys are negative. No hydronephrosis.    Left iliac vein stent. Symmetric enhancement within the stent and no  filling defects seen within the stent to suggest thrombus. Mild aortic  calcification.    Hysterectomy. No suspicious pelvic masses. No bowel obstruction,  ascites or other acute findings.      Impression    IMPRESSION:  1. No acute abnormality.  2. Fatty infiltration of the liver.   3. Stable appearance of left iliac vein stent.    DASH RAMEY MD       Tylenol 975mg given orally in office for pain.     Assessments & Plan (with Medical Decision Making)     I have reviewed the nursing notes.    I have reviewed the findings, diagnosis, plan and need for follow up with the patient.  Called Vascular at [facility] and spoke to Dr. Santos who stated if negative venous doppler patient to have CT venogram to make sure iliac stent is patent and then could follow up with interventional radiology or vascular for further follow up and management since patient has not been managed for several years.     Prior to ordering CT scan I called CT tech who spoke with Dr. Walker regarding patient a CT scan of abdomen and pelvis obtained with IV contrast at different radiation doses to see both arterial and venous system.     I discussed case with Dr. Tobar in Emergency Room prior to discharge who agreed with plan and no further testing at  this time in the urgent care.     Heat to back, ice, rest, use medication as directed; caution can cause sedation.     Patient to follow up with primary care provider in next 1-2 days  Vascular/IR referral given to patient for further evaluation.     Continue to wear cassandra stockings as directed.     Return if shortness of breath, chest pain, redness to leg, worsening numbness/tingling or swelling.     Discharge Medication List as of 5/8/2018  6:12 PM      START taking these medications    Details   HYDROcodone-acetaminophen (NORCO) 5-325 MG per tablet Take 1 tablet by mouth every 6 hours as needed for pain, Disp-9 tablet, R-0, Local Print             Final diagnoses:   Left leg swelling   May-Thurner syndrome   Acute left-sided low back pain without sciatica       5/8/2018   [facility] EMERGENCY DEPARTMENT     Yvrose Stinson PA-C  05/08/18 [id number]

## 2018-05-08 NOTE — ED AVS SNAPSHOT
[facility] Emergency Department    [address] [address]    Phone:  [phone]    Fax:  [phone]                                       Summer Smith   MRN: [id number]    Department:  [facility] Emergency Department   Date of Visit:  5/8/2018           Patient Information     Date Of Birth          1980        Your diagnoses for this visit were:     Left leg swelling     May-Thurner syndrome     Acute left-sided low back pain without sciatica        You were seen by Yvrose Stinson PA-C.      Follow-up Information     Follow up with Kalyan Hogan MD In 1 day.    Specialty:  Internal Medicine    Contact information:    [address] [facility] [id number] [phone]          Discharge Instructions       Heat to back, ice, rest, use medication as directed; caution can cause sedation.     Patient to follow up with primary care provider in next 1-2 days  Vascular/IR referral given to patient for further evaluation.     Continue to wear cassandra stockings as directed.     Return if shortness of breath, chest pain, redness to leg, worsening numbness/tingling or swelling.     24 Hour Appointment Hotline       To make an appointment at any [facility] clinic, call [phone] ([phone]). If you don't have a family doctor or clinic, we will help you find one. [facility] clinics are conveniently located to serve the needs of you and your family.          ED Discharge Orders     VASCULAR REFERRAL       Your provider has referred you to the [facility] at [facility].    Reason for Referral: Cardiovascular Medicine    Urgency of Referral: within the week.     A referral has been sent to our Vascular  Services. If you do not receive a call from them within 24-48 hours you may reach them at [phone].    Please be aware that coverage of these services is subject to the terms and limitations of your health insurance plan.  Call member services at your  health plan with any benefit or coverage questions.      Please bring the following with you to your appointment:  (1) Any X-Rays, CTs or MRIs which have been performed.  Contact the facility where they were done to arrange for  prior to your scheduled appointment.  Any new CT, MRI or other procedures ordered by your specialist must be performed at a [place] facility or coordinated by your clinic's referral office.    (2) List of current medications   (3) This referral request   (4) Any documents/labs given to you for this referral                     Review of your medicines      START taking        Dose / Directions Last dose taken    HYDROcodone-acetaminophen 5-325 MG per tablet   Commonly known as:  NORCO   Dose:  1 tablet   Quantity:  9 tablet        Take 1 tablet by mouth every 6 hours as needed for pain   Refills:  0          Our records show that you are taking the medicines listed below. If these are incorrect, please call your family doctor or clinic.        Dose / Directions Last dose taken    albuterol 108 (90 Base) MCG/ACT Inhaler   Commonly known as:  PROAIR HFA/PROVENTIL HFA/VENTOLIN HFA   Dose:  2 puff   Quantity:  1 Inhaler        Inhale 2 puffs into the lungs every 4 hours as needed   Refills:  3        dabigatran ANTICOAGULANT 150 MG capsule   Commonly known as:  PRADAXA   Dose:  150 mg   Quantity:  60 capsule        Take 1 capsule (150 mg) by mouth 2 times daily Store in original 's bottle or blister pack; use within 120 days of opening. Do not crush or open capsule.   Refills:  0        fluticasone 50 MCG/ACT spray   Commonly known as:  FLONASE   Dose:  1-2 spray   Quantity:  16 g        Spray 1-2 sprays into both nostrils daily   Refills:  0        order for DME   Quantity:  1 each        Equipment being ordered: thigh high compression socks 25-35 mm Hg.  Please measure   Refills:  11        phentermine 15 MG capsule   Dose:  15 mg   Quantity:  30 capsule        Take 1  capsule (15 mg) by mouth every morning   Refills:  5        topiramate 50 MG tablet   Commonly known as:  TOPAMAX   Quantity:  30 tablet        Take 1/2 tab daily for 2 weeks, then increase to full tab.   Refills:  2        Vitamin D3 3000 units Tabs   Dose:  1 tablet   Indication:  2000 units        Take 1 tablet by mouth daily   Refills:  0        ZYRTEC ALLERGY PO        Refills:  0                Information about OPIOIDS     PRESCRIPTION OPIOIDS: WHAT YOU NEED TO KNOW   You have a prescription for an opioid (narcotic) pain medicine. Opioids can cause addiction. If you have a history of chemical dependency of any type, you are at a higher risk of becoming addicted to opioids. Only take this medicine after all other options have been tried. Take it for as short a time and as few doses as possible.     Do not:    Drive. If you drive while taking these medicines, you could be arrested for driving under the influence (DUI).    Operate heavy machinery    Do any other dangerous activities while taking these medicines.     Drink any alcohol while taking these medicines.      Take with any other medicines that contain acetaminophen. Read all labels carefully. Look for the word  acetaminophen  or  Tylenol.  Ask your pharmacist if you have questions or are unsure.    Store your pills in a secure place, locked if possible. We will not replace any lost or stolen medicine. If you don t finish your medicine, please throw away (dispose) as directed by your pharmacist. The [place] [employer]ution [employer] has more information about safe disposal: https://www.pca.[facility].mn.us/living-green/managing-unwanted-medications    All opioids tend to cause constipation. Drink plenty of water and eat foods that have a lot of fiber, such as fruits, vegetables, prune juice, apple juice and high-fiber cereal. Take a laxative (Miralax, milk of magnesia, Colace, Senna) if you don t move your bowels at least every other day.          Prescriptions were sent or printed at these locations (1 Prescription)                   Other Prescriptions                Printed at Department/Unit printer (1 of 1)         HYDROcodone-acetaminophen (NORCO) 5-325 MG per tablet                Procedures and tests performed during your visit     CT Abdomen Pelvis w Contrast    US Lower Extremity Venous Duplex Left      Orders Needing Specimen Collection     None      Pending Results     Date and Time Order Name Status Description    5/8/2018 1642 CT Abdomen Pelvis w Contrast Preliminary             Pending Culture Results     No orders found from 5/6/2018 to 5/9/2018.            Pending Results Instructions     If you had any lab results that were not finalized at the time of your Discharge, you can call the ED Lab Result RN at [phone]. You will be contacted by this team for any positive Lab results or changes in treatment. The nurses are available 7 days a week from 10A to 6:30P.  You can leave a message 24 hours per day and they will return your call.        Test Results From Your Hospital Stay        5/8/2018  4:16 PM      Narrative     US LOWER EXTREMITY VENOUS DUPLEX LEFT 5/8/2018 4:12 PM    HISTORY: Left lower extremity swelling and calf pain.    TECHNIQUE: Color flow and doppler spectral waveform analysis of deep  venous structures is performed.  Imaged deep venous structures of the  left lower extremity include the common femoral vein, femoral vein,  popliteal vein, and visualized posterior deep calf veins.    COMPARISON: 4/8/2016.    FINDINGS: No DVT is demonstrated. Additional sonography to the areas  of pain which include the anterior mid thigh and mid calf, show no  focal finding.        Impression     IMPRESSION: Negative.     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 Patient here for left lower  extremity pain and swelling and left  lower back pain. Checking patency of iliac stent.    TECHNIQUE: Volumetric acquisition through abdomen and pelvis with 100  mL Isovue-370 IV contrast. Radiation dose for this scan was reduced  using automated exposure control, adjustment of the mA and/or kV  according to patient size, or iterative reconstruction technique.    COMPARISON: 11/15/2016.    FINDINGS: Fatty infiltration of the liver. Gallbladder, spleen,  pancreas, adrenal glands and kidneys are negative. No hydronephrosis.    Left iliac vein stent. Symmetric enhancement within the stent and no  filling defects seen within the stent to suggest thrombus. Mild aortic  calcification.    Hysterectomy. No suspicious pelvic masses. No bowel obstruction,  ascites or other acute findings.        Impression     IMPRESSION:  1. No acute abnormality.  2. Fatty infiltration of the liver.   3. Stable appearance of left iliac vein stent.                Thank you for choosing [facility]       Thank you for choosing [facility] for your care. Our goal is always to provide you with excellent care. Hearing back from our patients is one way we can continue to improve our services. Please take a few minutes to complete the written survey that you may receive in the mail after you visit with us. Thank you!        [facility] Information     [facility] gives you secure access to your electronic health record. If you see a primary care provider, you can also send messages to your care team and make appointments. If you have questions, please call your primary care clinic.  If you do not have a primary care provider, please call [phone] and they will assist you.        Care EveryWhere ID     This is your Care EveryWhere ID. This could be used by other organizations to access your [facility] medical records  [id number]        Equal Access to Services     RENETTA FARRAR : jus Orellana qaybta kaalmada adeegyada, waxay  duncan tran ah. So [facility] [phone].    ATENCIÓN: Si habla español, tiene a ames disposición servicios gratuitos de asistencia lingüística. Llame al [phone].    We comply with applicable federal civil rights laws and [place] laws. We do not discriminate on the basis of race, color, national origin, age, disability, sex, sexual orientation, or gender identity.            After Visit Summary       This is your record. Keep this with you and show to your community pharmacist(s) and doctor(s) at your next visit.

## 2018-05-09 ENCOUNTER — TELEPHONE (OUTPATIENT)
Dept: OTHER | Facility: CLINIC | Age: 38
End: 2018-05-09

## 2018-05-09 NOTE — TELEPHONE ENCOUNTER
Pt referred to [facility] by Yvrose ATKINSON PA-C for Left leg swelling, May-Thurner syndrome, back pain    Pertinent history/ imaging includes: CT, All in EPIC stent in [facility]    Pt needs to be schedueld for consult with vascular medicine.  Will route to scheduling to coordinate and appointment next available in [place]    Ivan Wheeler RN, BSN

## 2018-05-17 ENCOUNTER — OFFICE VISIT (OUTPATIENT)
Dept: VASCULAR SURGERY | Facility: CLINIC | Age: 38
End: 2018-05-17
Payer: COMMERCIAL

## 2018-05-17 VITALS
BODY MASS INDEX: 44.68 KG/M2 | TEMPERATURE: 98.3 F | HEART RATE: 66 BPM | SYSTOLIC BLOOD PRESSURE: 110 MMHG | DIASTOLIC BLOOD PRESSURE: 84 MMHG | WEIGHT: 278 LBS | HEIGHT: 66 IN

## 2018-05-17 DIAGNOSIS — I87.1 MAY-THURNER SYNDROME: ICD-10-CM

## 2018-05-17 DIAGNOSIS — I83.93 VARICOSE VEINS OF LEGS: Primary | ICD-10-CM

## 2018-05-17 LAB — D DIMER PPP FEU-MCNC: <0.3 UG/ML FEU (ref 0–0.5)

## 2018-05-17 PROCEDURE — 83516 IMMUNOASSAY NONANTIBODY: CPT | Performed by: INTERNAL MEDICINE

## 2018-05-17 PROCEDURE — 99204 OFFICE O/P NEW MOD 45 MIN: CPT | Performed by: INTERNAL MEDICINE

## 2018-05-17 PROCEDURE — 83876 ASSAY MYELOPEROXIDASE: CPT | Performed by: INTERNAL MEDICINE

## 2018-05-17 PROCEDURE — 36415 COLL VENOUS BLD VENIPUNCTURE: CPT | Performed by: INTERNAL MEDICINE

## 2018-05-17 PROCEDURE — 85379 FIBRIN DEGRADATION QUANT: CPT | Performed by: INTERNAL MEDICINE

## 2018-05-17 NOTE — PROGRESS NOTES
Vascular Medicine Consultation     Chief Complaint   May Thurner syndrome    Date of Admission:  (Not on file)    Summer Smith is a 37 year old female who was admitted on (Not on file). I was asked to see the patient for May Thurner syndrome for follow-up.    Code Status    Full code    Reason for Consult   Reason for consult: I was asked by PCP to evaluate this patient for May Thurner syndrome.    Primary Care Physician   Kalyan Hogan      History is obtained from the patient    History of Present Illness   Summer Smith is a 37 year old female who presents with May Thurner syndrome follow-up, patient was diagnosed with May Thurner syndrome back in 2011 she had left iliac stent placed back then at [place] secondary to left lower extremity DVT which was complicated by bilateral PE then a year later patient was going for hysterectomy she ended up with DVT and another PE they try to place a IVC filter but they could not since then patient has been on Pradaxa and lately she complained of left leg swelling for which she had CT venography which showed patent stent with no evidence of DVT  Patient was supposed to be wearing compression stockings but she is not  Patient is exhibiting signs and symptoms of PTS with occasional leg pain and leg swelling which is worse at the end of the day  Patient is still on Pradaxa and as I mentioned she is not wearing her compression stockings  No prominent varicose veins, no ulcers, no spontaneous bleeding, and no restless leg symptoms    Past Medical History   I have reviewed this patient's medical history and updated it with pertinent information if needed.   Past Medical History:   Diagnosis Date     DVT (deep vein thrombosis) in pregnancy (H) 2007     Pulmonary embolism (H) 2007    no long term anticoagulation, no recurrence       Past Surgical History   I have reviewed this patient's surgical history and updated it with pertinent information if needed.  Past Surgical  History:   Procedure Laterality Date     AS LIGATN SHORT SAPHEN      had great saphenous vein removal     DAVINCI HYSTERECTOMY TOTAL      tubes removed, ovaries in place     STENT      venous in leg       Prior to Admission Medications   Cannot display prior to admission medications because the patient has not been admitted in this contact.     Allergies   Allergies   Allergen Reactions     Sulfa Drugs Hives     Vitamin K Anaphylaxis     Vit K IV only     Penicillin G Rash     Amoxicillin-Pot Clavulanate Rash       Social History   I have reviewed this patient's social history and updated it with pertinent information if needed. Summer ANDRADE Luis  reports that she has never smoked. She has never used smokeless tobacco. She reports that she drinks alcohol. She reports that she does not use illicit drugs.    Family History   I have reviewed this patient's family history and updated it with pertinent information if needed.   Family History   Problem Relation Age of Onset     DIABETES Father      Hypertension Father      Hyperlipidemia Father      Colon Cancer Maternal Grandfather      Liver Cancer Maternal Grandfather      CANCER Paternal Grandfather      Thyroid Disease Mother      Thyroid Disease Sister      Thyroid Disease Sister      Endocrine Disease Daughter      Lucille, has appointment end of July 2016       Review of Systems   The 10 point Review of Systems is negative other than noted in the HPI or here.     Physical Exam   Temp: 98.3  F (36.8  C) Temp src: Oral BP: 110/84 Pulse: 66            Vital Signs with Ranges  Temp:  [98.3  F (36.8  C)] 98.3  F (36.8  C)  Pulse:  [66] 66  BP: (110-122)/(84) 110/84  278 lbs 0 oz    Constitutional: awake, alert, cooperative, no apparent distress, and appears stated age  Eyes: Lids and lashes normal, pupils equal, round and reactive to light, extra ocular muscles intact, sclera clear, conjunctiva normal  ENT: normocepalic, without obvious abnormality, oropharynx pink and  moist  Hematologic / Lymphatic: no lymphadenopathy  Respiratory: No increased work of breathing, good air exchange, clear to auscultation bilaterally, no crackles or wheezing  Cardiovascular: regular rate and rhythm, normal S1 and S2 and no murmur noted  GI: Normal bowel sounds, soft, non-distended, non-tender  Skin: no redness, warmth, or swelling, no rashes and no lesions  Musculoskeletal: There is no redness, warmth, or swelling of the joints.  Full range of motion noted.  Motor strength is 5 out of 5 all extremities bilaterally.  Tone is normal.  Neurologic: Awake, alert, oriented to name, place and time.  Cranial nerves II-XII are grossly intact.  Motor is 5 out of 5 bilaterally.    Neuropsychiatric:  Normal affect, memory, insight.  Pulses: Palpable pulses. No carotid bruits appreciated.     Data   Most Recent 3 CBC's:  Recent Labs   Lab Test  06/20/17   1255  11/25/16   1316  11/15/16   2235   WBC  7.8  8.7  8.2   HGB  13.5  12.6  14.0   MCV  82  85  83   PLT  326  318  349     Most Recent 3 BMP's:  Recent Labs   Lab Test  06/20/17   1255 12/22/16 11/30/16 11/25/16   1316   11/15/16   2235   NA  135   --    --   138   --   140   POTASSIUM  4.0  4.8  4.6  4.4   < >  4.2   CHLORIDE  104   --    --   106   --   105   CO2  25   --    --   27   --   26   BUN  13   --    --   12   --   13   CR  0.63  0.76  0.69  0.79   < >  0.74   ANIONGAP  6   --    --   5   --   9   SARAH  8.8   --    --   9.0   --   9.2   GLC  92  99  98  86   < >  117*    < > = values in this interval not displayed.     Most Recent 2 LFT's:  Recent Labs   Lab Test 11/30/16 11/25/16   1316   04/08/16   1227   AST  14  9   < >  22   ALT  20  26   < >  76*   ALKPHOS   --   56   --   66   BILITOTAL   --   0.3   --   0.3    < > = values in this interval not displayed.     Most Recent D-dimer:No lab results found.  Most Recent Cholesterol Panel:  Recent Labs   Lab Test  09/20/17   0931   CHOL  226*   LDL  163*   HDL  42*   TRIG  106     Most Recent  Anemia Panel:  Recent Labs   Lab Test  06/20/17   1255   04/08/16   1227   WBC  7.8   < >  7.1   HGB  13.5   < >  14.3   HCT  42.6   < >  43.8   MCV  82   < >  85   PLT  326   < >  304   AL   --    --   48    < > = values in this interval not displayed.     IMPRESSION:  1. No acute abnormality.  2. Fatty infiltration of the liver.   3. Stable appearance of left iliac vein stent.       Assessment & Plan   (I83.93) Varicose veins of legs  (primary encounter diagnosis)  Comment: Continue to wear compression stockings      (I87.1) May-Thurner syndrome  Comment: Patent left iliac stent, continue Pradaxa for now, will check d-dimer  Plan: D dimer quantitative, Vasculitis panel              Summary: Follow-up with the patient in 1 month from now after wearing the compression stockings, will check d-dimer make sure there is no active clotting processes going on although this would be unlikely in the presence of Pradaxa  Patient did mention that she is thinking of stopping her Pradaxa  We will discuss this with the patient in 1 month from now when I see her for the follow-up    Andrzej Vasquez MD

## 2018-05-18 LAB
MYELOPEROXIDASE AB SER-ACNC: <0.2 AI (ref 0–0.9)
PROTEINASE3 IGG SER-ACNC: <0.2 AI (ref 0–0.9)

## 2018-11-10 ENCOUNTER — HOSPITAL ENCOUNTER (EMERGENCY)
Facility: CLINIC | Age: 38
Discharge: HOME | End: 2018-11-10
Attending: PHYSICIAN ASSISTANT | Admitting: PHYSICIAN ASSISTANT
Payer: COMMERCIAL

## 2018-11-10 VITALS
RESPIRATION RATE: 18 BRPM | DIASTOLIC BLOOD PRESSURE: 97 MMHG | OXYGEN SATURATION: 100 % | SYSTOLIC BLOOD PRESSURE: 146 MMHG | TEMPERATURE: 97.9 F

## 2018-11-10 DIAGNOSIS — N76.0 ACUTE VAGINITIS: ICD-10-CM

## 2018-11-10 LAB
ALBUMIN UR-MCNC: NEGATIVE MG/DL
APPEARANCE UR: CLEAR
BACTERIA #/AREA URNS HPF: ABNORMAL /HPF
BILIRUB UR QL STRIP: NEGATIVE
COLOR UR AUTO: YELLOW
GLUCOSE UR STRIP-MCNC: NEGATIVE MG/DL
HGB UR QL STRIP: NEGATIVE
KETONES UR STRIP-MCNC: NEGATIVE MG/DL
LEUKOCYTE ESTERASE UR QL STRIP: NEGATIVE
MUCOUS THREADS #/AREA URNS LPF: PRESENT /LPF
NITRATE UR QL: NEGATIVE
PH UR STRIP: 5 PH (ref 5–7)
RBC #/AREA URNS AUTO: 0 /HPF (ref 0–2)
SOURCE: ABNORMAL
SP GR UR STRIP: 1.02 (ref 1–1.03)
SPECIMEN SOURCE: NORMAL
SQUAMOUS #/AREA URNS AUTO: 4 /HPF (ref 0–1)
UROBILINOGEN UR STRIP-MCNC: 0 MG/DL (ref 0–2)
WBC #/AREA URNS AUTO: 2 /HPF (ref 0–5)
WET PREP SPEC: NORMAL

## 2018-11-10 PROCEDURE — 81001 URINALYSIS AUTO W/SCOPE: CPT | Performed by: PHYSICIAN ASSISTANT

## 2018-11-10 PROCEDURE — 87210 SMEAR WET MOUNT SALINE/INK: CPT | Performed by: PHYSICIAN ASSISTANT

## 2018-11-10 PROCEDURE — 99214 OFFICE O/P EST MOD 30 MIN: CPT | Mod: Z6 | Performed by: PHYSICIAN ASSISTANT

## 2018-11-10 PROCEDURE — G0463 HOSPITAL OUTPT CLINIC VISIT: HCPCS | Performed by: PHYSICIAN ASSISTANT

## 2018-11-10 PROCEDURE — 87086 URINE CULTURE/COLONY COUNT: CPT | Performed by: PHYSICIAN ASSISTANT

## 2018-11-10 RX ORDER — BETAMETHASONE DIPROPIONATE 0.5 MG/G
OINTMENT, AUGMENTED TOPICAL
Qty: 15 G | Refills: 0 | Status: SHIPPED | OUTPATIENT
Start: 2018-11-10 | End: 2019-12-09

## 2018-11-10 NOTE — ED TRIAGE NOTES
Patient presents today with possible Uti or bacterial vaginosis. Symptoms started three weeks ago . Arrived to urgent care ambulatory .

## 2018-11-10 NOTE — ED PROVIDER NOTES
History     Chief Complaint   Patient presents with     UTI     burning with urination no frequency     HPI  Summer Smith is a 38 year old female who presents to the urgent care with concern for possible vaginal infection.  For the last 3 weeks patient has had some vaginal discharge.  She attempted to treat initially with OTC antifungal with temporary improvement, however states that symptoms later returned and were more severe.  She is currently using a 7-day OTC anti yeast suppository, last used yesterday evening. In the the last 2-3 days she has also began to experience some dysuria and suprapubic pressure.  She complained of subjective fever, chills, nausea yesterday however none today.  She has not had any urinary frequency, urgency, hematuria.  She is status post hypersecretion bilateral salpingectomy however have ovaries remain.       Problem List:    Patient Active Problem List    Diagnosis Date Noted     Left leg swelling 01/03/2017     Priority: Medium     SOB (shortness of breath) 01/03/2017     Priority: Medium     History of pulmonary embolism 01/03/2017     Priority: Medium     Postoperative hemorrhage involving genitourinary system following genitourinary procedure 12/27/2016     Priority: Medium     Significant vaginal bleeding after hysterectomy, requiring hospitalization at [facility].       Morbid obesity due to excess calories (H) 12/27/2016     Priority: Medium     History of hysterectomy 12/27/2016     Priority: Medium     Pericardial cyst 12/27/2016     Priority: Medium     Mild intermittent reactive airway disease without complication 09/17/2015     Priority: Medium     Abnormal PFT 2012, started on maintenance inhaler - [facility]  Symptoms with URIs       Varicose veins of legs 09/17/2015     Priority: Medium     Laser surgery left leg 2012 and left iliac vein stent placement 2012       Personal history of DVT (deep vein thrombosis) 08/26/2015     Priority: Medium     In 2007. Due to  May-Thurner syndrome and being on OCP.  On warfarin for 6 months.  With pregnancies she did heparin injections followed by post-partum warfarin.       Recurrent pulmonary emboli (H) 08/26/2015     Priority: Medium     2007 12/2016 after hysterectomy, complicated by vaginal bleeding, needing to be off anti-coagulation.  Following with Dr. White at [facility].  Recommends indefinite anticoagulation.        May-Thurner syndrome 08/26/2015     Priority: Medium     History of vein stenting in iliac/femoral area.        Past Medical History:    Past Medical History:   Diagnosis Date     DVT (deep vein thrombosis) in pregnancy (H) 2007     Pulmonary embolism (H) 2007     Past Surgical History:    Past Surgical History:   Procedure Laterality Date     AS LIGATN SHORT SAPHEN      had great saphenous vein removal     DAVINCI HYSTERECTOMY TOTAL      tubes removed, ovaries in place     STENT      venous in leg     Family History:    Family History   Problem Relation Age of Onset     Diabetes Father      Hypertension Father      Hyperlipidemia Father      Colon Cancer Maternal Grandfather      Liver Cancer Maternal Grandfather      Cancer Paternal Grandfather      Thyroid Disease Mother      Thyroid Disease Sister      Thyroid Disease Sister      Endocrine Disease Daughter      Lucille, has appointment end of July 2016     Social History:  Marital Status:   [2]  Social History   Substance Use Topics     Smoking status: Never Smoker     Smokeless tobacco: Never Used     Alcohol use Yes      Comment: rare      Medications:      albuterol (PROAIR HFA/PROVENTIL HFA/VENTOLIN HFA) 108 (90 BASE) MCG/ACT Inhaler   Cetirizine HCl (ZYRTEC ALLERGY PO)   Cholecalciferol (VITAMIN D3) 3000 UNITS TABS   dabigatran ANTICOAGULANT (PRADAXA) 150 MG CAPS capsule   fluticasone (FLONASE) 50 MCG/ACT nasal spray   HYDROcodone-acetaminophen (NORCO) 5-325 MG per tablet   order for DME   phentermine 15 MG capsule   topiramate (TOPAMAX) 50 MG  tablet     Review of Systems  CONSTITUTIONAL:POSITIVE for resolved subjective fever, chills NEGATIVE for change in weight  INTEGUMENTARY/SKIN: NEGATIVE for worrisome rashes, moles or lesions  RESP:NEGATIVE for significant cough or SOB  GI: POSITIVE for suprapubic pain and NEGATIVE for nausea, vomiting   : POSITIVE for dysuria, vaginal discharge, burning NEGATIVE for increased urinary frequency, urgency, hematuria  Physical Exam   BP: (!) 146/97  Heart Rate: 62  Temp: 97.9  F (36.6  C)  Resp: 18  SpO2: 100 %  Physical Exam  GENERAL APPEARANCE: healthy, alert and no distress  RESP: lungs clear to auscultation - no rales, rhonchi or wheezes  CV: regular rates and rhythm, normal S1 S2, no murmur noted  ABDOMEN:  soft, nontender, no HSM or masses and bowel sounds normal  BACK: No CVA tenderness  GU_female: external genitalia normal, there is thick white discharge present in vagina   SKIN: no suspicious lesions or rashes  ED Course     ED Course     Procedures        Critical Care time:  none        Results for orders placed or performed during the hospital encounter of 11/10/18   UA with Microscopic   Result Value Ref Range    Color Urine Yellow     Appearance Urine Clear     Glucose Urine Negative NEG^Negative mg/dL    Bilirubin Urine Negative NEG^Negative    Ketones Urine Negative NEG^Negative mg/dL    Specific Gravity Urine 1.016 1.003 - 1.035    Blood Urine Negative NEG^Negative    pH Urine 5.0 5.0 - 7.0 pH    Protein Albumin Urine Negative NEG^Negative mg/dL    Urobilinogen mg/dL 0.0 0.0 - 2.0 mg/dL    Nitrite Urine Negative NEG^Negative    Leukocyte Esterase Urine Negative NEG^Negative    Source Midstream Urine     WBC Urine 2 0 - 5 /HPF    RBC Urine 0 0 - 2 /HPF    Bacteria Urine Moderate (A) NEG^Negative /HPF    Squamous Epithelial /HPF Urine 4 (H) 0 - 1 /HPF    Mucous Urine Present (A) NEG^Negative /LPF   Urine Culture   Result Value Ref Range    Specimen Description Midstream Urine     Special Requests  Specimen received in preservative     Culture Micro No growth    Wet prep   Result Value Ref Range    Specimen Description Vagina     Wet Prep No yeast seen     Wet Prep No Trichomonas seen     Wet Prep No clue cells seen     Wet Prep No WBC's seen      Medications - No data to display  Assessments & Plan (with Medical Decision Making)     I have reviewed the nursing notes.    I have reviewed the findings, diagnosis, plan and need for follow up with the patient.       Discharge Medication List as of 11/10/2018  6:56 PM      START taking these medications    Details   augmented betamethasone dipropionate (DIPROLENE) 0.05 % ointment Apply sparingly to affected area once daily for 14 days.  Do not apply to face.Disp-15 g, [id number]           Final diagnoses:   Acute vaginitis     A 8-year-old female presents to urgent care with concern over ongoing vaginal burning, discharge with new onset dysuria, suprapubic pressure in the last several days.  He had stable vital signs upon arrival.  Physical exam findings as described above.  As part of evaluation she did have urinalysis which was negative for evidence of infection culture pending at time of discharge.  Prep did not demonstrate any evidence of, clue cells, trichomonas.  Differential for her symptoms include atrophic vaginitis, chemical irritation.  Will attempt treatment with diprolene ointment. Follow up with PCP if no improvement in 3-5 days.  Worrisome reasons to return to ER/UC sooner discussed.    Disclaimer: This note consists of symbols derived from keyboarding, dictation, and/or voice recognition software. As a result, there may be errors in the script that have gone undetected.  Please consider this when interpreting information found in the chart.      11/10/2018   [facility] EMERGENCY DEPARTMENT     Purvi Gonzalez PA-C  11/12/18 1100

## 2018-11-10 NOTE — ED AVS SNAPSHOT
[facility] Emergency Department    [address] [address]    Phone:  [phone]    Fax:  [phone]                                       Summer Smith   MRN: [id number]    Department:  [facility] Emergency Department   Date of Visit:  11/10/2018           Patient Information     Date Of Birth          1980        Your diagnoses for this visit were:     None       You were seen by Purvi Gonzalez PA-C.      Follow-up Information     Follow up with Kalyan Hogan MD In 3 days.    Specialty:  Internal Medicine    Why:  As needed, If symptoms worsen    Contact information:    [address] [address]  [phone]        24 Hour Appointment Hotline       To make an appointment at any [facility] clinic, call [phone] ([phone]). If you don't have a family doctor or clinic, we will help you find one. [facility] clinics are conveniently located to serve the needs of you and your family.             Review of your medicines      START taking        Dose / Directions Last dose taken    augmented betamethasone dipropionate 0.05 % ointment   Commonly known as:  DIPROLENE-AF   Quantity:  15 g        Apply sparingly to affected area once daily for 14 days.  Do not apply to face.   Refills:  0          Our records show that you are taking the medicines listed below. If these are incorrect, please call your family doctor or clinic.        Dose / Directions Last dose taken    albuterol 108 (90 Base) MCG/ACT inhaler   Commonly known as:  PROAIR HFA/PROVENTIL HFA/VENTOLIN HFA   Dose:  2 puff   Quantity:  1 Inhaler        Inhale 2 puffs into the lungs every 4 hours as needed   Refills:  3        dabigatran ANTICOAGULANT 150 MG capsule   Commonly known as:  PRADAXA   Dose:  150 mg   Quantity:  60 capsule        Take 1 capsule (150 mg) by mouth 2 times daily Store in original 's bottle or blister pack; use within 120 days of opening. Do not crush or open capsule.    Refills:  0        fluticasone 50 MCG/ACT spray   Commonly known as:  FLONASE   Dose:  1-2 spray   Quantity:  16 g        Spray 1-2 sprays into both nostrils daily   Refills:  0        HYDROcodone-acetaminophen 5-325 MG per tablet   Commonly known as:  NORCO   Dose:  1 tablet   Quantity:  9 tablet        Take 1 tablet by mouth every 6 hours as needed for pain   Refills:  0        order for DME   Quantity:  1 each        Equipment being ordered: thigh high compression socks 25-35 mm Hg.  Please measure   Refills:  11        phentermine 15 MG capsule   Dose:  15 mg   Quantity:  30 capsule        Take 1 capsule (15 mg) by mouth every morning   Refills:  5        topiramate 50 MG tablet   Commonly known as:  TOPAMAX   Quantity:  30 tablet        Take 1/2 tab daily for 2 weeks, then increase to full tab.   Refills:  2        Vitamin D3 3000 units Tabs   Dose:  1 tablet   Indication:  2000 units        Take 1 tablet by mouth daily   Refills:  0        ZYRTEC ALLERGY PO        Refills:  0                Prescriptions were sent or printed at these locations (1 Prescription)                   [facility] [address]   [address] [address]    Telephone:  [phone]   Fax:  [phone]   Hours:                  E-Prescribed (1 of 1)         augmented betamethasone dipropionate (DIPROLENE-AF) 0.05 % ointment                Procedures and tests performed during your visit     UA with Microscopic    Urine Culture    Wet prep      Orders Needing Specimen Collection     None      Pending Results     Date and Time Order Name Status Description    11/10/2018 1736 Urine Culture In process             Pending Culture Results     Date and Time Order Name Status Description    11/10/2018 1736 Urine Culture In process             Pending Results Instructions     If you had any lab results that were not finalized at the time of your Discharge, you can call the ED Lab Result RN at  [phone]. You will be contacted by this team for any positive Lab results or changes in treatment. The nurses are available 7 days a week from 10A to 6:30P.  You can leave a message 24 hours per day and they will return your call.        Test Results From Your Hospital Stay        11/10/2018  5:37 PM         11/10/2018  6:11 PM      Component Results     Component Value Ref Range & Units Status    Color Urine Yellow  Final    Appearance Urine Clear  Final    Glucose Urine Negative NEG^Negative mg/dL Final    Bilirubin Urine Negative NEG^Negative Final    Ketones Urine Negative NEG^Negative mg/dL Final    Specific Gravity Urine 1.016 1.003 - 1.035 Final    Blood Urine Negative NEG^Negative Final    pH Urine 5.0 5.0 - 7.0 pH Final    Protein Albumin Urine Negative NEG^Negative mg/dL Final    Urobilinogen mg/dL 0.0 0.0 - 2.0 mg/dL Final    Nitrite Urine Negative NEG^Negative Final    Leukocyte Esterase Urine Negative NEG^Negative Final    Source Midstream Urine  Final    WBC Urine 2 0 - 5 /HPF Final    RBC Urine 0 0 - 2 /HPF Final    Bacteria Urine Moderate (A) NEG^Negative /HPF Final    Squamous Epithelial /HPF Urine 4 (H) 0 - 1 /HPF Final    Mucous Urine Present (A) NEG^Negative /LPF Final         11/10/2018  6:46 PM      Component Results     Component    Specimen Description    Vagina    Wet Prep    No yeast seen    Wet Prep    No Trichomonas seen    Wet Prep    No clue cells seen    Wet Prep    No WBC's seen                Thank you for choosing [facility]       Thank you for choosing [facility] for your care. Our goal is always to provide you with excellent care. Hearing back from our patients is one way we can continue to improve our services. Please take a few minutes to complete the written survey that you may receive in the mail after you visit with us. Thank you!        [employer] Information     [facility] gives you secure access to your electronic health record. If you see a primary care provider, you can also  send messages to your care team and make appointments. If you have questions, please call your primary care clinic.  If you do not have a primary care provider, please call [phone] and they will assist you.        Care EveryWhere ID     This is your Care EveryWhere ID. This could be used by other organizations to access your [facility] medical records  [id number]        Equal Access to Services     RENETTA FARRAR : Hadii sahil David, wadagmar padgett, qaybta kaalmada katie, jackie tran . So [facility] [phone].    ATENCIÓN: Si habla español, tiene a ames disposición servicios gratuitos de asistencia lingüística. Will al [phone].    We comply with applicable federal civil rights laws and [place] laws. We do not discriminate on the basis of race, color, national origin, age, disability, sex, sexual orientation, or gender identity.            After Visit Summary       This is your record. Keep this with you and show to your community pharmacist(s) and doctor(s) at your next visit.

## 2018-11-10 NOTE — ED AVS SNAPSHOT
[facility] Emergency Department    [address] [address]    Phone:  [phone]    Fax:  [phone]                                       Summer Smith   MRN: [id number]    Department:  [facility] Emergency Department   Date of Visit:  11/10/2018           After Visit Summary Signature Page     I have received my discharge instructions, and my questions have been answered. I have discussed any challenges I see with this plan with the nurse or doctor.    ..........................................................................................................................................  Patient/Patient Representative Signature      ..........................................................................................................................................  Patient Representative Print Name and Relationship to Patient    ..................................................               ................................................  Date                                   Time    ..........................................................................................................................................  Reviewed by Signature/Title    ...................................................              ..............................................  Date                                               Time          22EPIC Rev 08/18

## 2018-11-11 LAB
BACTERIA SPEC CULT: NO GROWTH
Lab: NORMAL
SPECIMEN SOURCE: NORMAL

## 2018-11-12 ENCOUNTER — OFFICE VISIT (OUTPATIENT)
Dept: FAMILY MEDICINE | Facility: CLINIC | Age: 38
End: 2018-11-12
Payer: COMMERCIAL

## 2018-11-12 VITALS
DIASTOLIC BLOOD PRESSURE: 86 MMHG | BODY MASS INDEX: 45.52 KG/M2 | OXYGEN SATURATION: 100 % | TEMPERATURE: 97.6 F | RESPIRATION RATE: 16 BRPM | WEIGHT: 282 LBS | SYSTOLIC BLOOD PRESSURE: 136 MMHG | HEART RATE: 74 BPM

## 2018-11-12 DIAGNOSIS — R10.13 EPIGASTRIC PAIN: ICD-10-CM

## 2018-11-12 DIAGNOSIS — R10.2 PELVIC PAIN IN FEMALE: ICD-10-CM

## 2018-11-12 DIAGNOSIS — N63.22 BREAST LUMP ON LEFT SIDE AT 10 O'CLOCK POSITION: Primary | ICD-10-CM

## 2018-11-12 LAB
ALBUMIN SERPL-MCNC: 3.7 G/DL (ref 3.4–5)
ALP SERPL-CCNC: 65 U/L (ref 40–150)
ALT SERPL W P-5'-P-CCNC: 30 U/L (ref 0–50)
ANION GAP SERPL CALCULATED.3IONS-SCNC: 7 MMOL/L (ref 3–14)
AST SERPL W P-5'-P-CCNC: 19 U/L (ref 0–45)
BILIRUB SERPL-MCNC: 0.3 MG/DL (ref 0.2–1.3)
BUN SERPL-MCNC: 12 MG/DL (ref 7–30)
CALCIUM SERPL-MCNC: 9.6 MG/DL (ref 8.5–10.1)
CHLORIDE SERPL-SCNC: 103 MMOL/L (ref 94–109)
CO2 SERPL-SCNC: 28 MMOL/L (ref 20–32)
CREAT SERPL-MCNC: 0.82 MG/DL (ref 0.52–1.04)
ERYTHROCYTE [DISTWIDTH] IN BLOOD BY AUTOMATED COUNT: 12.5 % (ref 10–15)
GFR SERPL CREATININE-BSD FRML MDRD: 79 ML/MIN/1.7M2
GLUCOSE SERPL-MCNC: 89 MG/DL (ref 70–99)
HCT VFR BLD AUTO: 44.7 % (ref 35–47)
HGB BLD-MCNC: 14.5 G/DL (ref 11.7–15.7)
LIPASE SERPL-CCNC: 137 U/L (ref 73–393)
MCH RBC QN AUTO: 27.4 PG (ref 26.5–33)
MCHC RBC AUTO-ENTMCNC: 32.4 G/DL (ref 31.5–36.5)
MCV RBC AUTO: 85 FL (ref 78–100)
PLATELET # BLD AUTO: 298 10E9/L (ref 150–450)
POTASSIUM SERPL-SCNC: 4.2 MMOL/L (ref 3.4–5.3)
PROT SERPL-MCNC: 7.9 G/DL (ref 6.8–8.8)
RBC # BLD AUTO: 5.29 10E12/L (ref 3.8–5.2)
SODIUM SERPL-SCNC: 138 MMOL/L (ref 133–144)
WBC # BLD AUTO: 9.5 10E9/L (ref 4–11)

## 2018-11-12 PROCEDURE — 99214 OFFICE O/P EST MOD 30 MIN: CPT | Performed by: INTERNAL MEDICINE

## 2018-11-12 PROCEDURE — 85027 COMPLETE CBC AUTOMATED: CPT | Performed by: INTERNAL MEDICINE

## 2018-11-12 PROCEDURE — 36415 COLL VENOUS BLD VENIPUNCTURE: CPT | Performed by: INTERNAL MEDICINE

## 2018-11-12 PROCEDURE — 83690 ASSAY OF LIPASE: CPT | Performed by: INTERNAL MEDICINE

## 2018-11-12 PROCEDURE — 80053 COMPREHEN METABOLIC PANEL: CPT | Performed by: INTERNAL MEDICINE

## 2018-11-12 NOTE — MR AVS SNAPSHOT
After Visit Summary   11/12/2018    Summer Smith    MRN: [id number]           Patient Information     Date Of Birth          1980        Visit Information        Provider Department      11/12/2018 2:00 PM Kalyan Hogan MD [facility]        Today's Diagnoses     Breast lump on left side at 10 o'clock position    -  1    Epigastric pain        Pelvic pain in female          Care Instructions    We'll get blood work today, then collect a stool sample for H pylori testing at home.  After you collect this sample, start taking omeprazole daily for 1-2 weeks to see if this helps.  If not, let me know.    Schedule the mammogram and breast ultrasound.           Follow-ups after your visit        Follow-up notes from your care team     Return in about 2 weeks (around 11/26/2018), or if symptoms worsen or fail to improve.      Future tests that were ordered for you today     Open Future Orders        Priority Expected Expires Ordered    MA Diagnostic Digital Bilateral Routine  11/12/2019 11/12/2018    US Breast Left Limited 1-3 Quadrants Routine  11/12/2019 11/12/2018    H Pylori antigen stool Routine  12/12/2018 11/12/2018            Who to contact     If you have questions or need follow up information about today's clinic visit or your schedule please contact [facility] directly at [phone].  Normal or non-critical lab and imaging results will be communicated to you by MyChart, letter or phone within 4 business days after the clinic has received the results. If you do not hear from us within 7 days, please contact the clinic through MyChart or phone. If you have a critical or abnormal lab result, we will notify you by phone as soon as possible.  Submit refill requests through [employer] or call your pharmacy and they will forward the refill request to us. Please allow 3 business days for your refill to be completed.          Additional Information About Your Visit         [employer] Information     [employer] gives you secure access to your electronic health record. If you see a primary care provider, you can also send messages to your care team and make appointments. If you have questions, please call your primary care clinic.  If you do not have a primary care provider, please call [phone] and they will assist you.        Care EveryWhere ID     This is your Care EveryWhere ID. This could be used by other organizations to access your [facility] medical records  [id number]        Your Vitals Were     Pulse Temperature Respirations Last Period Pulse Oximetry BMI (Body Mass Index)    74 97.6  F (36.4  C) (Tympanic) 16 08/01/2016 100% 45.52 kg/m2       Blood Pressure from Last 3 Encounters:   11/12/18 136/86   11/10/18 (!) 146/97   05/17/18 110/84    Weight from Last 3 Encounters:   11/12/18 282 lb (127.9 kg)   05/17/18 278 lb (126.1 kg)   05/08/18 278 lb (126.1 kg)              We Performed the Following     CBC with platelets     Comprehensive metabolic panel     Lipase        Primary Care Provider Office Phone # Fax #    AleJennifer Hogan [phone] [address] [address]        Equal Access to Services     RENETTA FARRAR : Hadii aad ku hadasho Soomaali, waaxda luqadaha, qaybta kaalmada adeegyada, waxay lissettein humza tran . So [facility] [phone].    ATENCIÓN: Si habla español, tiene a ames disposición servicios gratuitos de asistencia lingüística. Llame al [phone].    We comply with applicable federal civil rights laws and [place] laws. We do not discriminate on the basis of race, color, national origin, age, disability, sex, sexual orientation, or gender identity.            Thank you!     Thank you for choosing [facility]  for your care. Our goal is always to provide you with excellent care. Hearing back from our patients is one way we can continue to improve our services. Please take a few minutes to complete the  written survey that you may receive in the mail after your visit with us. Thank you!             Your Updated Medication List - Protect others around you: Learn how to safely use, store and throw away your medicines at www.disposemymeds.org.          This list is accurate as of 11/12/18  2:13 PM.  Always use your most recent med list.                   Brand Name Dispense Instructions for use Diagnosis    albuterol 108 (90 Base) MCG/ACT inhaler    PROAIR HFA/PROVENTIL HFA/VENTOLIN HFA    1 Inhaler    Inhale 2 puffs into the lungs every 4 hours as needed    Mild intermittent reactive airway disease without complication       augmented betamethasone dipropionate 0.05 % ointment    DIPROLENE-AF    15 g    Apply sparingly to affected area once daily for 14 days.  Do not apply to face.        dabigatran ANTICOAGULANT 150 MG capsule    PRADAXA    60 capsule    Take 1 capsule (150 mg) by mouth 2 times daily Store in original 's bottle or blister pack; use within 120 days of opening. Do not crush or open capsule.        fluticasone 50 MCG/ACT spray    FLONASE    16 g    Spray 1-2 sprays into both nostrils daily    Post-nasal drainage       order for DME     1 each    Equipment being ordered: thigh high compression socks 25-35 mm Hg.  Please measure    Personal history of DVT (deep vein thrombosis)       phentermine 15 MG capsule     30 capsule    Take 1 capsule (15 mg) by mouth every morning    Obesity, Class III, BMI 40-49.9 (morbid obesity) (H)       Vitamin D3 3000 units Tabs      Take 1 tablet by mouth daily        ZYRTEC ALLERGY PO

## 2018-11-12 NOTE — PROGRESS NOTES
SUBJECTIVE:   Summer Smith is a 38 year old female who presents to clinic today for the following health issues:    ED/UC Followup:    Facility:  [facility]  Date of visit: 11/10/2018  Reason for visit: Lower abdominal pain.  Current Status: Patient is still having the lower abdominal pain today. Patient is still nauseous and has chills. Patient denies any vomiting.       Had suprapubic pain, this is still ongoing and radiating upward.  Pain worse with empty stomach, some better with eating.  Chills, no fevers.  Fatigued.  Normal BMs the past few days, no diarrhea.  No blood in urine or stools, no black stools.  Had some burning with urination- was given betamethasone topical for vaginitis and this did help.  No frequency or urgency of urination.  No vaginal discharge recently.      Rarely has reflux, will take 20mg omeprazole for a few days.  Has an ongoing sour taste in her mouth.  Had a lot of stress recently.     Tried OTC vaginal yeast treatment with no improvement.    She noticed a left breast lump around 9 o'clock.  Two different lumps that are new- noticed about 3 weeks ago.  No pain.  One has not changed but the one closer to midline has increased in size.  No skin changes or nipple discharge.         Problem list and histories reviewed & adjusted, as indicated.  Additional history: as documented    Patient Active Problem List   Diagnosis     Personal history of DVT (deep vein thrombosis)     Recurrent pulmonary emboli (H)     May-Thurner syndrome     Mild intermittent reactive airway disease without complication     Varicose veins of legs     Postoperative hemorrhage involving genitourinary system following genitourinary procedure     Morbid obesity due to excess calories (H)     History of hysterectomy     Pericardial cyst     Left leg swelling     SOB (shortness of breath)     History of pulmonary embolism     Past Surgical History:   Procedure Laterality Date     AS LIGATN SHORT  SAPHKAMERON      had great saphenous vein removal     DAVINCI HYSTERECTOMY TOTAL      tubes removed, ovaries in place     STENT      venous in leg       Social History   Substance Use Topics     Smoking status: Never Smoker     Smokeless tobacco: Never Used     Alcohol use Yes      Comment: rare     Family History   Problem Relation Age of Onset     Diabetes Father      Hypertension Father      Hyperlipidemia Father      Colon Cancer Maternal Grandfather      Liver Cancer Maternal Grandfather      Cancer Paternal Grandfather      Thyroid Disease Mother      Thyroid Disease Sister      Thyroid Disease Sister      Endocrine Disease Daughter      Lucille, has appointment end of July 2016         Current Outpatient Prescriptions   Medication Sig Dispense Refill     augmented betamethasone dipropionate (DIPROLENE-AF) 0.05 % ointment Apply sparingly to affected area once daily for 14 days.  Do not apply to face. 15 g 0     Cetirizine HCl (ZYRTEC ALLERGY PO)        Cholecalciferol (VITAMIN D3) 3000 UNITS TABS Take 1 tablet by mouth daily        dabigatran ANTICOAGULANT (PRADAXA) 150 MG CAPS capsule Take 1 capsule (150 mg) by mouth 2 times daily Store in original 's bottle or blister pack; use within 120 days of opening. Do not crush or open capsule. 60 capsule      fluticasone (FLONASE) 50 MCG/ACT nasal spray Spray 1-2 sprays into both nostrils daily 16 g 0     albuterol (PROAIR HFA/PROVENTIL HFA/VENTOLIN HFA) 108 (90 BASE) MCG/ACT Inhaler Inhale 2 puffs into the lungs every 4 hours as needed (Patient not taking: Reported on 11/12/2018) 1 Inhaler 3     order for DME Equipment being ordered: thigh high compression socks 25-35 mm Hg.  Please measure (Patient not taking: Reported on 11/12/2018) 1 each 11     phentermine 15 MG capsule Take 1 capsule (15 mg) by mouth every morning (Patient not taking: Reported on 11/12/2018) 30 capsule 5     Allergies   Allergen Reactions     Sulfa Drugs Hives     Vitamin K Anaphylaxis      Vit K IV only     Penicillin G Rash     Amoxicillin-Pot Clavulanate Rash       Reviewed and updated as needed this visit by clinical staff  Allergies       Reviewed and updated as needed this visit by Provider         ROS:  Constitutional, gi and gu systems are negative, except as otherwise noted.    OBJECTIVE:     /86  Pulse 74  Temp 97.6  F (36.4  C) (Tympanic)  Resp 16  Wt 282 lb (127.9 kg)  LMP 08/01/2016  SpO2 100%  BMI 45.52 kg/m2  Body mass index is 45.52 kg/(m^2).  GENERAL: healthy, alert and no distress  BREAST: left breast has increased lumpiness in general in the upper medial quadrant with one more prominent lump at about 10 o'clock about 1cm in size  RESP: lungs clear to auscultation - no rales, rhonchi or wheezes  CV: regular rate and rhythm, normal S1 S2, no S3 or S4, no murmur, click or rub  ABDOMEN: soft, mild epigastric and mid pelvic tenderness, no rebound or guarding, and bowel sounds normal    Diagnostic Test Results:  No results found for this or any previous visit (from the past 24 hour(s)).    ASSESSMENT/PLAN:       1. Breast lump on left side at 10 o'clock position    Recommend diagnostic imaging    - MA Diagnostic Digital Bilateral; Future  - US Breast Left Limited 1-3 Quadrants; Future    2. Epigastric pain  3. Pelvic pain in female    Summer presents with ongoing epigastric and mid pelvic tenderness with nausea.  She is s/p hysterectomy.  Recent normal UA and wet prep in urgent care.  Vaginal discharge has resolved and burning has improved with betamethasone given in UC, but pain and nausea persist.  Pain is worse on empty stomach, so I wonder about gastritis or PUD.  Blood work in process, will have her collect stool sample for H pylori testing and then start PPI and continue daily for 1-2 weeks.  Follow-up as needed if not improving in a 2 weeks or new/worsening symptoms develop.       - CBC with platelets  - Comprehensive metabolic panel  - Lipase  - H Pylori antigen  stool; Future      AleJennifer Hogan MD  [facility]

## 2018-11-12 NOTE — PATIENT INSTRUCTIONS
We'll get blood work today, then collect a stool sample for H pylori testing at home.  After you collect this sample, start taking omeprazole daily for 1-2 weeks to see if this helps.  If not, let me know.    Schedule the mammogram and breast ultrasound.

## 2018-11-13 DIAGNOSIS — R10.13 EPIGASTRIC PAIN: ICD-10-CM

## 2018-11-13 PROCEDURE — 87338 HPYLORI STOOL AG IA: CPT | Performed by: INTERNAL MEDICINE

## 2018-11-14 LAB
H PYLORI AG STL QL IA: NORMAL
SPECIMEN SOURCE: NORMAL

## 2018-12-03 ENCOUNTER — HOSPITAL ENCOUNTER (OUTPATIENT)
Dept: MAMMOGRAPHY | Facility: CLINIC | Age: 38
Discharge: HOME | End: 2018-12-03
Attending: INTERNAL MEDICINE | Admitting: INTERNAL MEDICINE
Payer: COMMERCIAL

## 2018-12-03 ENCOUNTER — HOSPITAL ENCOUNTER (OUTPATIENT)
Dept: ULTRASOUND IMAGING | Facility: CLINIC | Age: 38
End: 2018-12-03
Attending: INTERNAL MEDICINE
Payer: COMMERCIAL

## 2018-12-03 DIAGNOSIS — N63.22 BREAST LUMP ON LEFT SIDE AT 10 O'CLOCK POSITION: ICD-10-CM

## 2018-12-03 PROCEDURE — G0279 TOMOSYNTHESIS, MAMMO: HCPCS

## 2018-12-03 PROCEDURE — 76642 ULTRASOUND BREAST LIMITED: CPT | Mod: LT

## 2018-12-12 NOTE — PROGRESS NOTES
Jaja was seen in the ED on 11/10 for vaginal symptoms and had a normal wet prep, so she was given betamethasone ointment to try.  She was self treating with yeast infection at the time of the ED visit.  She messaged on 11/16 with ongoing vaginal odor, dyspareunia, and suprapubic pain.  Reports no concern for STIs.

## 2018-12-13 ENCOUNTER — OFFICE VISIT (OUTPATIENT)
Dept: FAMILY MEDICINE | Facility: CLINIC | Age: 38
End: 2018-12-13
Payer: COMMERCIAL

## 2018-12-13 VITALS
BODY MASS INDEX: 44.77 KG/M2 | OXYGEN SATURATION: 96 % | HEART RATE: 87 BPM | TEMPERATURE: 99 F | DIASTOLIC BLOOD PRESSURE: 86 MMHG | WEIGHT: 277.4 LBS | SYSTOLIC BLOOD PRESSURE: 142 MMHG

## 2018-12-13 DIAGNOSIS — Z11.3 SCREEN FOR STD (SEXUALLY TRANSMITTED DISEASE): ICD-10-CM

## 2018-12-13 DIAGNOSIS — N89.8 VAGINAL DISCHARGE: Primary | ICD-10-CM

## 2018-12-13 DIAGNOSIS — N76.0 BV (BACTERIAL VAGINOSIS): ICD-10-CM

## 2018-12-13 DIAGNOSIS — B96.89 BV (BACTERIAL VAGINOSIS): ICD-10-CM

## 2018-12-13 LAB
HBV CORE AB SERPL QL IA: NONREACTIVE
HBV SURFACE AB SERPL IA-ACNC: 34.55 M[IU]/ML
HBV SURFACE AG SERPL QL IA: NONREACTIVE
HCV AB SERPL QL IA: NONREACTIVE
HIV 1+2 AB+HIV1 P24 AG SERPL QL IA: NONREACTIVE
SPECIMEN SOURCE: ABNORMAL
WET PREP SPEC: ABNORMAL

## 2018-12-13 PROCEDURE — 86803 HEPATITIS C AB TEST: CPT | Performed by: INTERNAL MEDICINE

## 2018-12-13 PROCEDURE — 87210 SMEAR WET MOUNT SALINE/INK: CPT | Performed by: INTERNAL MEDICINE

## 2018-12-13 PROCEDURE — 99213 OFFICE O/P EST LOW 20 MIN: CPT | Performed by: INTERNAL MEDICINE

## 2018-12-13 PROCEDURE — 87491 CHLMYD TRACH DNA AMP PROBE: CPT | Performed by: INTERNAL MEDICINE

## 2018-12-13 PROCEDURE — 86706 HEP B SURFACE ANTIBODY: CPT | Performed by: INTERNAL MEDICINE

## 2018-12-13 PROCEDURE — 86780 TREPONEMA PALLIDUM: CPT | Performed by: INTERNAL MEDICINE

## 2018-12-13 PROCEDURE — 36415 COLL VENOUS BLD VENIPUNCTURE: CPT | Performed by: INTERNAL MEDICINE

## 2018-12-13 PROCEDURE — 87340 HEPATITIS B SURFACE AG IA: CPT | Performed by: INTERNAL MEDICINE

## 2018-12-13 PROCEDURE — 87389 HIV-1 AG W/HIV-1&-2 AB AG IA: CPT | Performed by: INTERNAL MEDICINE

## 2018-12-13 PROCEDURE — 87591 N.GONORRHOEAE DNA AMP PROB: CPT | Performed by: INTERNAL MEDICINE

## 2018-12-13 PROCEDURE — 86704 HEP B CORE ANTIBODY TOTAL: CPT | Performed by: INTERNAL MEDICINE

## 2018-12-13 RX ORDER — METRONIDAZOLE 500 MG/1
500 TABLET ORAL 2 TIMES DAILY
Qty: 14 TABLET | Refills: 0 | Status: SHIPPED | OUTPATIENT
Start: 2018-12-13 | End: 2018-12-20

## 2018-12-13 NOTE — PROGRESS NOTES
SUBJECTIVE:   Summer Smith is a 38 year old female who presents to clinic today for the following health issues:    Chief Complaint   Patient presents with     Vaginal Problem     std testing - known exposure        Jaja was seen in the ED on 11/10 for vaginal symptoms and had a normal wet prep, so she was given betamethasone ointment to try.  She was self treating with yeast infection at the time of the ED visit.  She messaged on 11/16 with ongoing vaginal odor, dyspareunia, and suprapubic pain.     Vaginal Symptoms      Duration: x months - has been seen and deferred STD testing because did not feel it was relevant and found out over the weekend, it is relevant.  Her  told her that he has sexual intercourse with another man in October (at least 6 weeks ago), and Summer was sexually active with her  shortly after that.  Her  is also having symptoms.     Description  Burning  Had some discharge at the onset, but no longer    Intensity:  mild    Accompanying signs and symptoms (fever/dysuria/abdominal or back pain): suprapubic pain - comes and goes.     History  Sexually active: yes, single partner, contraception - hysterectomy  Possibility of pregnancy: No  Recent antibiotic use: no     Precipitating or alleviating factors: None    Therapies tried and outcome: betamethasone cream   Outcome: temporary relief      Problem list and histories reviewed & adjusted, as indicated.  Additional history: as documented    Patient Active Problem List   Diagnosis     Personal history of DVT (deep vein thrombosis)     Recurrent pulmonary emboli (H)     May-Thurner syndrome     Mild intermittent reactive airway disease without complication     Varicose veins of legs     Postoperative hemorrhage involving genitourinary system following genitourinary procedure     Morbid obesity due to excess calories (H)     History of hysterectomy     Pericardial cyst     Left leg swelling     SOB (shortness of  breath)     History of pulmonary embolism     Past Surgical History:   Procedure Laterality Date     AS LIGATN SHORT SAPHKAMERON      had great saphenous vein removal     DAVINCI HYSTERECTOMY TOTAL      tubes removed, ovaries in place     STENT      venous in leg       Social History     Tobacco Use     Smoking status: Never Smoker     Smokeless tobacco: Never Used   Substance Use Topics     Alcohol use: Yes     Comment: rare     Family History   Problem Relation Age of Onset     Diabetes Father      Hypertension Father      Hyperlipidemia Father      Colon Cancer Maternal Grandfather      Liver Cancer Maternal Grandfather      Cancer Paternal Grandfather      Thyroid Disease Mother      Thyroid Disease Sister      Thyroid Disease Sister      Endocrine Disease Daughter         Lucille, has appointment end of July 2016         Current Outpatient Medications   Medication Sig Dispense Refill     CALCIUM-VITAMIN D PO        Cetirizine HCl (ZYRTEC ALLERGY PO)        Cholecalciferol (VITAMIN D3) 3000 UNITS TABS Take 1 tablet by mouth daily        dabigatran ANTICOAGULANT (PRADAXA) 150 MG CAPS capsule Take 1 capsule (150 mg) by mouth 2 times daily Store in original 's bottle or blister pack; use within 120 days of opening. Do not crush or open capsule. 60 capsule      fluticasone (FLONASE) 50 MCG/ACT nasal spray Spray 1-2 sprays into both nostrils daily 16 g 0     metroNIDAZOLE (FLAGYL) 500 MG tablet Take 1 tablet (500 mg) by mouth 2 times daily for 7 days 14 tablet 0     albuterol (PROAIR HFA/PROVENTIL HFA/VENTOLIN HFA) 108 (90 BASE) MCG/ACT Inhaler Inhale 2 puffs into the lungs every 4 hours as needed (Patient not taking: Reported on 11/12/2018) 1 Inhaler 3     augmented betamethasone dipropionate (DIPROLENE-AF) 0.05 % ointment Apply sparingly to affected area once daily for 14 days.  Do not apply to face. 15 g 0     order for DME Equipment being ordered: thigh high compression socks 25-35 mm Hg.  Please measure  (Patient not taking: Reported on 11/12/2018) 1 each 11     phentermine 15 MG capsule Take 1 capsule (15 mg) by mouth every morning (Patient not taking: Reported on 11/12/2018) 30 capsule 5     Allergies   Allergen Reactions     Sulfa Drugs Hives     Vitamin K Anaphylaxis     Vit K IV only     Penicillin G Rash     Amoxicillin-Pot Clavulanate Rash       Reviewed and updated as needed this visit by clinical staff       Reviewed and updated as needed this visit by Provider         ROS:  Constitutional and gu systems are negative, except as otherwise noted.    OBJECTIVE:     /86 (BP Location: Left arm, Patient Position: Chair, Cuff Size: Adult Large)   Pulse 87   Temp 99  F (37.2  C) (Tympanic)   Wt 125.8 kg (277 lb 6.4 oz)   LMP 08/01/2016   SpO2 96%   BMI 44.77 kg/m    Body mass index is 44.77 kg/m .  GENERAL: healthy, alert and no distress   (female): normal female external genitalia, normal urethral meatus  and thin white discharge present in vaginal, cervix not visualized (hx hysterectomy)    Diagnostic Test Results:  Results for orders placed or performed in visit on 12/13/18 (from the past 24 hour(s))   Wet prep   Result Value Ref Range    Specimen Description Vagina     Wet Prep No yeast seen     Wet Prep Clue cells seen (A)     Wet Prep No Trichomonas seen        ASSESSMENT/PLAN:         1. Vaginal discharge  2. Screen for STD (sexually transmitted disease)    Jaja presents with ongoing vaginal symptoms and recently discovered her  had an extramarital sexual encounter with a man at least 6 weeks ago.  She would like full STI testing.  She endorses decreased mood since discovering this and says she may contact me later for assistance if mood is not improving.  Advised her she is certainly welcome to do so.     - Treponema Abs w Reflex to RPR and Titer  - NEISSERIA GONORRHOEA PCR  - CHLAMYDIA TRACHOMATIS PCR  - Hepatitis B core antibody  - Hepatitis B Surface Antibody  - Hepatitis B  surface antigen  - Hepatitis C antibody  - HIV Antigen Antibody Combo  - Wet prep    3. BV (bacterial vaginosis)    Wet prep with clue cells suggestive of BV.  She was previously treated with Metrogel for BV earlier this year and would like to try PO now.  Follow-up as needed if not improving in a week or new/worsening symptoms develop.       - metroNIDAZOLE (FLAGYL) 500 MG tablet; Take 1 tablet (500 mg) by mouth 2 times daily for 7 days  Dispense: 14 tablet; Refill: 0      AleJennifer Hogan MD  [facility]

## 2018-12-14 LAB
C TRACH DNA SPEC QL NAA+PROBE: NEGATIVE
N GONORRHOEA DNA SPEC QL NAA+PROBE: NEGATIVE
SPECIMEN SOURCE: NORMAL
SPECIMEN SOURCE: NORMAL
T PALLIDUM AB SER QL: NONREACTIVE

## 2018-12-26 ENCOUNTER — HOSPITAL ENCOUNTER (EMERGENCY)
Facility: CLINIC | Age: 38
Discharge: HOME | End: 2018-12-26
Attending: NURSE PRACTITIONER | Admitting: NURSE PRACTITIONER
Payer: COMMERCIAL

## 2018-12-26 VITALS
TEMPERATURE: 98.3 F | WEIGHT: 270 LBS | HEIGHT: 66 IN | DIASTOLIC BLOOD PRESSURE: 83 MMHG | OXYGEN SATURATION: 97 % | SYSTOLIC BLOOD PRESSURE: 146 MMHG | BODY MASS INDEX: 43.39 KG/M2 | RESPIRATION RATE: 16 BRPM

## 2018-12-26 DIAGNOSIS — B37.0 THRUSH: ICD-10-CM

## 2018-12-26 LAB
INTERNAL QC OK POCT: YES
S PYO AG THROAT QL IA.RAPID: NEGATIVE

## 2018-12-26 PROCEDURE — 87081 CULTURE SCREEN ONLY: CPT | Performed by: NURSE PRACTITIONER

## 2018-12-26 PROCEDURE — G0463 HOSPITAL OUTPT CLINIC VISIT: HCPCS

## 2018-12-26 PROCEDURE — 87880 STREP A ASSAY W/OPTIC: CPT | Performed by: NURSE PRACTITIONER

## 2018-12-26 PROCEDURE — 99213 OFFICE O/P EST LOW 20 MIN: CPT | Performed by: NURSE PRACTITIONER

## 2018-12-26 RX ORDER — NYSTATIN 100000/ML
500000 SUSPENSION, ORAL (FINAL DOSE FORM) ORAL 4 TIMES DAILY
Qty: 200 ML | Refills: 0 | Status: SHIPPED | OUTPATIENT
Start: 2018-12-26 | End: 2019-01-05

## 2018-12-26 ASSESSMENT — ENCOUNTER SYMPTOMS
CHILLS: 0
FATIGUE: 0
FEVER: 0
COUGH: 0

## 2018-12-26 ASSESSMENT — MIFFLIN-ST. JEOR: SCORE: 1926.46

## 2018-12-26 NOTE — ED AVS SNAPSHOT
[facility]  [address] [address]  Phone:  [phone]  Fax:  [phone]                                    Summer Smith   MRN: [id number]    Department:  [facility]   Date of Visit:  12/26/2018           After Visit Summary Signature Page    I have received my discharge instructions, and my questions have been answered. I have discussed any challenges I see with this plan with the nurse or doctor.    ..........................................................................................................................................  Patient/Patient Representative Signature      ..........................................................................................................................................  Patient Representative Print Name and Relationship to Patient    ..................................................               ................................................  Date                                   Time    ..........................................................................................................................................  Reviewed by Signature/Title    ...................................................              ..............................................  Date                                               Time          22EPIC Rev 08/18

## 2018-12-26 NOTE — DISCHARGE INSTRUCTIONS
Nystatin 5 ml swish in mouth for 30 seconds then swallow four times a day for 10 days.  Return for worsening symptoms.

## 2018-12-26 NOTE — ED PROVIDER NOTES
History     Chief Complaint   Patient presents with     Pharyngitis     patchy white, throat and tongue     HPI  Summer Smith is a 38 year old female who presents to urgent care for evaluation of white patchy tongue and throat.  Mouth feels painful.  Symptoms started 10 days ago shortly after she finished a course of antibiotics (Flagyl).  Denies fever.  Denies vomiting.      Problem List:    Patient Active Problem List    Diagnosis Date Noted     Left leg swelling 01/03/2017     Priority: Medium     SOB (shortness of breath) 01/03/2017     Priority: Medium     History of pulmonary embolism 01/03/2017     Priority: Medium     Postoperative hemorrhage involving genitourinary system following genitourinary procedure 12/27/2016     Priority: Medium     Significant vaginal bleeding after hysterectomy, requiring hospitalization at [place].       Morbid obesity due to excess calories (H) 12/27/2016     Priority: Medium     History of hysterectomy 12/27/2016     Priority: Medium     Pericardial cyst 12/27/2016     Priority: Medium     Mild intermittent reactive airway disease without complication 09/17/2015     Priority: Medium     Abnormal PFT 2012, started on maintenance inhaler - [facility]  Symptoms with URIs       Varicose veins of legs 09/17/2015     Priority: Medium     Laser surgery left leg 2012 and left iliac vein stent placement 2012       Personal history of DVT (deep vein thrombosis) 08/26/2015     Priority: Medium     In 2007. Due to May-Thurner syndrome and being on OCP.  On warfarin for 6 months.  With pregnancies she did heparin injections followed by post-partum warfarin.       Recurrent pulmonary emboli (H) 08/26/2015     Priority: Medium     2007 12/2016 after hysterectomy, complicated by vaginal bleeding, needing to be off anti-coagulation.  Following with Dr. White at [facility].  Recommends indefinite anticoagulation.        May-Thurner syndrome 08/26/2015     Priority: Medium      History of vein stenting in iliac/femoral area.          Past Medical History:    Past Medical History:   Diagnosis Date     DVT (deep vein thrombosis) in pregnancy (H) 2007     Pulmonary embolism (H) 2007       Past Surgical History:    Past Surgical History:   Procedure Laterality Date     AS LIGATN SHORT SAPHKAMERON      had great saphenous vein removal     DAVINCI HYSTERECTOMY TOTAL      tubes removed, ovaries in place     STENT      venous in leg       Family History:    Family History   Problem Relation Age of Onset     Diabetes Father      Hypertension Father      Hyperlipidemia Father      Colon Cancer Maternal Grandfather      Liver Cancer Maternal Grandfather      Cancer Paternal Grandfather      Thyroid Disease Mother      Thyroid Disease Sister      Thyroid Disease Sister      Endocrine Disease Daughter         Lucille, has appointment end of July 2016       Social History:  Marital Status:   [2]  Social History     Tobacco Use     Smoking status: Never Smoker     Smokeless tobacco: Never Used   Substance Use Topics     Alcohol use: Yes     Comment: rare     Drug use: No        Medications:      nystatin (MYCOSTATIN) 031773 UNIT/ML suspension   albuterol (PROAIR HFA/PROVENTIL HFA/VENTOLIN HFA) 108 (90 BASE) MCG/ACT Inhaler   augmented betamethasone dipropionate (DIPROLENE-AF) 0.05 % ointment   CALCIUM-VITAMIN D PO   Cetirizine HCl (ZYRTEC ALLERGY PO)   Cholecalciferol (VITAMIN D3) 3000 UNITS TABS   dabigatran ANTICOAGULANT (PRADAXA) 150 MG CAPS capsule   fluticasone (FLONASE) 50 MCG/ACT nasal spray   order for DME   phentermine 15 MG capsule         Review of Systems   Constitutional: Negative for chills, fatigue and fever.   HENT: Positive for mouth sores. Negative for congestion.    Respiratory: Negative for cough.    Cardiovascular: Negative for chest pain.   Skin: Negative for rash.       Physical Exam   BP: 146/83  Heart Rate: 60  Temp: 98.3  F (36.8  C)  Resp: 16  Height: 167.6 cm (5'  "6\")  Weight: 122.5 kg (270 lb)  SpO2: 97 %      Physical Exam    GENERAL APPEARANCE: healthy, alert and no distress  EYES: EOMI, conjunctiva clear  HENT: bilateral ear canals clear, intact, and without inflammation. Right TM normal. Left TM normal. Nose normal.  Tongue with notable white patches.   NECK: supple, nontender, no lymphadenopathy  RESP: lungs clear to auscultation - no rales, rhonchi or wheezes  CV: regular rates and rhythm, normal S1 S2, no murmur noted      ED Course        Procedures               Results for orders placed or performed during the hospital encounter of 12/26/18 (from the past 24 hour(s))   Rapid strep group A screen POCT   Result Value Ref Range    Rapid Strep A Screen NEGATIVE neg    Internal QC OK Yes        Medications - No data to display    Assessments & Plan (with Medical Decision Making)   History and exam is clinically consistent with oral thrush.  Patient was given an Rx for nystatin.  Worrisome reasons to recheck discussed.  I have reviewed the nursing notes.    I have reviewed the findings, diagnosis, plan and need for follow up with the patient.         Medication List      Started    nystatin 388346 UNIT/ML suspension  Commonly known as:  MYCOSTATIN  500,000 Units, Oral, 4 TIMES DAILY            Final diagnoses:   Thrush       12/26/2018   [facility] [place] EMERGENCY DEPARTMENT     Patricia Castle APRN CNP  12/26/18 2238    "

## 2018-12-28 LAB
BACTERIA SPEC CULT: NORMAL
Lab: NORMAL
SPECIMEN SOURCE: NORMAL

## 2018-12-28 NOTE — RESULT ENCOUNTER NOTE
Final Beta strep group A r/o culture is NEGATIVE for Group A streptococcus.    No treatment or change in treatment per [place] Strep protocol.

## 2019-02-18 ENCOUNTER — TELEPHONE (OUTPATIENT)
Dept: FAMILY MEDICINE | Facility: CLINIC | Age: 39
End: 2019-02-18

## 2019-02-18 NOTE — TELEPHONE ENCOUNTER
Panel Management Review      Patient has the following on her problem list:     Asthma review     ACT Total Scores 5/8/2018   ACT TOTAL SCORE (Goal Greater than or Equal to 20) 25   In the past 12 months, how many times did you visit the emergency room for your asthma without being admitted to the hospital? 0   In the past 12 months, how many times were you hospitalized overnight because of your asthma? 0      1. Is Asthma diagnosis on the Problem List? Yes    2. Is Asthma listed on Health Maintenance? Yes    3. Patient is due for:  ACT      Composite cancer screening  Chart review shows that this patient is due/due soon for the following None  Summary:    Patient is due/failing the following:   ACT    Action needed:   Patient needs to do ACT.    Type of outreach:    Sent [employer] message.  Will postpone x 1 week for patient to complete.    Questions for provider review:    None                                                                                                                                    HUSSAIN Frausto MA       Chart routed to Care Team .

## 2019-02-20 ASSESSMENT — ASTHMA QUESTIONNAIRES: ACT_TOTALSCORE: 25

## 2019-03-10 ENCOUNTER — MYC MEDICAL ADVICE (OUTPATIENT)
Dept: FAMILY MEDICINE | Facility: CLINIC | Age: 39
End: 2019-03-10

## 2019-03-10 DIAGNOSIS — Z11.1 SCREENING EXAMINATION FOR PULMONARY TUBERCULOSIS: Primary | ICD-10-CM

## 2019-03-11 NOTE — TELEPHONE ENCOUNTER
Dr. 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  I have pended the blood test for your approval. Cookie STREETER RN

## 2019-03-11 NOTE — TELEPHONE ENCOUNTER
Patient notified of lab ordered via my chart message today.  Patient advised to make lab only appt.    Kristi SCHULZ Rn

## 2019-03-29 DIAGNOSIS — Z11.1 SCREENING EXAMINATION FOR PULMONARY TUBERCULOSIS: ICD-10-CM

## 2019-03-29 PROCEDURE — 86481 TB AG RESPONSE T-CELL SUSP: CPT | Performed by: INTERNAL MEDICINE

## 2019-03-29 PROCEDURE — 36415 COLL VENOUS BLD VENIPUNCTURE: CPT | Performed by: INTERNAL MEDICINE

## 2019-04-01 LAB
GAMMA INTERFERON BACKGROUND BLD IA-ACNC: 0.02 IU/ML
M TB IFN-G BLD-IMP: NEGATIVE
M TB IFN-G CD4+ BCKGRND COR BLD-ACNC: >10 IU/ML
MITOGEN IGNF BCKGRD COR BLD-ACNC: 0.01 IU/ML
MITOGEN IGNF BCKGRD COR BLD-ACNC: 0.03 IU/ML

## 2019-11-22 ENCOUNTER — ANCILLARY PROCEDURE (OUTPATIENT)
Dept: GENERAL RADIOLOGY | Facility: CLINIC | Age: 39
End: 2019-11-22
Attending: FAMILY MEDICINE
Payer: COMMERCIAL

## 2019-11-22 ENCOUNTER — OFFICE VISIT (OUTPATIENT)
Dept: ORTHOPEDICS | Facility: CLINIC | Age: 39
End: 2019-11-22
Payer: COMMERCIAL

## 2019-11-22 VITALS
WEIGHT: 292 LBS | DIASTOLIC BLOOD PRESSURE: 87 MMHG | HEIGHT: 66 IN | BODY MASS INDEX: 46.93 KG/M2 | SYSTOLIC BLOOD PRESSURE: 138 MMHG

## 2019-11-22 DIAGNOSIS — M25.572 PAIN OF JOINT OF LEFT ANKLE AND FOOT: ICD-10-CM

## 2019-11-22 DIAGNOSIS — M25.572 PAIN OF JOINT OF LEFT ANKLE AND FOOT: Primary | ICD-10-CM

## 2019-11-22 PROCEDURE — 99204 OFFICE O/P NEW MOD 45 MIN: CPT | Performed by: FAMILY MEDICINE

## 2019-11-22 PROCEDURE — 73610 X-RAY EXAM OF ANKLE: CPT | Mod: LT

## 2019-11-22 ASSESSMENT — MIFFLIN-ST. JEOR: SCORE: 2021.25

## 2019-11-22 NOTE — LETTER
2019         RE: Summer Smith  [address]  [place] [address]        Dear Colleague,    Thank you for referring your patient, Summer Smith, to the [facility]. Please see a copy of my visit note below.    Summer Smith  :  1980  DOS: 2019  MRN: [id number]    Sports Medicine Clinic Visit    PCP: Kalyan Hogan    Summer Smith is a 39 year old female who is seen as an AIC patient presenting with acute left ankle and foot pain.    Injury: Insidious onset of left ankle/anterior foot pain over the last 2 days after starting walking routine on her treadmill over the last 1 - 2 weeks.  Pain located over left anterior ankle joint, nonradiating.  Additional Features:  Positive: swelling.  Symptoms are better with No Treatment tried to date.  Symptoms are worse with: walking, going from sit to stand.  Other evaluation and/or treatments so far consists of: No Treatment tried to date.  Prior imaging: No recent imaging completed.  Prior History of related problems: none    Social History: works as nurse    Review of Systems  Musculoskeletal: as above  Remainder of review of systems is negative including constitutional, CV, pulmonary, GI, Skin and Neurologic except as noted in HPI or medical history.    Past Medical History:   Diagnosis Date     DVT (deep vein thrombosis) in pregnancy      Pulmonary embolism (H)     no long term anticoagulation, no recurrence     Past Surgical History:   Procedure Laterality Date     AS LIGATN SHORT SAPHEN      had great saphenous vein removal     DAVINCI HYSTERECTOMY TOTAL      tubes removed, ovaries in place     STENT      venous in leg     Family History   Problem Relation Age of Onset     Diabetes Father      Hypertension Father      Hyperlipidemia Father      Colon Cancer Maternal Grandfather      Liver Cancer Maternal Grandfather      Cancer Paternal Grandfather      Thyroid Disease Mother      Thyroid  "Disease Sister      Thyroid Disease Sister      Endocrine Disease Daughter         Lucille, has appointment end of July 2016       Objective  /87   Ht 1.676 m (5' 6\")   Wt 132.5 kg (292 lb)   LMP 08/01/2016   BMI 47.13 kg/m         General: healthy, alert and in no distress      HEENT: no scleral icterus or conjunctival erythema     Skin: no suspicious lesions or rash. No jaundice.     CV: regular rhythm by palpation, 2+ distal pulses, no pedal edema      Resp: normal respiratory effort without conversational dyspnea     Psych: normal mood and affect      Gait: mildly antalgic, appropriate coordination and balance     Neuro: normal light touch sensory exam of the extremities. Motor strength as noted below     Left Ankle/Foot Exam:    Inspection:       no visible ecchymosis       edema noted generally about the ankle       Normal DP artery pulse       Normal PT artery pulse    Foot inspection:       no deformity noted    ROM:        full ROM with dorsiflexion, plantarflexion, inversion and eversion    Tender:       dorsal tibiotalar joint       tarsal navicular    Non-Tender:       remainder of foot and ankle    Skin:       well perfused       capillary refill less than 2 seconds    Special Tests:       neg (-) anterior drawer        neg (-) talar tilt        neg (-) external rotation testing     Gait:       antalgic gait    Proprioception:        deferred      Radiology:  Recent Results (from the past 744 hour(s))   XR Ankle Left G/E 3 Views    Narrative    LEFT ANKLE THREE VIEWS   11/22/2019 9:39 AM    HISTORY: Pain of joint of left ankle and foot.    COMPARISON: None.      Impression    IMPRESSION: No fracture or acute abnormality is seen. The joint spaces  are well preserved. There is a large plantar calcaneal spur with a  moderate-sized posterior calcaneal spur. No other abnormality is seen.      MYRON KUNZ MD       Assessment:  1. Pain of joint of left ankle and foot        Plan:  Discussed " the assessment with the patient.  Follow up: 1-2 weeks prn  Signs of acute inflammation, possible early stress reaction to abrupt increase in WB activity recently, no acute injury  Anticipate his will improve with conservative care only  WBAT ok  Ankle compression sleeve vs brace options reviewed  Assistive device use and possibility of walking boot for worsening sx reviewed  XR images independently visualized and reviewed with patient today in clinic  No concerning acute findings, calcaneal spurs noted and reviewed  Low impact activity strategies reviewed  PT available prn  We discussed modified progressive pain-free activity as tolerated  Home handouts provided and supportive care reviewed  All questions were answered today  Contact us with additional questions or concerns  Signs and sx of concern reviewed      Jarocho Mckeon DO, JUDAH  Primary Care Sports Medicine  [facility]               Again, thank you for allowing me to participate in the care of your patient.        Sincerely,        Jarocho Mckeon DO

## 2019-11-22 NOTE — PROGRESS NOTES
"Summer Smith  :  1980  DOS: 2019  MRN: [id number]    Sports Medicine Clinic Visit    PCP: Kalyan Hogan    Summer Smith is a 39 year old female who is seen as an AIC patient presenting with acute left ankle and foot pain.    Injury: Insidious onset of left ankle/anterior foot pain over the last 2 days after starting walking routine on her treadmill over the last 1 - 2 weeks.  Pain located over left anterior ankle joint, nonradiating.  Additional Features:  Positive: swelling.  Symptoms are better with No Treatment tried to date.  Symptoms are worse with: walking, going from sit to stand.  Other evaluation and/or treatments so far consists of: No Treatment tried to date.  Prior imaging: No recent imaging completed.  Prior History of related problems: none    Social History: works as nurse    Review of Systems  Musculoskeletal: as above  Remainder of review of systems is negative including constitutional, CV, pulmonary, GI, Skin and Neurologic except as noted in HPI or medical history.    Past Medical History:   Diagnosis Date     DVT (deep vein thrombosis) in pregnancy      Pulmonary embolism (H)     no long term anticoagulation, no recurrence     Past Surgical History:   Procedure Laterality Date     AS LIGATN SHORT SAPHEN      had great saphenous vein removal     DAVINCI HYSTERECTOMY TOTAL      tubes removed, ovaries in place     STENT      venous in leg     Family History   Problem Relation Age of Onset     Diabetes Father      Hypertension Father      Hyperlipidemia Father      Colon Cancer Maternal Grandfather      Liver Cancer Maternal Grandfather      Cancer Paternal Grandfather      Thyroid Disease Mother      Thyroid Disease Sister      Thyroid Disease Sister      Endocrine Disease Daughter         Lucille, has appointment end of 2016       Objective  /87   Ht 1.676 m (5' 6\")   Wt 132.5 kg (292 lb)   LMP 2016   BMI 47.13 kg/m        General: healthy, alert " and in no distress      HEENT: no scleral icterus or conjunctival erythema     Skin: no suspicious lesions or rash. No jaundice.     CV: regular rhythm by palpation, 2+ distal pulses, no pedal edema      Resp: normal respiratory effort without conversational dyspnea     Psych: normal mood and affect      Gait: mildly antalgic, appropriate coordination and balance     Neuro: normal light touch sensory exam of the extremities. Motor strength as noted below     Left Ankle/Foot Exam:    Inspection:       no visible ecchymosis       edema noted generally about the ankle       Normal DP artery pulse       Normal PT artery pulse    Foot inspection:       no deformity noted    ROM:        full ROM with dorsiflexion, plantarflexion, inversion and eversion    Tender:       dorsal tibiotalar joint       tarsal navicular    Non-Tender:       remainder of foot and ankle    Skin:       well perfused       capillary refill less than 2 seconds    Special Tests:       neg (-) anterior drawer        neg (-) talar tilt        neg (-) external rotation testing     Gait:       antalgic gait    Proprioception:        deferred      Radiology:  Recent Results (from the past 744 hour(s))   XR Ankle Left G/E 3 Views    Narrative    LEFT ANKLE THREE VIEWS   11/22/2019 9:39 AM    HISTORY: Pain of joint of left ankle and foot.    COMPARISON: None.      Impression    IMPRESSION: No fracture or acute abnormality is seen. The joint spaces  are well preserved. There is a large plantar calcaneal spur with a  moderate-sized posterior calcaneal spur. No other abnormality is seen.      MYRON KUNZ MD       Assessment:  1. Pain of joint of left ankle and foot        Plan:  Discussed the assessment with the patient.  Follow up: 1-2 weeks prn  Signs of acute inflammation, possible early stress reaction to abrupt increase in WB activity recently, no acute injury  Anticipate his will improve with conservative care only  WBAT ok  Ankle compression  sleeve vs brace options reviewed  Assistive device use and possibility of walking boot for worsening sx reviewed  XR images independently visualized and reviewed with patient today in clinic  No concerning acute findings, calcaneal spurs noted and reviewed  Low impact activity strategies reviewed  PT available prn  We discussed modified progressive pain-free activity as tolerated  Home handouts provided and supportive care reviewed  All questions were answered today  Contact us with additional questions or concerns  Signs and sx of concern reviewed      Jarocho Mckeon DO, CAAPOLONIA  Primary Care Sports Medicine  [facility]

## 2019-12-09 ENCOUNTER — HOSPITAL ENCOUNTER (EMERGENCY)
Facility: CLINIC | Age: 39
Discharge: HOME | End: 2019-12-09
Attending: PHYSICIAN ASSISTANT | Admitting: PHYSICIAN ASSISTANT
Payer: COMMERCIAL

## 2019-12-09 ENCOUNTER — APPOINTMENT (OUTPATIENT)
Dept: GENERAL RADIOLOGY | Facility: CLINIC | Age: 39
End: 2019-12-09
Attending: PHYSICIAN ASSISTANT
Payer: COMMERCIAL

## 2019-12-09 VITALS
TEMPERATURE: 98.3 F | RESPIRATION RATE: 16 BRPM | BODY MASS INDEX: 46.61 KG/M2 | HEIGHT: 66 IN | OXYGEN SATURATION: 97 % | DIASTOLIC BLOOD PRESSURE: 128 MMHG | SYSTOLIC BLOOD PRESSURE: 155 MMHG | HEART RATE: 81 BPM | WEIGHT: 290 LBS

## 2019-12-09 DIAGNOSIS — M25.552 HIP PAIN, LEFT: ICD-10-CM

## 2019-12-09 PROCEDURE — 73502 X-RAY EXAM HIP UNI 2-3 VIEWS: CPT

## 2019-12-09 PROCEDURE — 99284 EMERGENCY DEPT VISIT MOD MDM: CPT | Mod: Z6 | Performed by: PHYSICIAN ASSISTANT

## 2019-12-09 PROCEDURE — 99283 EMERGENCY DEPT VISIT LOW MDM: CPT | Performed by: PHYSICIAN ASSISTANT

## 2019-12-09 PROCEDURE — 25000132 ZZH RX MED GY IP 250 OP 250 PS 637: Performed by: PHYSICIAN ASSISTANT

## 2019-12-09 RX ORDER — HYDROCODONE BITARTRATE AND ACETAMINOPHEN 5; 325 MG/1; MG/1
1 TABLET ORAL EVERY 6 HOURS PRN
Qty: 10 TABLET | Refills: 0 | Status: SHIPPED | OUTPATIENT
Start: 2019-12-09 | End: 2020-01-24

## 2019-12-09 RX ORDER — HYDROCODONE BITARTRATE AND ACETAMINOPHEN 5; 325 MG/1; MG/1
1 TABLET ORAL ONCE
Status: COMPLETED | OUTPATIENT
Start: 2019-12-09 | End: 2019-12-09

## 2019-12-09 RX ORDER — CHOLECALCIFEROL (VITAMIN D3) 50 MCG
50-100 TABLET ORAL DAILY
COMMUNITY

## 2019-12-09 RX ADMIN — HYDROCODONE BITARTRATE AND ACETAMINOPHEN 1 TABLET: 5; 325 TABLET ORAL at 11:22

## 2019-12-09 ASSESSMENT — MIFFLIN-ST. JEOR: SCORE: 2012.18

## 2019-12-09 NOTE — ED NOTES
Left hip pain started this morning. Took 800 mg Ibuprofen at 0815 this AM. Little relief with medication. Warm bath at home with little improvement of pain. States no recent falls. Has started working out about 3 weeks ago.

## 2019-12-09 NOTE — ED PROVIDER NOTES
History     Chief Complaint   Patient presents with     Hip Pain     left hip pain started this am, after being up for a while, started after she got up from the toilet, no pain with WB     HPI  Summer Smith is a 39 year old female with past medical history significant for history of PE/DVT, obesity, intermittent reactive airway disease, may-Thurner syndrome who presents to the emergency department with concern over left hip pain which began earlier this morning.  Patient stated she had some mild discomfort upon waking this morning.  She was able to ambulate around her house for 10 to 15 minutes however after she attempted to arise from toilet after going to the bathroom she had sudden onset of posterior left hip/back pain.  Patient describes pain as sharp, stabbing.  It is exacerbated by extension, abduction, walking.  She did have 2 episodes of loose stool this morning.  She denies any recent fever, chills, myalgias, cough, dyspnea, wheezing, chest pain, palpitations, nausea, vomiting, melena, hematochezia, dysuria, lower extremity numbness or paresthesias.  She attempted to treat with 800 mg of ibuprofen in a warm bath with some temporary improvement.  She reports that she has had ongoing sore back for some time however  has not been previously evaluated and has not had anything this severe.  She also notes that she did recently begin exercise program within the last 3 weeks.      Allergies:  Allergies   Allergen Reactions     Sulfa Drugs Hives     Vitamin K Anaphylaxis     Vit K IV only     Penicillin G Rash     Amoxicillin-Pot Clavulanate Rash       Problem List:    Patient Active Problem List    Diagnosis Date Noted     Left leg swelling 01/03/2017     Priority: Medium     SOB (shortness of breath) 01/03/2017     Priority: Medium     History of pulmonary embolism 01/03/2017     Priority: Medium     Postoperative hemorrhage involving genitourinary system following genitourinary procedure 12/27/2016      Priority: Medium     Significant vaginal bleeding after hysterectomy, requiring hospitalization at [facility].       Morbid obesity due to excess calories (H) 12/27/2016     Priority: Medium     History of hysterectomy 12/27/2016     Priority: Medium     Pericardial cyst 12/27/2016     Priority: Medium     Mild intermittent reactive airway disease without complication 09/17/2015     Priority: Medium     Abnormal PFT 2012, started on maintenance inhaler - [facility]  Symptoms with URIs       Varicose veins of legs 09/17/2015     Priority: Medium     Laser surgery left leg 2012 and left iliac vein stent placement 2012       Personal history of DVT (deep vein thrombosis) 08/26/2015     Priority: Medium     In 2007. Due to May-Thurner syndrome and being on OCP.  On warfarin for 6 months.  With pregnancies she did heparin injections followed by post-partum warfarin.       Recurrent pulmonary emboli (H) 08/26/2015     Priority: Medium     2007 12/2016 after hysterectomy, complicated by vaginal bleeding, needing to be off anti-coagulation.  Following with Dr. White at [facility].  Recommends indefinite anticoagulation.        May-Thurner syndrome 08/26/2015     Priority: Medium     History of vein stenting in iliac/femoral area.        Past Medical History:    Past Medical History:   Diagnosis Date     DVT (deep vein thrombosis) in pregnancy 2007     Pulmonary embolism (H) 2007       Past Surgical History:    Past Surgical History:   Procedure Laterality Date     AS LIGATN SHORT SAPHEN      had great saphenous vein removal     DAVINCI HYSTERECTOMY TOTAL      tubes removed, ovaries in place     STENT      venous in leg       Family History:    Family History   Problem Relation Age of Onset     Diabetes Father      Hypertension Father      Hyperlipidemia Father      Colon Cancer Maternal Grandfather      Liver Cancer Maternal Grandfather      Cancer Paternal Grandfather      Thyroid Disease Mother      Thyroid Disease  "Sister      Thyroid Disease Sister      Endocrine Disease Daughter         Lucille, has appointment end of July 2016       Social History:  Marital Status:   [2]  Social History     Tobacco Use     Smoking status: Never Smoker     Smokeless tobacco: Never Used   Substance Use Topics     Alcohol use: Yes     Comment: rare     Drug use: No        Medications:    albuterol (PROAIR HFA/PROVENTIL HFA/VENTOLIN HFA) 108 (90 BASE) MCG/ACT Inhaler  augmented betamethasone dipropionate (DIPROLENE-AF) 0.05 % ointment  CALCIUM-VITAMIN D PO  Cetirizine HCl (ZYRTEC ALLERGY PO)  Cholecalciferol (VITAMIN D3) 3000 UNITS TABS  dabigatran ANTICOAGULANT (PRADAXA) 150 MG CAPS capsule  fluticasone (FLONASE) 50 MCG/ACT nasal spray  order for DME  phentermine 15 MG capsule      Review of Systems   Constitutional: Negative for chills and fever.   Respiratory: Negative for cough, shortness of breath and wheezing.    Cardiovascular: Negative for chest pain and palpitations.   Gastrointestinal: Negative for abdominal pain, diarrhea, nausea and vomiting.   Genitourinary: Negative for dysuria, frequency, hematuria and urgency.   Musculoskeletal: Positive for arthralgias (left hip), back pain and gait problem.   Skin: Negative for color change, rash and wound.   Neurological: Negative for dizziness, tremors, light-headedness, numbness and headaches.   All other systems reviewed and are negative.    Physical Exam   BP: (!) 160/70  Heart Rate: 83  Temp: 98.3  F (36.8  C)  Resp: 16  Height: 167.6 cm (5' 6\")  Weight: 131.5 kg (290 lb)  SpO2: 98 %  Physical Exam  Constitutional:       General: She is in acute distress (mild, patient appears intermittently to be in pain).      Appearance: She is not ill-appearing or toxic-appearing.   HENT:      Head: Normocephalic and atraumatic.   Neck:      Musculoskeletal: Normal range of motion.   Cardiovascular:      Rate and Rhythm: Normal rate and regular rhythm.      Pulses: Normal pulses.      Heart " sounds: No murmur. No friction rub. No gallop.    Pulmonary:      Effort: Pulmonary effort is normal. No respiratory distress.      Breath sounds: Normal breath sounds. No wheezing, rhonchi or rales.   Abdominal:      General: Bowel sounds are normal. There is no distension.      Tenderness: There is no abdominal tenderness. There is no right CVA tenderness, left CVA tenderness, guarding or rebound.   Musculoskeletal:      Left hip: She exhibits decreased range of motion, decreased strength and tenderness (Posterior and lateral left hip). She exhibits no swelling, no crepitus, no deformity and no laceration.      Left knee: Normal.      Lumbar back: She exhibits decreased range of motion and tenderness. She exhibits no bony tenderness, no swelling, no edema, no deformity, no laceration, no pain, no spasm and normal pulse.   Skin:     General: Skin is warm and dry.      Findings: No abrasion, ecchymosis, erythema, laceration or rash.   Neurological:      Mental Status: She is alert.      Sensory: No sensory deficit.      Deep Tendon Reflexes:      Reflex Scores:       Patellar reflexes are 2+ on the right side and 2+ on the left side.      ED Course        Procedures        Critical Care time:  none        Results for orders placed or performed during the hospital encounter of 12/09/19   Pelvis XR w/ unilateral hip left    Narrative    PELVIS AND HIP LEFT ONE VIEW December 9, 2019 11:48 AM     HISTORY: Pain after attempting to rise from seated position this  morning.    COMPARISON: CT abdomen and pelvis exams dating back to 11/15/2016.      Impression    IMPRESSION: No acute bony abnormality. There is a 3 x 1.5 cm sharply  circumscribed focus of increased bony density involving the lateral  aspect of the left femoral neck. This lesion was present and appears  unchanged in size since CT exam of 11/15/2016. The CT showed that it  has a more lucent center. This is considered a benign lesion and may  be a focus of  fibrous dysplasia. No other bony abnormalities. Left  iliac vein stent is noted.    NASIM DUFFY MD       Medications   HYDROcodone-acetaminophen (NORCO) 5-325 MG per tablet 1 tablet (1 tablet Oral Given 12/9/19 1122)     Assessments & Plan (with Medical Decision Making)     I have reviewed the nursing notes.    I have reviewed the findings, diagnosis, plan and need for follow up with the patient.       Discharge Medication List as of 12/9/2019 12:18 PM      START taking these medications    Details   HYDROcodone-acetaminophen (NORCO) 5-325 MG tablet Take 1 tablet by mouth every 6 hours as needed for pain, Disp-10 tablet, R-0, E-Prescribe           Final diagnoses:   Hip pain, left     39-year-old female presents to the urgent care with concern over left hip pain after she attempted to stand up from a seated position earlier today.  She has stable vital signs upon arrival.  Physical exam findings as described above.  As part of evaluation she did have x-ray of her left hip which did not demonstrate any acute bony abnormality.  There was a sharply circumscribed area of increased density in the lateral aspect of the left femoral neck which was unchanged from prior imaging in 2016, consistent with benign lesion.  Strain, spasm, left hip pain.  I do not suspect intra-abdominal etiology.  No concern for knee etiology.  She was given a single tablet of Norco in the department with good symptomatic improvement.  She was discharged home stable with instructions for continued symptom and treatment with rest, ice, ibuprofen.  Follow-up with primary care provider or sports medicine if no improvement within the next 5-7 days.  Worrisome reasons to return to ER/UC sooner discussed.     Disclaimer: This note consists of symbols derived from keyboarding, dictation, and/or voice recognition software. As a result, there may be errors in the script that have gone undetected.  Please consider this when interpreting information  found in the chart.    12/9/2019   [facility] EMERGENCY DEPARTMENT     Purvi Gonzalez PA-C  12/15/19 1912

## 2019-12-09 NOTE — ED AVS SNAPSHOT
[facility] Emergency Department  [address] [address]  Phone:  [phone]  Fax:  [phone]                                    Summer Smith   MRN: [id number]    Department:  [facility] Emergency Department   Date of Visit:  12/9/2019           After Visit Summary Signature Page    I have received my discharge instructions, and my questions have been answered. I have discussed any challenges I see with this plan with the nurse or doctor.    ..........................................................................................................................................  Patient/Patient Representative Signature      ..........................................................................................................................................  Patient Representative Print Name and Relationship to Patient    ..................................................               ................................................  Date                                   Time    ..........................................................................................................................................  Reviewed by Signature/Title    ...................................................              ..............................................  Date                                               Time          22EPIC Rev 08/18

## 2019-12-13 ENCOUNTER — OFFICE VISIT (OUTPATIENT)
Dept: FAMILY MEDICINE | Facility: CLINIC | Age: 39
End: 2019-12-13
Payer: COMMERCIAL

## 2019-12-13 VITALS
TEMPERATURE: 97.3 F | HEART RATE: 70 BPM | BODY MASS INDEX: 47.09 KG/M2 | WEIGHT: 293 LBS | DIASTOLIC BLOOD PRESSURE: 88 MMHG | OXYGEN SATURATION: 98 % | HEIGHT: 66 IN | RESPIRATION RATE: 18 BRPM | SYSTOLIC BLOOD PRESSURE: 130 MMHG

## 2019-12-13 DIAGNOSIS — G57.02 PIRIFORMIS SYNDROME, LEFT: Primary | ICD-10-CM

## 2019-12-13 DIAGNOSIS — E66.01 OBESITY, CLASS III, BMI 40-49.9 (MORBID OBESITY) (H): ICD-10-CM

## 2019-12-13 DIAGNOSIS — M53.3 SI (SACROILIAC) JOINT DYSFUNCTION: ICD-10-CM

## 2019-12-13 DIAGNOSIS — J45.20 MILD INTERMITTENT REACTIVE AIRWAY DISEASE WITHOUT COMPLICATION: ICD-10-CM

## 2019-12-13 PROCEDURE — 99214 OFFICE O/P EST MOD 30 MIN: CPT | Performed by: NURSE PRACTITIONER

## 2019-12-13 RX ORDER — HYDROCODONE BITARTRATE AND ACETAMINOPHEN 5; 325 MG/1; MG/1
1 TABLET ORAL EVERY 6 HOURS PRN
Qty: 15 TABLET | Refills: 0 | Status: SHIPPED | OUTPATIENT
Start: 2019-12-13 | End: 2020-01-24

## 2019-12-13 RX ORDER — KETOROLAC TROMETHAMINE 10 MG/1
10 TABLET, FILM COATED ORAL EVERY 6 HOURS PRN
Qty: 20 TABLET | Refills: 0 | Status: SHIPPED | OUTPATIENT
Start: 2019-12-13 | End: 2020-01-24

## 2019-12-13 RX ORDER — PREDNISONE 20 MG/1
40 TABLET ORAL DAILY
COMMUNITY
End: 2020-01-24

## 2019-12-13 RX ORDER — CYCLOBENZAPRINE HCL 10 MG
10 TABLET ORAL
Qty: 30 TABLET | Refills: 1 | Status: SHIPPED | OUTPATIENT
Start: 2019-12-13 | End: 2020-01-24

## 2019-12-13 ASSESSMENT — ASTHMA QUESTIONNAIRES
QUESTION_1 LAST FOUR WEEKS HOW MUCH OF THE TIME DID YOUR ASTHMA KEEP YOU FROM GETTING AS MUCH DONE AT WORK, SCHOOL OR AT HOME: NONE OF THE TIME
QUESTION_3 LAST FOUR WEEKS HOW OFTEN DID YOUR ASTHMA SYMPTOMS (WHEEZING, COUGHING, SHORTNESS OF BREATH, CHEST TIGHTNESS OR PAIN) WAKE YOU UP AT NIGHT OR EARLIER THAN USUAL IN THE MORNING: NOT AT ALL
ACT_TOTALSCORE: 23
QUESTION_2 LAST FOUR WEEKS HOW OFTEN HAVE YOU HAD SHORTNESS OF BREATH: ONCE OR TWICE A WEEK
QUESTION_5 LAST FOUR WEEKS HOW WOULD YOU RATE YOUR ASTHMA CONTROL: COMPLETELY CONTROLLED
QUESTION_4 LAST FOUR WEEKS HOW OFTEN HAVE YOU USED YOUR RESCUE INHALER OR NEBULIZER MEDICATION (SUCH AS ALBUTEROL): ONCE A WEEK OR LESS

## 2019-12-13 ASSESSMENT — MIFFLIN-ST. JEOR: SCORE: 2043.93

## 2019-12-13 NOTE — PATIENT INSTRUCTIONS
Pain is likely due to piriformis syndrome or and SI joint dysfunction.  Gentle stretching.  Toradol 10 mg every 6 hrs as needed for up to 5 days.    Norco every 6-8 hrs as needed for severe pain    Flexeril 10 mg at bedtime as needed for pain, do not take it with Norco at the same time

## 2019-12-13 NOTE — PROGRESS NOTES
"Subjective     Summer Smith is a 39 year old female who presents to clinic today for the following health issues:    HPI   ED/UC Followup:    Facility:  [facility] ED   Date of visit: 12/09/19  Reason for visit: Left Hip Pain  Current Status: Hip pain started on Monday went to ER feels as if she is moving better, feels as if the pain got worse       Joint Pain    Onset: Monday     Description:   Location: left hip, posterior, in buttock area  Character: Burning    Intensity: moderate- severe    Progression of Symptoms: worse    Accompanying Signs & Symptoms:  Other symptoms: none    History:   Previous similar pain: no       Precipitating factors:   Trauma or overuse: YES- started exercising daily for the past 3 weeks     Alleviating factors:  Improved by: heat, ice, massage and Norco at night (prescirbed at ER), patient took prednisone 40 mg Wednesday and Thursday (had laying at home). States she is usually getting generalized edema from Prednisone, after she took 40 mg yesterday her fingers got swollen.     Therapies Tried and outcome: no           Reviewed and updated as needed this visit by Provider  Problems         Review of Systems   ROS COMP: Constitutional, HEENT, cardiovascular, pulmonary, gi and gu systems are negative, except as otherwise noted.      Objective    /88   Pulse 70   Temp 97.3  F (36.3  C) (Tympanic)   Resp 18   Ht 1.676 m (5' 6\")   Wt 134.7 kg (297 lb)   LMP 08/01/2016   SpO2 98%   BMI 47.94 kg/m    Body mass index is 47.94 kg/m .  Physical Exam   GENERAL: healthy, alert and no distress  MS: tenderness to palpation over the left buttock area and left SI joint, lumbar spine non-tender, negative straight leg test on the left, no edema noted, anterior left hip area non-tender, hip ROM are limited due to pain.   SKIN: no suspicious lesions or rashes  NEURO: Normal strength and tone, mentation intact and speech normal  PSYCH: mentation appears normal, affect " normal/bright    Diagnostic Test Results:  Labs reviewed in Epic        Assessment & Plan     1. Piriformis syndrome, left  -symptoms consistent with possible piriformis syndrome aggravated by start of new extensive exercise program. Pain worse with sitting and prolong walking.  -reviewed recent Xray results, there is a small lesion in the lateral aspect of the left femoral neck which is unchanged since prior study. I don't think that this lesion is the cause of patient current symptoms.      IMPRESSION: No acute bony abnormality. There is a 3 x 1.5 cm sharply  circumscribed focus of increased bony density involving the lateral  aspect of the left femoral neck. This lesion was present and appears  unchanged in size since CT exam of 11/15/2016. The CT showed that it  has a more lucent center. 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 No other bony abnormalities. Left  iliac vein stent is noted.    -recommended gentle stretching exercises, will start physical therapy if no improvement     - ketorolac (TORADOL) 10 MG tablet; Take 1 tablet (10 mg) by mouth every 6 hours as needed for moderate pain  Dispense: 20 tablet; Refill: 0  - HYDROcodone-acetaminophen (NORCO) 5-325 MG tablet; Take 1 tablet by mouth every 6 hours as needed for pain  Dispense: 15 tablet; Refill: 0  - cyclobenzaprine (FLEXERIL) 10 MG tablet; Take 1 tablet (10 mg) by mouth nightly as needed for muscle spasms or other (pain)  Dispense: 30 tablet; Refill: 1    2. SI (sacroiliac) joint dysfunction  -plan as above     3. Obesity, Class III, BMI 40-49.9 (morbid obesity) (H)  -continue to follow healthy diet, exercise and work on weight loss     4. Mild intermittent reactive airway disease without complication  -well controlled, ACT score 23       See Patient Instructions    Return in about 2 weeks (around 12/27/2019), or if symptoms worsen or fail to improve.    KINGA Celis [facility]       Heroin dependence

## 2019-12-14 ASSESSMENT — ASTHMA QUESTIONNAIRES: ACT_TOTALSCORE: 23

## 2019-12-15 ASSESSMENT — ENCOUNTER SYMPTOMS
WHEEZING: 0
DIZZINESS: 0
FREQUENCY: 0
NAUSEA: 0
SHORTNESS OF BREATH: 0
HEADACHES: 0
COLOR CHANGE: 0
VOMITING: 0
ARTHRALGIAS: 1
BACK PAIN: 1
NUMBNESS: 0
CHILLS: 0
DIARRHEA: 0
HEMATURIA: 0
DYSURIA: 0
COUGH: 0
PALPITATIONS: 0
LIGHT-HEADEDNESS: 0
WOUND: 0
ABDOMINAL PAIN: 0
FEVER: 0
TREMORS: 0

## 2020-01-16 ENCOUNTER — HOSPITAL ENCOUNTER (INPATIENT)
Dept: INTENSIVE CARE | Facility: CLINIC | Age: 40
Discharge: HOME | End: 2020-01-17
Attending: INTERNAL MEDICINE | Admitting: INTERNAL MEDICINE
Payer: COMMERCIAL

## 2020-01-16 DIAGNOSIS — I26.99 PULMONARY EMBOLISM AND INFARCTION (H): ICD-10-CM

## 2020-01-16 LAB
APTT PPP: 41 SECONDS (ref 24–37)
ERYTHROCYTE [DISTWIDTH] IN BLOOD BY AUTOMATED COUNT: 12.2 % (ref 11–14.5)
HCT VFR BLD AUTO: 42.5 % (ref 35–47)
HGB BLD-MCNC: 13.6 G/DL (ref 12–16)
INR PPP: 1.03 (ref 0.9–1.1)
MCH RBC QN AUTO: 27.9 PG (ref 27–34)
MCHC RBC AUTO-ENTMCNC: 32 G/DL (ref 32–36)
MCV RBC AUTO: 87 FL (ref 80–100)
PLATELET # BLD AUTO: 244 THOU/UL (ref 140–440)
PMV BLD AUTO: 9.8 FL (ref 8.5–12.5)
RBC # BLD AUTO: 4.88 MILL/UL (ref 3.8–5.4)
UFH PPP CHRO-ACNC: 0.46 IU/ML (ref 0.3–0.7)
WBC: 8 THOU/UL (ref 4–11)

## 2020-01-17 LAB
ANION GAP SERPL CALCULATED.3IONS-SCNC: 9 MMOL/L (ref 5–18)
AORTIC ROOT: 2.7 CM
AORTIC VALVE MEAN VELOCITY: 94 CM/S
ASCENDING AORTA: 2.9 CM
AV DIMENSIONLESS INDEX VTI: 1
AV MEAN GRADIENT: 4 MMHG
AV PEAK GRADIENT: 8 MMHG
AV VALVE AREA: 3 CM2
AV VELOCITY RATIO: 1
BSA FOR ECHO PROCEDURE: 2.46 M2
BUN SERPL-MCNC: 8 MG/DL (ref 8–22)
CALCIUM SERPL-MCNC: 9.4 MG/DL (ref 8.5–10.5)
CHLORIDE BLD-SCNC: 107 MMOL/L (ref 98–107)
CO2 SERPL-SCNC: 23 MMOL/L (ref 22–31)
CREAT SERPL-MCNC: 0.69 MG/DL (ref 0.6–1.1)
CV BLOOD PRESSURE: ABNORMAL MMHG
CV ECHO HEIGHT: 66 IN
CV ECHO WEIGHT: 288 LBS
DOP CALC AO PEAK VEL: 141 CM/S
DOP CALC AO VTI: 32 CM
DOP CALC LVOT AREA: 3.14 CM2
DOP CALC LVOT DIAMETER: 2 CM
DOP CALC LVOT PEAK VEL: 137 CM/S
DOP CALC LVOT STROKE VOLUME: 96.7 CM3
DOP CALCLVOT PEAK VEL VTI: 30.8 CM
EJECTION FRACTION: 66 % (ref 55–75)
ERYTHROCYTE [DISTWIDTH] IN BLOOD BY AUTOMATED COUNT: 12.2 % (ref 11–14.5)
FRACTIONAL SHORTENING: 37.8 % (ref 28–44)
GFR SERPL CREATININE-BSD FRML MDRD: >60 ML/MIN/1.73M2
GLUCOSE BLD-MCNC: 99 MG/DL (ref 70–125)
HCT VFR BLD AUTO: 40.5 % (ref 35–47)
HGB BLD-MCNC: 13 G/DL (ref 12–16)
INTERVENTRICULAR SEPTUM IN END DIASTOLE: 1 CM (ref 0.6–0.9)
IVS/PW RATIO: 1.1
LA AREA 1: 21 CM2
LA AREA 2: 24.2 CM2
LEFT ATRIUM LENGTH: 5.73 CM
LEFT ATRIUM SIZE: 4 CM
LEFT ATRIUM TO AORTIC ROOT RATIO: 1.48 NO UNITS
LEFT ATRIUM VOLUME INDEX: 30.6 ML/M2
LEFT ATRIUM VOLUME: 75.4 ML
LEFT VENTRICLE CARDIAC INDEX: 2.3 L/MIN/M2
LEFT VENTRICLE CARDIAC OUTPUT: 5.6 L/MIN
LEFT VENTRICLE DIASTOLIC VOLUME INDEX: 44.3 CM3/M2 (ref 29–61)
LEFT VENTRICLE DIASTOLIC VOLUME: 109 CM3 (ref 46–106)
LEFT VENTRICLE HEART RATE: 58 BPM
LEFT VENTRICLE MASS INDEX: 58.1 G/M2
LEFT VENTRICLE SYSTOLIC VOLUME INDEX: 15 CM3/M2 (ref 8–24)
LEFT VENTRICLE SYSTOLIC VOLUME: 37 CM3 (ref 14–42)
LEFT VENTRICULAR INTERNAL DIMENSION IN DIASTOLE: 4.5 CM (ref 3.8–5.2)
LEFT VENTRICULAR INTERNAL DIMENSION IN SYSTOLE: 2.8 CM (ref 2.2–3.5)
LEFT VENTRICULAR MASS: 142.9 G
LEFT VENTRICULAR OUTFLOW TRACT MEAN GRADIENT: 4 MMHG
LEFT VENTRICULAR OUTFLOW TRACT MEAN VELOCITY: 84.9 CM/S
LEFT VENTRICULAR OUTFLOW TRACT PEAK GRADIENT: 8 MMHG
LEFT VENTRICULAR POSTERIOR WALL IN END DIASTOLE: 0.9 CM (ref 0.6–0.9)
LV STROKE VOLUME INDEX: 39.3 ML/M2
MCH RBC QN AUTO: 27.9 PG (ref 27–34)
MCHC RBC AUTO-ENTMCNC: 32.1 G/DL (ref 32–36)
MCV RBC AUTO: 87 FL (ref 80–100)
MITRAL VALVE E/A RATIO: 1.5
MV AVERAGE E/E' RATIO: 9.8 CM/S
MV DECELERATION TIME: 251 MS
MV E'TISSUE VEL-LAT: 12.4 CM/S
MV E'TISSUE VEL-MED: 8.97 CM/S
MV LATERAL E/E' RATIO: 8.5
MV MEDIAL E/E' RATIO: 11.7
MV PEAK A VELOCITY: 68.6 CM/S
MV PEAK E VELOCITY: 105 CM/S
NUC REST DIASTOLIC VOLUME INDEX: 4600 LBS
NUC REST SYSTOLIC VOLUME INDEX: 66 IN
PLATELET # BLD AUTO: 258 THOU/UL (ref 140–440)
PMV BLD AUTO: 9.7 FL (ref 8.5–12.5)
POTASSIUM BLD-SCNC: 4.1 MMOL/L (ref 3.5–5)
PR MAX PG: 4 MMHG
PR PEAK VELOCITY: 96 CM/S
RBC # BLD AUTO: 4.66 MILL/UL (ref 3.8–5.4)
SODIUM SERPL-SCNC: 139 MMOL/L (ref 136–145)
TRICUSPID VALVE ANULAR PLANE SYSTOLIC EXCURSION: 2 CM
UFH PPP CHRO-ACNC: 0.41 IU/ML (ref 0.3–0.7)
WBC: 5.6 THOU/UL (ref 4–11)

## 2020-01-17 ASSESSMENT — MIFFLIN-ST. JEOR: SCORE: 1995.84

## 2020-01-23 NOTE — PROGRESS NOTES
Subjective     Summer Smith is a 39 year old female who presents to clinic today for the following health issues:    HPI     Chief Complaint   Patient presents with     Hospital F/U     felt as if she was improving until 1/22 when she developed a fever, cough, and SOB.      Hospital Follow-up Visit:    Hospital/Nursing Home/IP Rehab Facility: [facility] Cardiac/Telemetry  Date of Admission: 1/16/2020  Date of Discharge: 1/17/2020  Reason(s) for Admission: Pulmonary embolism and infarction (H) (Primary Dx)   Current status: Per patient, feeling 60% improved. Intermittent low grade fevers since Wednesday, 1/22/2020 with dizziness, cough, and shortness of breath.         Problems taking medications regularly:  None       Medication changes since discharge: YES-started on prednisone and eliquis.        Problems adhering to non-medication therapy:  None    Summary of hospitalization:  CareEverywhere information obtained and reviewed    Summer was admitted with SOB found to be due to bilateral PE.  TTE was stable.  She was treated with heparin then Eliquis.     She had PE in 2007 that was unprovoked and another in 2016 that was considered provoked due to surgery.      Diagnostic Tests/Treatments reviewed.  Follow up needed: none  Other Healthcare Providers Involved in Patient s Care:         None  Update since discharge: fluctuating course.     Summer reports that she was feeling much better earlier this week, but then a couple of days developed subjective fever, body aches, night sweats.  She's been taking Tylenol.  She started some cough this morning and having some chest heaviness.  Felt pre-syncopal so she drank a bunch of water but that hasn't helped.  No sore throat, sinus pain, ear pain.  No known exposure to influenza.      Breathing and chest pain have improved though.      She would also like to have a TSH checked- has been having a lot of weight gain.       Post Discharge Medication  Reconciliation: discharge medications reconciled, continue medications without change.  Plan of care communicated with patient     Coding guidelines for this visit:  Type of Medical   Decision Making Face-to-Face Visit       within 7 Days of discharge Face-to-Face Visit        within 14 days of discharge   Moderate Complexity [id number]   High Complexity [id number]              Patient Active Problem List   Diagnosis     Personal history of DVT (deep vein thrombosis)     Recurrent pulmonary emboli (H)     May-Thurner syndrome     Mild intermittent reactive airway disease without complication     Varicose veins of legs     Postoperative hemorrhage involving genitourinary system following genitourinary procedure     Morbid obesity due to excess calories (H)     History of hysterectomy     Pericardial cyst     Left leg swelling     SOB (shortness of breath)     History of pulmonary embolism     Past Surgical History:   Procedure Laterality Date     AS LIGATN SHORT SAPHKAMERON      had great saphenous vein removal     DAVINCI HYSTERECTOMY TOTAL      tubes removed, ovaries in place     STENT      venous in leg       Social History     Tobacco Use     Smoking status: Never Smoker     Smokeless tobacco: Never Used   Substance Use Topics     Alcohol use: Yes     Comment: rare     Family History   Problem Relation Age of Onset     Diabetes Father      Hypertension Father      Hyperlipidemia Father      Colon Cancer Maternal Grandfather      Liver Cancer Maternal Grandfather      Cancer Paternal Grandfather      Thyroid Disease Mother      Thyroid Disease Sister      Thyroid Disease Sister      Endocrine Disease Daughter         Lucille, has appointment end of July 2016         Current Outpatient Medications   Medication Sig Dispense Refill     albuterol (PROAIR HFA/PROVENTIL HFA/VENTOLIN HFA) 108 (90 BASE) MCG/ACT Inhaler Inhale 2 puffs into the lungs every 4 hours as needed 1 Inhaler 3     apixaban ANTICOAGULANT (ELIQUIS) 5 MG  "tablet Take 1 tablet (5 mg) by mouth 2 times daily 180 tablet 3     CALCIUM-VITAMIN D PO Take 1 tablet by mouth daily        Cetirizine HCl (ZYRTEC ALLERGY PO)        fluticasone (FLONASE) 50 MCG/ACT nasal spray Spray 1-2 sprays into both nostrils daily 16 g 0     order for DME Equipment being ordered: thigh high compression socks 25-35 mm Hg.  Please measure 1 each 11     vitamin D3 (CHOLECALCIFEROL) 2000 units (50 mcg) tablet Take 2 tablets by mouth daily       LEVOCARNITINE PO Take 1,000 mg by mouth 2 times daily Over the counter       Allergies   Allergen Reactions     Sulfa Drugs Hives     Vitamin K Anaphylaxis     Vit K IV only     Penicillin G Rash     Amoxicillin-Pot Clavulanate Rash       Reviewed and updated as needed this visit by Provider         Review of Systems   ROS COMP: Constitutional, HEENT, cardiovascular, pulmonary systems are negative, except as otherwise noted.      Objective    /88   Pulse 77   Temp 98.3  F (36.8  C) (Tympanic)   Resp 20   Ht 1.676 m (5' 6\")   Wt 132.9 kg (293 lb)   LMP 08/01/2016   SpO2 99%   BMI 47.29 kg/m    Body mass index is 47.29 kg/m .  Physical Exam   GENERAL: healthy, alert and no distress  RESP: lungs clear to auscultation - no rales, rhonchi or wheezes  CV: regular rate and rhythm, normal S1 S2, no S3 or S4, no murmur, click or rub, no peripheral edema and peripheral pulses strong    Diagnostic Test Results:  Labs reviewed in Epic  Results for orders placed or performed in visit on 01/24/20 (from the past 24 hour(s))   Influenza A/B antigen   Result Value Ref Range    Influenza A/B Agn Specimen Nasal     Influenza A Negative NEG^Negative    Influenza B Negative NEG^Negative   TSH with free T4 reflex   Result Value Ref Range    TSH 3.40 0.40 - 4.00 mU/L           Assessment & Plan     1. Recurrent pulmonary emboli (H)    Summer is slowly improving s/p PE.  Agree with recommendation to continue anticoagulation life-long at this point since she has now " had 3 PEs.  Eliquis refilled.  Could potentially decreased to prophylactic dosing of 2.5mg BID after at least 6 months of therapy.     - apixaban ANTICOAGULANT (ELIQUIS) 5 MG tablet; Take 1 tablet (5 mg) by mouth 2 times daily  Dispense: 180 tablet; Refill: 3    2. Generalized body aches    A couple of days ago she developed subjective fever and some body aches, so recommended checking flu swab.  This was negative and she is currently afebrile, so did not prescribe Tamiflu.  Advised her to monitor for worsening symptoms.     - Influenza A/B antigen    3. Weight gain    TSH is normal, she states she is going to follow-up to discuss the weight gain further.    - TSH with free T4 reflex       Kalyan Hogan MD  [facility]

## 2020-01-24 ENCOUNTER — OFFICE VISIT (OUTPATIENT)
Dept: FAMILY MEDICINE | Facility: CLINIC | Age: 40
End: 2020-01-24
Payer: COMMERCIAL

## 2020-01-24 VITALS
TEMPERATURE: 98.3 F | OXYGEN SATURATION: 99 % | SYSTOLIC BLOOD PRESSURE: 122 MMHG | WEIGHT: 293 LBS | BODY MASS INDEX: 47.09 KG/M2 | DIASTOLIC BLOOD PRESSURE: 88 MMHG | HEART RATE: 77 BPM | RESPIRATION RATE: 20 BRPM | HEIGHT: 66 IN

## 2020-01-24 DIAGNOSIS — R63.5 WEIGHT GAIN: ICD-10-CM

## 2020-01-24 DIAGNOSIS — R52 GENERALIZED BODY ACHES: ICD-10-CM

## 2020-01-24 DIAGNOSIS — I26.99 RECURRENT PULMONARY EMBOLI (H): Primary | ICD-10-CM

## 2020-01-24 LAB
FLUAV+FLUBV AG SPEC QL: NEGATIVE
FLUAV+FLUBV AG SPEC QL: NEGATIVE
SPECIMEN SOURCE: NORMAL
TSH SERPL DL<=0.005 MIU/L-ACNC: 3.4 MU/L (ref 0.4–4)

## 2020-01-24 PROCEDURE — 84443 ASSAY THYROID STIM HORMONE: CPT | Performed by: INTERNAL MEDICINE

## 2020-01-24 PROCEDURE — 99214 OFFICE O/P EST MOD 30 MIN: CPT | Performed by: INTERNAL MEDICINE

## 2020-01-24 PROCEDURE — 36415 COLL VENOUS BLD VENIPUNCTURE: CPT | Performed by: INTERNAL MEDICINE

## 2020-01-24 PROCEDURE — 87804 INFLUENZA ASSAY W/OPTIC: CPT | Performed by: INTERNAL MEDICINE

## 2020-01-24 ASSESSMENT — PAIN SCALES - GENERAL: PAINLEVEL: NO PAIN (1)

## 2020-01-24 ASSESSMENT — MIFFLIN-ST. JEOR: SCORE: 2025.79

## 2020-01-24 NOTE — NURSING NOTE
"Chief Complaint   Patient presents with     Hospital F/U     felt as if she was improving until 1/22 when she developed a fever, cough, and SOB.        Initial /88   Pulse 77   Temp 98.3  F (36.8  C) (Tympanic)   Resp 20   Ht 1.676 m (5' 6\")   Wt 132.9 kg (293 lb)   LMP 08/01/2016   SpO2 99%   BMI 47.29 kg/m   Estimated body mass index is 47.29 kg/m  as calculated from the following:    Height as of this encounter: 1.676 m (5' 6\").    Weight as of this encounter: 132.9 kg (293 lb).    Medication Reconciliation: complete    Aaliyah Spivey CMA      "

## 2020-01-27 DIAGNOSIS — R19.7 DIARRHEA OF PRESUMED INFECTIOUS ORIGIN: ICD-10-CM

## 2020-01-27 LAB
C DIFF TOX B STL QL: NEGATIVE
SPECIMEN SOURCE: NORMAL

## 2020-01-27 PROCEDURE — 87506 IADNA-DNA/RNA PROBE TQ 6-11: CPT | Performed by: INTERNAL MEDICINE

## 2020-01-27 PROCEDURE — 87493 C DIFF AMPLIFIED PROBE: CPT | Performed by: INTERNAL MEDICINE

## 2020-03-10 ENCOUNTER — HEALTH MAINTENANCE LETTER (OUTPATIENT)
Age: 40
End: 2020-03-10

## 2020-03-20 ENCOUNTER — MYC MEDICAL ADVICE (OUTPATIENT)
Dept: FAMILY MEDICINE | Facility: CLINIC | Age: 40
End: 2020-03-20

## 2020-07-15 ENCOUNTER — OFFICE VISIT (OUTPATIENT)
Dept: FAMILY MEDICINE | Facility: CLINIC | Age: 40
End: 2020-07-15
Payer: COMMERCIAL

## 2020-07-15 VITALS
HEART RATE: 79 BPM | SYSTOLIC BLOOD PRESSURE: 128 MMHG | BODY MASS INDEX: 47.09 KG/M2 | DIASTOLIC BLOOD PRESSURE: 78 MMHG | HEIGHT: 66 IN | OXYGEN SATURATION: 98 % | RESPIRATION RATE: 16 BRPM | WEIGHT: 293 LBS | TEMPERATURE: 98.8 F

## 2020-07-15 DIAGNOSIS — M25.561 CHRONIC PAIN OF RIGHT KNEE: ICD-10-CM

## 2020-07-15 DIAGNOSIS — M79.604 BILATERAL LEG PAIN: Primary | ICD-10-CM

## 2020-07-15 DIAGNOSIS — M79.605 BILATERAL LEG PAIN: Primary | ICD-10-CM

## 2020-07-15 DIAGNOSIS — G89.29 CHRONIC PAIN OF RIGHT KNEE: ICD-10-CM

## 2020-07-15 DIAGNOSIS — F40.240 CLAUSTROPHOBIA: ICD-10-CM

## 2020-07-15 LAB
CRP SERPL-MCNC: 13.7 MG/L (ref 0–8)
ERYTHROCYTE [SEDIMENTATION RATE] IN BLOOD BY WESTERGREN METHOD: 12 MM/H (ref 0–20)

## 2020-07-15 PROCEDURE — 86140 C-REACTIVE PROTEIN: CPT | Performed by: INTERNAL MEDICINE

## 2020-07-15 PROCEDURE — 99214 OFFICE O/P EST MOD 30 MIN: CPT | Performed by: INTERNAL MEDICINE

## 2020-07-15 PROCEDURE — 85652 RBC SED RATE AUTOMATED: CPT | Performed by: INTERNAL MEDICINE

## 2020-07-15 PROCEDURE — 36415 COLL VENOUS BLD VENIPUNCTURE: CPT | Performed by: INTERNAL MEDICINE

## 2020-07-15 PROCEDURE — 86038 ANTINUCLEAR ANTIBODIES: CPT | Performed by: INTERNAL MEDICINE

## 2020-07-15 RX ORDER — LORAZEPAM 0.5 MG/1
TABLET ORAL
Qty: 2 TABLET | Refills: 0 | Status: SHIPPED | OUTPATIENT
Start: 2020-07-15 | End: 2020-08-05

## 2020-07-15 RX ORDER — CELECOXIB 100 MG/1
100 CAPSULE ORAL 2 TIMES DAILY PRN
Qty: 60 CAPSULE | Refills: 3 | Status: SHIPPED | OUTPATIENT
Start: 2020-07-15 | End: 2022-10-28

## 2020-07-15 ASSESSMENT — MIFFLIN-ST. JEOR: SCORE: 2039.4

## 2020-07-15 NOTE — PROGRESS NOTES
"Subjective     Summer Smith is a 39 year old female who presents to clinic today for the following health issues:    HPI       Legs and right knee Pain    Onset: ongoing for years but in the last 2 month it has gotten worse.    Description:   Location: Bilateral leg (groin down) and right knee.  Leg pain feels deep inside, feels like an intense ache  Character: Leg feel like a deep ache. The knee is a sharp pain and gives out when up or down stairs.  Trouble doing her daily activities at this point    Intensity: moderate    Progression of Symptoms: worse    Accompanying Signs & Symptoms:  Other symptoms: weakness of right knee and swelling of both legs.  Sometimes gets some midline low back pain for the past couple of years as well, but back pain isn't as bad as the leg pain  No arm pain  Knee grinds with stairs, no knee swelling or redness  She notes some intermittent left lateral thigh tingling- was previously treated for this with gabapentin with improvement, is no longer on med and symptoms currently present again    History:   Previous similar pain: YES    Mother has had high TERE and saw rheum without clear diagnosis, she is on Celebrex and serotonin specific reuptake inhibitor for fibromyalgia  Had R knee MRI 15 years ago that \"didn't look good\" though she doesn't recall the specifics    Precipitating factors:   Trauma or overuse: no   Pain gets worse with rest and sitting    Alleviating factors:  Improved by: ibuprofen, knee brace, and ices the knee.  Movement does improve the pain    Therapies Tried and outcome: ibuprofen, knee brace, and ices the knee- subsides the pain for a little bit.      2010  May  MR Lower Extremity5/18/2010  [facility]  Result Impression       1. Grade II-III chondromalacia patellae.   2. Mild edema at the femoral origin of the medial head of the gastrocnemius may be due to sprain/partial tear.      She did some PT      Patient Active Problem List   Diagnosis     Personal " history of DVT (deep vein thrombosis)     Recurrent pulmonary emboli (H)     May-Thurner syndrome     Mild intermittent reactive airway disease without complication     Varicose veins of legs     Postoperative hemorrhage involving genitourinary system following genitourinary procedure     Morbid obesity due to excess calories (H)     History of hysterectomy     Pericardial cyst     Left leg swelling     SOB (shortness of breath)     History of pulmonary embolism     Past Surgical History:   Procedure Laterality Date     AS LIGATN SHORT SAPHKAMERON      had great saphenous vein removal     DAVINCI HYSTERECTOMY TOTAL      tubes removed, ovaries in place     STENT      venous in leg       Social History     Tobacco Use     Smoking status: Never Smoker     Smokeless tobacco: Never Used   Substance Use Topics     Alcohol use: Yes     Comment: rare     Family History   Problem Relation Age of Onset     Diabetes Father      Hypertension Father      Hyperlipidemia Father      Colon Cancer Maternal Grandfather      Liver Cancer Maternal Grandfather      Cancer Paternal Grandfather      Thyroid Disease Mother      Thyroid Disease Sister      Thyroid Disease Sister      Endocrine Disease Daughter         Lucille, has appointment end of July 2016         Current Outpatient Medications   Medication Sig Dispense Refill     albuterol (PROAIR HFA/PROVENTIL HFA/VENTOLIN HFA) 108 (90 BASE) MCG/ACT Inhaler Inhale 2 puffs into the lungs every 4 hours as needed 1 Inhaler 3     apixaban ANTICOAGULANT (ELIQUIS) 5 MG tablet Take 1 tablet (5 mg) by mouth 2 times daily 180 tablet 3     CALCIUM-VITAMIN D PO Take 1 tablet by mouth daily        celecoxib (CELEBREX) 100 MG capsule Take 1 capsule (100 mg) by mouth 2 times daily as needed for moderate pain 60 capsule 3     LEVOCARNITINE PO Take 1,000 mg by mouth 2 times daily Over the counter       LORazepam (ATIVAN) 0.5 MG tablet Take 1 tablet 30-60 minutes before MRI, take 2nd dose if needed 2 tablet  "0     vitamin D3 (CHOLECALCIFEROL) 2000 units (50 mcg) tablet Take 2 tablets by mouth daily       Cetirizine HCl (ZYRTEC ALLERGY PO)        fluticasone (FLONASE) 50 MCG/ACT nasal spray Spray 1-2 sprays into both nostrils daily (Patient not taking: Reported on 7/15/2020) 16 g 0     order for DME Equipment being ordered: thigh high compression socks 25-35 mm Hg.  Please measure (Patient not taking: Reported on 7/15/2020) 1 each 11     Allergies   Allergen Reactions     Sulfa Drugs Hives     Vitamin K Anaphylaxis     Vit K IV only     Penicillin G Rash     Amoxicillin-Pot Clavulanate Rash       Reviewed and updated as needed this visit by Provider         Review of Systems   Constitutional, MSK systems are negative, except as otherwise noted.      Objective    /78   Pulse 79   Temp 98.8  F (37.1  C) (Tympanic)   Resp 16   Ht 1.676 m (5' 6\")   Wt 134.3 kg (296 lb)   LMP 08/01/2016   SpO2 98%   BMI 47.78 kg/m    Body mass index is 47.78 kg/m .  Physical Exam   GENERAL: healthy, alert and no distress  MS: no pain to palpation of thighs, notable no pain over area of trochanteric bursa.  No numbness to light touch  Right knee: pain to palpation distal to patella without swelling and no redness noted, no  joint laxity, no pain with varus or valgus stress test.  Does have some pain distal to patella with rotation of the lower leg           Assessment & Plan     1. Bilateral leg pain    Unclear etiology of the bilateral thigh pain.  She describes the pain as being deep inside, doesn't really seem in a particular nerve distribution.  She reports some paraesthesias in the lateral left thigh, which may be meralgia paresthetica, but that doesn't seem to explain this more diffuse pain.  She doesn't have pain over the trochanteric bursae on exam.  She doesn't have shoulder pain to suggest PMR and would be rather young for this- we checked ESR and CRP and CRP was only slightly elevated with normal ESR, so very unlikely " PMR with these results.  Her mother has fibromyalgia, but Summer reports no upper body pain, so this also seems unlikely.  Her mother has had elevated TERE, so we are checking this  We will get a lumbar spine MRI to look for any nerve impingement.  Discussed also EMG- may consider this next if rest of work-up is normal.  She is taking ibuprofen with some relief, but would like to try an prescription NSAID.  She is on Eliquis, so discussed risk of bleeding with NSAID on Eliquis- Celebrex may have somewhat lower risk.      - Anti Nuclear Stephanie IgG by IFA with Reflex  - ESR: Erythrocyte sedimentation rate  - CRP, inflammation  - MR Lumbar Spine w/o Contrast; Future  - celecoxib (CELEBREX) 100 MG capsule; Take 1 capsule (100 mg) by mouth 2 times daily as needed for moderate pain  Dispense: 60 capsule; Refill: 3    2. Chronic pain of right knee    Possible meniscus injury based on symptoms, will get MRI for further eval.     - MR Knee Right w/o Contrast; Future  - celecoxib (CELEBREX) 100 MG capsule; Take 1 capsule (100 mg) by mouth 2 times daily as needed for moderate pain  Dispense: 60 capsule; Refill: 3    3. Claustrophobia    - LORazepam (ATIVAN) 0.5 MG tablet; Take 1 tablet 30-60 minutes before MRI, take 2nd dose if needed  Dispense: 2 tablet; Refill: 0       Kalyan Hogan MD  [facility]

## 2020-07-16 LAB — ANA SER QL IF: NEGATIVE

## 2020-07-16 ASSESSMENT — ASTHMA QUESTIONNAIRES: ACT_TOTALSCORE: 24

## 2020-07-23 ENCOUNTER — HOSPITAL ENCOUNTER (OUTPATIENT)
Dept: MRI IMAGING | Facility: CLINIC | Age: 40
End: 2020-07-23
Attending: INTERNAL MEDICINE
Payer: COMMERCIAL

## 2020-07-23 DIAGNOSIS — M25.561 CHRONIC PAIN OF RIGHT KNEE: ICD-10-CM

## 2020-07-23 DIAGNOSIS — G89.29 CHRONIC PAIN OF RIGHT KNEE: ICD-10-CM

## 2020-07-23 DIAGNOSIS — M79.605 BILATERAL LEG PAIN: ICD-10-CM

## 2020-07-23 DIAGNOSIS — M79.604 BILATERAL LEG PAIN: ICD-10-CM

## 2020-07-23 PROCEDURE — 72148 MRI LUMBAR SPINE W/O DYE: CPT

## 2020-07-23 PROCEDURE — 73721 MRI JNT OF LWR EXTRE W/O DYE: CPT | Mod: RT

## 2020-07-24 NOTE — RESULT ENCOUNTER NOTE
Covering for primary/ordering provider    The MRI shows degeneration in the cartilage of the knee.  There is no troubles with the ligaments or the meniscus.  Recommend follow-up with primary care provider to determine the next step -possibly physical therapy?

## 2020-08-05 ENCOUNTER — OFFICE VISIT (OUTPATIENT)
Dept: FAMILY MEDICINE | Facility: CLINIC | Age: 40
End: 2020-08-05
Payer: COMMERCIAL

## 2020-08-05 VITALS
DIASTOLIC BLOOD PRESSURE: 88 MMHG | WEIGHT: 286.6 LBS | RESPIRATION RATE: 10 BRPM | HEART RATE: 75 BPM | HEIGHT: 66 IN | OXYGEN SATURATION: 98 % | BODY MASS INDEX: 46.06 KG/M2 | TEMPERATURE: 98.6 F | SYSTOLIC BLOOD PRESSURE: 124 MMHG

## 2020-08-05 DIAGNOSIS — R10.11 RUQ ABDOMINAL PAIN: Primary | ICD-10-CM

## 2020-08-05 DIAGNOSIS — R11.0 NAUSEA: ICD-10-CM

## 2020-08-05 LAB
ALBUMIN SERPL-MCNC: 3.6 G/DL (ref 3.4–5)
ALP SERPL-CCNC: 57 U/L (ref 40–150)
ALT SERPL W P-5'-P-CCNC: 33 U/L (ref 0–50)
ANION GAP SERPL CALCULATED.3IONS-SCNC: 3 MMOL/L (ref 3–14)
AST SERPL W P-5'-P-CCNC: 23 U/L (ref 0–45)
BASOPHILS # BLD AUTO: 0.1 10E9/L (ref 0–0.2)
BASOPHILS NFR BLD AUTO: 1.3 %
BILIRUB SERPL-MCNC: 0.3 MG/DL (ref 0.2–1.3)
BUN SERPL-MCNC: 10 MG/DL (ref 7–30)
CALCIUM SERPL-MCNC: 9.4 MG/DL (ref 8.5–10.1)
CHLORIDE SERPL-SCNC: 106 MMOL/L (ref 94–109)
CO2 SERPL-SCNC: 28 MMOL/L (ref 20–32)
CREAT SERPL-MCNC: 0.76 MG/DL (ref 0.52–1.04)
DIFFERENTIAL METHOD BLD: ABNORMAL
EOSINOPHIL # BLD AUTO: 0.2 10E9/L (ref 0–0.7)
EOSINOPHIL NFR BLD AUTO: 3 %
ERYTHROCYTE [DISTWIDTH] IN BLOOD BY AUTOMATED COUNT: 12.4 % (ref 10–15)
GFR SERPL CREATININE-BSD FRML MDRD: >90 ML/MIN/{1.73_M2}
GLUCOSE SERPL-MCNC: 90 MG/DL (ref 70–99)
HCT VFR BLD AUTO: 45 % (ref 35–47)
HGB BLD-MCNC: 14.9 G/DL (ref 11.7–15.7)
LIPASE SERPL-CCNC: 129 U/L (ref 73–393)
LYMPHOCYTES # BLD AUTO: 2.2 10E9/L (ref 0.8–5.3)
LYMPHOCYTES NFR BLD AUTO: 32.4 %
MCH RBC QN AUTO: 28.5 PG (ref 26.5–33)
MCHC RBC AUTO-ENTMCNC: 33.1 G/DL (ref 31.5–36.5)
MCV RBC AUTO: 86 FL (ref 78–100)
MONOCYTES # BLD AUTO: 0.7 10E9/L (ref 0–1.3)
MONOCYTES NFR BLD AUTO: 10.4 %
NEUTROPHILS # BLD AUTO: 3.5 10E9/L (ref 1.6–8.3)
NEUTROPHILS NFR BLD AUTO: 52.9 %
PLATELET # BLD AUTO: 256 10E9/L (ref 150–450)
POTASSIUM SERPL-SCNC: 4.6 MMOL/L (ref 3.4–5.3)
PROT SERPL-MCNC: 7.5 G/DL (ref 6.8–8.8)
RBC # BLD AUTO: 5.23 10E12/L (ref 3.8–5.2)
SODIUM SERPL-SCNC: 137 MMOL/L (ref 133–144)
WBC # BLD AUTO: 6.7 10E9/L (ref 4–11)

## 2020-08-05 PROCEDURE — 80053 COMPREHEN METABOLIC PANEL: CPT | Performed by: NURSE PRACTITIONER

## 2020-08-05 PROCEDURE — 83690 ASSAY OF LIPASE: CPT | Performed by: NURSE PRACTITIONER

## 2020-08-05 PROCEDURE — 99214 OFFICE O/P EST MOD 30 MIN: CPT | Performed by: NURSE PRACTITIONER

## 2020-08-05 PROCEDURE — 85025 COMPLETE CBC W/AUTO DIFF WBC: CPT | Performed by: NURSE PRACTITIONER

## 2020-08-05 PROCEDURE — 36415 COLL VENOUS BLD VENIPUNCTURE: CPT | Performed by: NURSE PRACTITIONER

## 2020-08-05 RX ORDER — ONDANSETRON 4 MG/1
4 TABLET, ORALLY DISINTEGRATING ORAL EVERY 8 HOURS PRN
Qty: 30 TABLET | Refills: 1 | Status: SHIPPED | OUTPATIENT
Start: 2020-08-05 | End: 2021-03-02

## 2020-08-05 ASSESSMENT — MIFFLIN-ST. JEOR: SCORE: 1996.76

## 2020-08-05 NOTE — NURSING NOTE
"Initial /88 (BP Location: Right arm, Patient Position: Sitting, Cuff Size: Adult Large)   Pulse 75   Temp 98.6  F (37  C) (Tympanic)   Resp 10   Ht 1.676 m (5' 6\")   Wt 130 kg (286 lb 9.6 oz)   LMP 08/01/2016   SpO2 98%   BMI 46.26 kg/m   Estimated body mass index is 46.26 kg/m  as calculated from the following:    Height as of this encounter: 1.676 m (5' 6\").    Weight as of this encounter: 130 kg (286 lb 9.6 oz). .      "

## 2020-08-05 NOTE — PROGRESS NOTES
Subjective     Summer Smith is a 39 year old female who presents to clinic today for the following health issues:    HPI       ABDOMINAL   PAIN     Onset: 11 days ago     Description:   Character: Stabbing and Intense Intermittent   Location: epigastric region right upper quadrant  Radiation: Back    Intensity: severe    Progression of Symptoms:  intermittent    Accompanying Signs & Symptoms:  Fever/Chills?: no   Gas/Bloating: YES  Nausea: YES  Vomitting: no   Diarrhea?: no loose stools   Constipation:no   Dysuria or Hematuria: no    History:   Trauma: no   Previous similar pain: YES- Has happened before and it goes away    Previous tests done: Checked gallbladder years ago and was normal     Precipitating factors:   Does the pain change with:     Food: YES- Worsens with food and pressure on abdomen      BM: no     Urination: no     Alleviating factors:  Na     Therapies Tried and outcome: Cut out as much fat as possible helps, down 10 lbs in the past week     LMP:  NA, Hysterectomy      Above HPI reviewed. Additionally, notes that RUQ/epigastric pain significantly worsens when she eats fatty foods. If she avoids, she has no or minimal pain. Pain is associated with nausea but no vomiting. No bowel changes. History of hysterectomy, no other abdominal surgeries. No fevers, chills or other ill symptoms. No heavy ETOH use.      Patient Active Problem List   Diagnosis     Personal history of DVT (deep vein thrombosis)     Recurrent pulmonary emboli (H)     May-Thurner syndrome     Mild intermittent reactive airway disease without complication     Varicose veins of legs     Postoperative hemorrhage involving genitourinary system following genitourinary procedure     Morbid obesity due to excess calories (H)     History of hysterectomy     Pericardial cyst     Left leg swelling     SOB (shortness of breath)     History of pulmonary embolism     Past Surgical History:   Procedure Laterality Date     AS LIGATN SHORT  SAPHEN      had great saphenous vein removal     DAVINCI HYSTERECTOMY TOTAL      tubes removed, ovaries in place     STENT      venous in leg       Social History     Tobacco Use     Smoking status: Never Smoker     Smokeless tobacco: Never Used   Substance Use Topics     Alcohol use: Yes     Comment: rare     Family History   Problem Relation Age of Onset     Diabetes Father      Hypertension Father      Hyperlipidemia Father      Colon Cancer Maternal Grandfather      Liver Cancer Maternal Grandfather      Cancer Paternal Grandfather      Thyroid Disease Mother      Thyroid Disease Sister      Thyroid Disease Sister      Endocrine Disease Daughter         Lucille, has appointment end of July 2016         Current Outpatient Medications   Medication Sig Dispense Refill     albuterol (PROAIR HFA/PROVENTIL HFA/VENTOLIN HFA) 108 (90 BASE) MCG/ACT Inhaler Inhale 2 puffs into the lungs every 4 hours as needed 1 Inhaler 3     apixaban ANTICOAGULANT (ELIQUIS) 5 MG tablet Take 1 tablet (5 mg) by mouth 2 times daily 180 tablet 3     CALCIUM-VITAMIN D PO Take 1 tablet by mouth daily        celecoxib (CELEBREX) 100 MG capsule Take 1 capsule (100 mg) by mouth 2 times daily as needed for moderate pain 60 capsule 3     Cetirizine HCl (ZYRTEC ALLERGY PO)        LEVOCARNITINE PO Take 1,000 mg by mouth 2 times daily Over the counter       ondansetron (ZOFRAN-ODT) 4 MG ODT tab Take 1 tablet (4 mg) by mouth every 8 hours as needed for nausea 30 tablet 1     vitamin D3 (CHOLECALCIFEROL) 2000 units (50 mcg) tablet Take 2 tablets by mouth daily       fluticasone (FLONASE) 50 MCG/ACT nasal spray Spray 1-2 sprays into both nostrils daily (Patient not taking: Reported on 7/15/2020) 16 g 0     order for DME Equipment being ordered: thigh high compression socks 25-35 mm Hg.  Please measure (Patient not taking: Reported on 7/15/2020) 1 each 11       Reviewed and updated as needed this visit by Provider         Review of Systems  "  Constitutional, HEENT, cardiovascular, pulmonary, gi and gu systems are negative, except as otherwise noted.      Objective    /88 (BP Location: Right arm, Patient Position: Sitting, Cuff Size: Adult Large)   Pulse 75   Temp 98.6  F (37  C) (Tympanic)   Resp 10   Ht 1.676 m (5' 6\")   Wt 130 kg (286 lb 9.6 oz)   LMP 08/01/2016   SpO2 98%   BMI 46.26 kg/m    Body mass index is 46.26 kg/m .  Physical Exam  Vitals signs and nursing note reviewed.   Constitutional:       Appearance: Normal appearance.   HENT:      Head: Normocephalic and atraumatic.      Mouth/Throat:      Mouth: Mucous membranes are moist.   Eyes:      Comments: Non-icteric   Neck:      Musculoskeletal: Neck supple.   Cardiovascular:      Rate and Rhythm: Normal rate and regular rhythm.      Pulses: Normal pulses.      Heart sounds: Normal heart sounds, S1 normal and S2 normal. Heart sounds not distant. No murmur. No friction rub. No gallop.    Pulmonary:      Effort: Pulmonary effort is normal.      Breath sounds: Normal breath sounds.   Abdominal:      General: Abdomen is flat. Bowel sounds are normal.      Palpations: Abdomen is soft.      Tenderness: There is abdominal tenderness (RUQ).      Comments: Positive Spicer's   Musculoskeletal:      Right lower leg: No edema.      Left lower leg: No edema.   Skin:     General: Skin is warm and dry.      Capillary Refill: Capillary refill takes less than 2 seconds.   Neurological:      General: No focal deficit present.      Mental Status: She is alert and oriented to person, place, and time.   Psychiatric:         Mood and Affect: Mood normal.         Behavior: Behavior normal.         Thought Content: Thought content normal.         Judgment: Judgment normal.          Diagnostic Test Results:  Labs reviewed in Epic  Results for orders placed or performed in visit on 08/05/20 (from the past 24 hour(s))   CBC with platelets differential   Result Value Ref Range    WBC 6.7 4.0 - 11.0 10e9/L " "   RBC Count 5.23 (H) 3.8 - 5.2 10e12/L    Hemoglobin 14.9 11.7 - 15.7 g/dL    Hematocrit 45.0 35.0 - 47.0 %    MCV 86 78 - 100 fl    MCH 28.5 26.5 - 33.0 pg    MCHC 33.1 31.5 - 36.5 g/dL    RDW 12.4 10.0 - 15.0 %    Platelet Count 256 150 - 450 10e9/L    % Neutrophils 52.9 %    % Lymphocytes 32.4 %    % Monocytes 10.4 %    % Eosinophils 3.0 %    % Basophils 1.3 %    Absolute Neutrophil 3.5 1.6 - 8.3 10e9/L    Absolute Lymphocytes 2.2 0.8 - 5.3 10e9/L    Absolute Monocytes 0.7 0.0 - 1.3 10e9/L    Absolute Eosinophils 0.2 0.0 - 0.7 10e9/L    Absolute Basophils 0.1 0.0 - 0.2 10e9/L    Diff Method Automated Method            Assessment & Plan     1. RUQ abdominal pain  Suspect cholelithiasis, biliary colic. No fever or leukocytosis to suggest cholecystitis. Will check labs today, ultrasound ordered. If cholelithiasis noted, will refer to general surgery for consult for cholecystectomy as she has been symptomatic.  - CBC with platelets differential  - Comprehensive metabolic panel  - Lipase  - US Abdomen Limited; Future    2. Nausea    - ondansetron (ZOFRAN-ODT) 4 MG ODT tab; Take 1 tablet (4 mg) by mouth every 8 hours as needed for nausea  Dispense: 30 tablet; Refill: 1     BMI:   Estimated body mass index is 46.26 kg/m  as calculated from the following:    Height as of this encounter: 1.676 m (5' 6\").    Weight as of this encounter: 130 kg (286 lb 9.6 oz).         Patient Instructions   Labs today.  Schedule ultrasound, and I will call you when I have this result.        Return in about 1 week (around 8/12/2020) for worsening or continued symptoms.    KINGA [facility]    "

## 2020-08-09 ENCOUNTER — HOSPITAL ENCOUNTER (OUTPATIENT)
Dept: ULTRASOUND IMAGING | Facility: CLINIC | Age: 40
Discharge: HOME | End: 2020-08-09
Attending: NURSE PRACTITIONER | Admitting: NURSE PRACTITIONER
Payer: COMMERCIAL

## 2020-08-09 DIAGNOSIS — R10.11 RUQ ABDOMINAL PAIN: ICD-10-CM

## 2020-08-09 PROCEDURE — 76705 ECHO EXAM OF ABDOMEN: CPT

## 2020-08-10 ENCOUNTER — HOSPITAL ENCOUNTER (OUTPATIENT)
Dept: PHYSICAL THERAPY | Facility: CLINIC | Age: 40
Setting detail: THERAPIES SERIES
End: 2020-08-10
Attending: INTERNAL MEDICINE
Payer: COMMERCIAL

## 2020-08-10 DIAGNOSIS — M25.561 CHRONIC PAIN OF RIGHT KNEE: ICD-10-CM

## 2020-08-10 DIAGNOSIS — G89.29 CHRONIC PAIN OF RIGHT KNEE: ICD-10-CM

## 2020-08-10 PROCEDURE — 97110 THERAPEUTIC EXERCISES: CPT | Mod: GP | Performed by: PHYSICAL THERAPIST

## 2020-08-10 PROCEDURE — 97161 PT EVAL LOW COMPLEX 20 MIN: CPT | Mod: GP | Performed by: PHYSICAL THERAPIST

## 2020-08-10 PROCEDURE — 97535 SELF CARE MNGMENT TRAINING: CPT | Mod: GP | Performed by: PHYSICAL THERAPIST

## 2020-08-10 NOTE — PROGRESS NOTES
08/10/20 1600   General Information   Type of Visit Initial OP Ortho PT Evaluation   Start of Care Date 08/10/20   Referring Physician Kalyan Hogan    Patient/Family Goals Statement Stairs, Down > Up more painful and gives out, Prolonged standing.    Orders Evaluate and Treat   Date of Order 07/28/20   Certification Required? No  (BCBS - Auth'd)   Medical Diagnosis Chronic R knee pain    Surgical/Medical history reviewed   (Asthma, Possible FMS, Hysterectomy, Venous stent in pelvis. )   Precautions/Limitations no known precautions/limitations   Special Instructions Started Celebrex about 7/28/20, which has helped w/ knee pain and FMS.    Body Part(s)   Body Part(s) Knee   Presentation and Etiology   Pertinent history of current problem (include personal factors and/or comorbidities that impact the POC) Startedd to have pain 8 years ago, had MRI, which showed torn meniscus, but was not evident on recent MRI. Did PT to strengthen. It was good for a long time, but last 6 months, is having pain w/ stairs, and gives out w/stairs or turning w/ planted foot. Recent MRI, showed more PF problems.    Impairments A. Pain;B. Decreased WB tolerance;C. Swelling;F. Decreased strength and endurance;H. Impaired gait;J. Burning   Functional Limitations perform required work activities;perform desired leisure / sports activities   Symptom Location Pain anterior knee just below and on knee cap. Gives out.    How/Where did it occur From insidious onset   Onset date of current episode/exacerbation 07/28/20  (MD Visit)   Chronicity Chronic   Pain rating (0-10 point scale)   (2-6/10)   Pain quality A. Sharp;C. Aching;D. Burning   Frequency of pain/symptoms B. Intermittent   Pain/symptoms are: The same all the time   Pain/symptoms exacerbated by B. Walking;C. Lifting;M. Other   Pain exacerbation comment Stairs    Pain/symptoms eased by C. Rest;H. Cold;I. OTC medication(s)   Progression of symptoms since onset: Improved   Current /  Previous Interventions   Diagnostic Tests: MRI   MRI Results Results   MRI results PF problems.    Prior Level of Function   Functional Level Prior Comment Independent w/ ADL's   Current Level of Function   Current Community Support Family/friend caregiver   Patient role/employment history A. Employed  (Nurse Practitioner. 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    Living environment House/[facility]   Home/community accessibility Stairs w/ railings.    Fall Risk Screen   Fall screen completed by PT   Have you fallen 2 or more times in the past year? No   Have you fallen and had an injury in the past year? No   Timed Up and Go score (seconds) No balance issues, walks quickly into dept.    Is patient a fall risk? No   Abuse Screen (yes response referral indicated)   Feels Unsafe at Home or Work/School no   Feels Threatened by Someone no   Does Anyone Try to Keep You From Having Contact with Others or Doing Things Outside Your Home? no   Physical Signs of Abuse Present no   System Outcome Measures   Outcome Measures   (LEFS  51/80 )   Functional Scales   Functional Scales OPTIMAL   Optimal (1=able to do without difficulty, 2=able to do with little difficulty, 3=able to do with moderate difficulty, 4=able to do with much difficulty, 5=unable to do, 9=NA) Activity 1;Activity 2;Activity 3   Activity 1 comment Stairs - A lot of difficyulty and painful   Activity 2 comment Prolonged Standing - Moderate difficulty.    Knee Objective Findings   Observation No acute distress.    Integumentary  Normal Temp, color.    Gait/Locomotion Limp on stairs. Slight Pain w/ WBing R.    Foot Position In Standing Slight Pronated Feet.    Knee ROM Comment In sitting R 114, L 120;    Knee/Hip Strength Comments R Hip Flex 5-, R Hip Abd 4+, Knee Ext 4, Knee Flex 4+, DF 5.    Knee Flexibility Comments HS's R -25, L -10. Gastroc Normal B,    Knee Special Test Comments Luis's +, Turner's Negative. Gas/Sol Negative.    Palpation R Patella  is more lateral vs. Left.    Accessory Motion/Joint Mobility R Patella Hypo    Planned Therapy Interventions   Planned Therapy Interventions Comment PTRX# [id number]   Taping, HEP for strength and flexibility.   Clinical Impression   Criteria for Skilled Therapeutic Interventions Met yes, treatment indicated   PT Diagnosis PFPS   Influenced by the following impairments Pain, P w/WBing, Loss of strength,    Functional limitations due to impairments Stairs, Prolonged standeing, recreational activities.    Clinical Presentation Evolving/Changing   Clinical Presentation Rationale LEFS, MMT, ROM, Flexibility    Clinical Decision Making (Complexity) Low complexity   Therapy Frequency 1 time/week   Predicted Duration of Therapy Intervention (days/wks) 4 weeks, then patient to start a new job and will be unable to come in.    Risk & Benefits of therapy have been explained Yes   Patient, Family & other staff in agreement with plan of care Yes   Clinical Impression Comments PFPS   Education Assessment   Preferred Learning Style Listening;Demonstration;Pictures/video   Barriers to Learning No barriers   ORTHO GOALS   PT Ortho Eval Goals 1;2;3   Ortho Goal 1   Goal Identifier 1   Goal Description STG: Pt will be able to do stairs w/ minimal difficuylty and less pain.    Target Date 09/11/20   Ortho Goal 2   Goal Identifier 2   Goal Description STG: Pt will be able to stand for an hour or more w/minimal difficulty.    Target Date 09/11/20   Ortho Goal 3   Goal Identifier 3   Goal Description LTG: Pt will be independent w/ HEP asnd self taping to manage sx's.    Target Date 09/11/20   Total Evaluation Time   PT Eval, Low Complexity Minutes (17855) 35   Concepcion Angel PT, [facility] (#[id number])  [facility]           [phone]  Fax          [phone]  Appt #      [phone]

## 2020-08-20 ENCOUNTER — HOSPITAL ENCOUNTER (OUTPATIENT)
Dept: PHYSICAL THERAPY | Facility: CLINIC | Age: 40
Setting detail: THERAPIES SERIES
End: 2020-08-20
Attending: INTERNAL MEDICINE
Payer: COMMERCIAL

## 2020-08-20 PROCEDURE — 97110 THERAPEUTIC EXERCISES: CPT | Mod: GP | Performed by: PHYSICAL THERAPIST

## 2020-08-27 ENCOUNTER — HOSPITAL ENCOUNTER (OUTPATIENT)
Dept: PHYSICAL THERAPY | Facility: CLINIC | Age: 40
Setting detail: THERAPIES SERIES
End: 2020-08-27
Attending: INTERNAL MEDICINE
Payer: COMMERCIAL

## 2020-08-27 PROCEDURE — 97110 THERAPEUTIC EXERCISES: CPT | Mod: GP | Performed by: PHYSICAL THERAPIST

## 2020-09-02 ENCOUNTER — ANCILLARY PROCEDURE (OUTPATIENT)
Dept: GENERAL RADIOLOGY | Facility: CLINIC | Age: 40
End: 2020-09-02
Attending: NURSE PRACTITIONER
Payer: COMMERCIAL

## 2020-09-02 ENCOUNTER — OFFICE VISIT (OUTPATIENT)
Dept: FAMILY MEDICINE | Facility: CLINIC | Age: 40
End: 2020-09-02
Payer: COMMERCIAL

## 2020-09-02 ENCOUNTER — HOSPITAL ENCOUNTER (OUTPATIENT)
Dept: PHYSICAL THERAPY | Facility: CLINIC | Age: 40
Setting detail: THERAPIES SERIES
End: 2020-09-02
Attending: INTERNAL MEDICINE
Payer: COMMERCIAL

## 2020-09-02 VITALS
OXYGEN SATURATION: 97 % | SYSTOLIC BLOOD PRESSURE: 126 MMHG | HEART RATE: 61 BPM | RESPIRATION RATE: 14 BRPM | TEMPERATURE: 97.8 F | WEIGHT: 285.8 LBS | HEIGHT: 66 IN | BODY MASS INDEX: 45.93 KG/M2 | DIASTOLIC BLOOD PRESSURE: 84 MMHG

## 2020-09-02 DIAGNOSIS — R22.31 NODULE OF FINGER OF RIGHT HAND: Primary | ICD-10-CM

## 2020-09-02 DIAGNOSIS — R22.31 NODULE OF FINGER OF RIGHT HAND: ICD-10-CM

## 2020-09-02 PROCEDURE — 73130 X-RAY EXAM OF HAND: CPT | Mod: RT

## 2020-09-02 PROCEDURE — 99214 OFFICE O/P EST MOD 30 MIN: CPT | Performed by: NURSE PRACTITIONER

## 2020-09-02 PROCEDURE — 97110 THERAPEUTIC EXERCISES: CPT | Mod: GP | Performed by: PHYSICAL THERAPIST

## 2020-09-02 ASSESSMENT — MIFFLIN-ST. JEOR: SCORE: 1988.13

## 2020-09-02 ASSESSMENT — ENCOUNTER SYMPTOMS
CONSTITUTIONAL NEGATIVE: 1
FEVER: 0

## 2020-09-02 NOTE — PROGRESS NOTES
"Subjective     Summer Smith is a 40 year old female who presents to clinic today for the following health issues:    HPI       Concern - mass  Onset: 1-2 months   Description: right index finger   Intensity: moderate  Progression of Symptoms:  Worsening. Also starting to affect her typing and work. Does come and go though.   Accompanying Signs & Symptoms: painful, mildly swollen.   Previous history of similar problem: hx of ganglion cyst on the right wrist  Precipitating factors:        Worsened by: typing, grasping.   Alleviating factors:        Improved by: none.   Therapies tried and outcome:  none . Already on a celebrex so did not try anything else.     Additional provider notes: Small nodule to palmar side base of index finger; more painful that usual lately. States she feels like it is coming to the surface more. Pain is starting to radiate around her finger.       Review of Systems   Constitutional: Negative.  Negative for fever.   Musculoskeletal:        Nodule at base of right index finger, painful        Past Medical History:   Diagnosis Date     DVT (deep vein thrombosis) in pregnancy 2007     Pulmonary embolism (H) 2007    no long term anticoagulation, no recurrence     Current Outpatient Medications   Medication     albuterol (PROAIR HFA/PROVENTIL HFA/VENTOLIN HFA) 108 (90 BASE) MCG/ACT Inhaler     apixaban ANTICOAGULANT (ELIQUIS) 5 MG tablet     CALCIUM-VITAMIN D PO     celecoxib (CELEBREX) 100 MG capsule     Cetirizine HCl (ZYRTEC ALLERGY PO)     LEVOCARNITINE PO     vitamin D3 (CHOLECALCIFEROL) 2000 units (50 mcg) tablet     fluticasone (FLONASE) 50 MCG/ACT nasal spray     ondansetron (ZOFRAN-ODT) 4 MG ODT tab     order for DME     No current facility-administered medications for this visit.            Objective    /84 (BP Location: Right arm, Patient Position: Sitting, Cuff Size: Adult Large)   Pulse 61   Temp 97.8  F (36.6  C) (Tympanic)   Resp 14   Ht 1.676 m (5' 6\")   Wt 129.6 kg " "(285 lb 12.8 oz)   LMP 08/01/2016   SpO2 97%   BMI 46.13 kg/m    Body mass index is 46.13 kg/m .  Physical Exam  Vitals signs and nursing note reviewed.   Constitutional:       General: She is not in acute distress.     Appearance: Normal appearance. She is not ill-appearing or toxic-appearing.   Musculoskeletal: Normal range of motion.      Comments: Small nodule to base of index finger, palmar side, tender to palpate   Skin:     General: Skin is warm and dry.   Neurological:      Mental Status: She is alert.                    Assessment & Plan     Nodule of finger of right hand  X-ray ordered. Unattached nodule noted on exam. Radiology read it as a negative exam. Discussed with patient and ortho referral placed.     - XR Hand Right G/E 3 Views; Future  - Orthopedic & Spine  Referral; Future     BMI:   Estimated body mass index is 46.13 kg/m  as calculated from the following:    Height as of this encounter: 1.676 m (5' 6\").    Weight as of this encounter: 129.6 kg (285 lb 12.8 oz).           Patient Instructions   X-ray came back. Will wait for radiology to do official read. I will call you with further recommendations based upon what they think it is.       Return if symptoms worsen or fail to improve.    KINGA [facility]    "

## 2020-09-02 NOTE — PATIENT INSTRUCTIONS
X-ray came back. Will wait for radiology to do official read. I will call you with further recommendations based upon what they think it is.

## 2020-09-10 NOTE — PROGRESS NOTES
ASSESSMENT & PLAN  Summer was seen today for cyst and pain.    Diagnoses and all orders for this visit:    Nodule of finger of right hand  -     Orthopedic & Spine  Referral      Patient is a 40 year old female presenting for evaluation of   Chief Complaint   Patient presents with     Right Hand - Cyst, Pain      #Right second digit sesamoid bone: Symptoms noted over the past 8 months worse with prolonged gripping/grasping particularly when she is driving.  Patient has focal tenderness to palpation over the nodule at the volar base of the second digit.  X-rays previously taken showing small sesamoid bone.  Ultrasound negative for ganglion cyst over this area.  Etiology of the pain likely pressure directly onto the sesamoid bone.  Counseled patient on nature of condition and treatment options.  Given this plan as below, follow-up as needed    Treatment: Recommend padded gloves while driving.  Can consider referral to hand surgeon if symptoms do not improve or worsen  Physical Therapy home exercises, if not improved can refer for formal PT  Injection none  Medications can consider referral to hand surgeon if symptoms do not improve evaluation.  Limited NSAIDs/Tylenol    Concerning signs/sx that would warrant urgent evaluation were discussed.  All questions were answered, patient understands and agrees with plan.      Return if symptoms worsen or fail to improve.    -----    SUBJECTIVE  Summer Smith is a/an 40 year old Right handed female who is seen in consultation at the request of  Kinsey Borrero C.N.P. for evaluation of right hand pain. The patient is seen by themselves.    Onset: 8 month(s) ago. Reports insidious onset without acute precipitating event.  First noted discomfort with grasping for driving.  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    Past Medical History:   Diagnosis Date     DVT (deep vein thrombosis) in pregnancy 2007     Pulmonary embolism (H) 2007    no long term anticoagulation, no recurrence     Social History     Socioeconomic History     Marital status:      Spouse name: Not on file     Number of children: Not on file     Years of education: Not on file     Highest education level: Not on file   Occupational History     Not on file   Social Needs     Financial resource strain: Not on file     Food insecurity     Worry: Not on file     Inability: Not on file     Transportation needs     Medical: Not on file     Non-medical: Not on file   Tobacco Use     Smoking status: Never Smoker     Smokeless tobacco: Never Used   Substance and Sexual Activity     Alcohol use: Yes     Comment: rare     Drug use: No     Sexual activity: Yes     Partners: Male     Birth control/protection: Male Surgical   Lifestyle     Physical activity     Days per week: Not on file     Minutes per session: Not on file     Stress: Not on file   Relationships     Social connections     Talks on phone: Not on file     Gets together: Not on file     Attends Taoist service: Not on file     Active member of club or organization: Not on file     Attends meetings of clubs or organizations: Not on file     Relationship status: Not on file     Intimate partner violence     Fear of current or ex partner: Not on file     Emotionally abused: Not on file     Physically abused: Not on file     Forced sexual activity: Not on file   Other Topics Concern     Parent/sibling w/ CABG, MI or angioplasty before 65F 55M? No   Social History Narrative     Not on file         Patient's past medical, surgical, social, and family histories were reviewed today and no  "changes are noted.  No family history pertinent to the patient's problem today    REVIEW OF SYSTEMS:  10 point ROS is negative other than symptoms noted above in HPI, Past Medical History or as stated below  Constitutional: NEGATIVE for fever, chills, change in weight  Skin: NEGATIVE for worrisome rashes, moles or lesions  GI/: NEGATIVE for bowel or bladder changes  Neuro: NEGATIVE for weakness, dizziness or paresthesias    OBJECTIVE:  /80   Ht 1.676 m (5' 6\")   Wt 129.3 kg (285 lb)   LMP 08/01/2016   BMI 46.00 kg/m     General: healthy, alert and in no distress  HEENT: no scleral icterus or conjunctival erythema  Skin: no suspicious lesions or rash. No jaundice.  CV: regular rhythm by palpation  Resp: normal respiratory effort without conversational dyspnea   Psych: normal mood and affect  Gait: normal steady gait with appropriate coordination and balance  Neuro: normal light touch sensory exam of the bilateral hands.    MSK:  RIGHT HAND  Inspection:    No swelling or obvious deformity or asymmetry  Palpation:   Carpals: normal   Metacarpals: 2nd metacarpal tenderness to palpation of the volar nodule   Thumb: normal   Fingers: normal  Range of Motion:    Full active flexion and extension at MCP, PIP, and DIP joints; normal finger cascade without malrotation.  Wrist pronation, supination, and ulnar/radial deviation normal.  Strength:     full, extension full, flexion full, abduction full, adduction full, opposition full  Special Tests:    Positive: none    Negative: flexor digitorum superficialis testing, flexor digitorum profundus testing    Independent visualization of the below image:  No results found for this or any previous visit (from the past 24 hour(s)).     XR HAND RT G/E 3 VW 9/2/2020 11:40 AM      HISTORY: small painful nodule to base of index finger, palmar side;  Nodule of finger of right hand                                                                      IMPRESSION: Negative " exam.     MIA LARSEN MD    I  visualized and reviewed these images with the patient      Devendra Velasquez MD [facility]

## 2020-09-11 ENCOUNTER — OFFICE VISIT (OUTPATIENT)
Dept: ORTHOPEDICS | Facility: CLINIC | Age: 40
End: 2020-09-11
Payer: COMMERCIAL

## 2020-09-11 VITALS
DIASTOLIC BLOOD PRESSURE: 80 MMHG | BODY MASS INDEX: 45.8 KG/M2 | WEIGHT: 285 LBS | HEIGHT: 66 IN | SYSTOLIC BLOOD PRESSURE: 125 MMHG

## 2020-09-11 DIAGNOSIS — R22.31 NODULE OF FINGER OF RIGHT HAND: ICD-10-CM

## 2020-09-11 PROCEDURE — 99214 OFFICE O/P EST MOD 30 MIN: CPT | Performed by: FAMILY MEDICINE

## 2020-09-11 ASSESSMENT — MIFFLIN-ST. JEOR: SCORE: 1984.5

## 2020-09-11 NOTE — LETTER
9/11/2020         RE: Summer Simth  [id number]  [facility] [address]        Dear Colleague,    Thank you for referring your patient, Summer Smith, to the [facility]. Please see a copy of my visit note below.    ASSESSMENT & PLAN  Summer was seen today for cyst and pain.    Diagnoses and all orders for this visit:    Nodule of finger of right hand  -     Orthopedic & Spine  Referral      Patient is a 40 year old female presenting for evaluation of   Chief Complaint   Patient presents with     Right Hand - Cyst, Pain      #Right second digit sesamoid bone: Symptoms noted over the past 8 months worse with prolonged gripping/grasping particularly when she is driving.  Patient has focal tenderness to palpation over the nodule at the volar base of the second digit.  X-rays previously taken showing small sesamoid bone.  Ultrasound negative for ganglion cyst over this area.  Etiology of the pain likely pressure directly onto the sesamoid bone.  Counseled patient on nature of condition and treatment options.  Given this plan as below, follow-up as needed    Treatment: Recommend padded gloves while driving.  Can consider referral to hand surgeon if symptoms do not improve or worsen  Physical Therapy home exercises, if not improved can refer for formal PT  Injection none  Medications can consider referral to hand surgeon if symptoms do not improve evaluation.  Limited NSAIDs/Tylenol    Concerning signs/sx that would warrant urgent evaluation were discussed.  All questions were answered, patient understands and agrees with plan.      Return if symptoms worsen or fail to improve.    -----    SUBJECTIVE  Summer Smith is a/an 40 year old Right handed female who is seen in consultation at the request of  Kinsey Borrero C.N.P. for evaluation of right hand pain. The patient is seen by themselves.    Onset: 8 month(s) ago. Reports insidious onset without acute  precipitating event.  First noted discomfort with grasping for driving.  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    Past Medical History:   Diagnosis Date     DVT (deep vein thrombosis) in pregnancy 2007     Pulmonary embolism (H) 2007    no long term anticoagulation, no recurrence     Social History     Socioeconomic History     Marital status:      Spouse name: Not on file     Number of children: Not on file     Years of education: Not on file     Highest education level: Not on file   Occupational History     Not on file   Social Needs     Financial resource strain: Not on file     Food insecurity     Worry: Not on file     Inability: Not on file     Transportation needs     Medical: Not on file     Non-medical: Not on file   Tobacco Use     Smoking status: Never Smoker     Smokeless tobacco: Never Used   Substance and Sexual Activity     Alcohol use: Yes     Comment: rare     Drug use: No     Sexual activity: Yes     Partners: Male     Birth control/protection: Male Surgical   Lifestyle     Physical activity     Days per week: Not on file     Minutes per session: Not on file     Stress: Not on file   Relationships     Social connections     Talks on phone: Not on file     Gets together: Not on file     Attends [employer] service: Not on file     Active member of club or organization: Not on file     Attends meetings of clubs or organizations: Not on file     Relationship status: Not on file     Intimate partner violence     Fear of current or ex partner: Not on file     Emotionally abused: Not on file     Physically abused: Not on file  "    Forced sexual activity: Not on file   Other Topics Concern     Parent/sibling w/ CABG, MI or angioplasty before 65F 55M? No   Social History Narrative     Not on file         Patient's past medical, surgical, social, and family histories were reviewed today and no changes are noted.  No family history pertinent to the patient's problem today    REVIEW OF SYSTEMS:  10 point ROS is negative other than symptoms noted above in HPI, Past Medical History or as stated below  Constitutional: NEGATIVE for fever, chills, change in weight  Skin: NEGATIVE for worrisome rashes, moles or lesions  GI/: NEGATIVE for bowel or bladder changes  Neuro: NEGATIVE for weakness, dizziness or paresthesias    OBJECTIVE:  /80   Ht 1.676 m (5' 6\")   Wt 129.3 kg (285 lb)   LMP 08/01/2016   BMI 46.00 kg/m     General: healthy, alert and in no distress  HEENT: no scleral icterus or conjunctival erythema  Skin: no suspicious lesions or rash. No jaundice.  CV: regular rhythm by palpation  Resp: normal respiratory effort without conversational dyspnea   Psych: normal mood and affect  Gait: normal steady gait with appropriate coordination and balance  Neuro: normal light touch sensory exam of the bilateral hands.    MSK:  RIGHT HAND  Inspection:    No swelling or obvious deformity or asymmetry  Palpation:   Carpals: normal   Metacarpals: 2nd metacarpal tenderness to palpation of the volar nodule   Thumb: normal   Fingers: normal  Range of Motion:    Full active flexion and extension at MCP, PIP, and DIP joints; normal finger cascade without malrotation.  Wrist pronation, supination, and ulnar/radial deviation normal.  Strength:     full, extension full, flexion full, abduction full, adduction full, opposition full  Special Tests:    Positive: none    Negative: flexor digitorum superficialis testing, flexor digitorum profundus testing    Independent visualization of the below image:  No results found for this or any previous visit " (from the past 24 hour(s)).     XR HAND RT G/E 3 VW 9/2/2020 11:40 AM      HISTORY: small painful nodule to base of index finger, palmar side;  Nodule of finger of right hand                                                                      IMPRESSION: Negative exam.     MIA LARSEN MD    I  visualized and reviewed these images with the patient      Devendra Velasquez MD [facility]        Again, thank you for allowing me to participate in the care of your patient.        Sincerely,        Devendra Velasquez MD

## 2020-09-11 NOTE — PATIENT INSTRUCTIONS
Diagnosis: Right 2nd Finger pain   Image Findings: accessory sesamoid bone  Treatment: padded gloves   Job: no restrictions  Medications: Limited tylenol/ibuprofen for pain for 1-2 weeks  Follow-up: As needed    Please call [phone]   Ask for my team if you have any questions or concerns    It was great seeing you today!    Devendra Velasquez

## 2020-12-24 NOTE — PROGRESS NOTES
Outpatient Physical Therapy Discharge Note     Patient: Summer Smith  : 1980    Beginning/End Dates of Reporting Period:  8/10/20 to 20 when last seen. Discharge written on 2020  Total # of Rx sessions: 4    Referring Provider: Kalyan Hogan    Therapy Diagnosis: Chronic R Knee Pain      Client Self Report: Job starts 20. Less Pain.  Only gave out once in last week.  Able to do stairs normall in am. Scheduled for another appointment, but cancelled d/t doing well.      Objective Measurements:  Objective Measure: LEFS   Details: 20  Score 72/880.  INITIALLY:  51/80     Objective Measure: MMT  Details: 20 Not Reassessed, INITIALLY: R Hip Flex 5-, R Hip Abd 4+, Knee Ext 4, Knee Flex 4+, DF 5.     Objective Measure: Flex/ Spec tests   Details: 20 Not reassessed, INITIALLY: HS's R -25, L -10;  Luis's +.      Goals:  Goal Identifier 1   Goal Description STG: Pt will be able to do stairs w/ minimal difficuylty and less pain. 20  MET    Target Date 20   Date Met  20   Progress:     Goal Identifier 2   Goal Description STG: Pt will be able to stand for an hour or more w/minimal difficulty.  20  MET    Target Date 20   Date Met  20   Progress:     Goal Identifier 3   Goal Description LTG: Pt will be independent w/ HEP asnd self taping to manage sx's.  20  MET    Target Date 20   Date Met  20   Progress:     Progress Toward Goals:   Progress this reporting period: Pt has met all goals.     Plan:  Discharge from therapy.    Discharge:    Reason for Discharge: Patient has met all goals.  Patient chooses to discontinue therapy.    Equipment Issued:      Discharge Plan: Patient to continue home program.  Pt to follow up w/MD as appropriate.   Conecpcion Angel, PT, [facility] (#[id number])  [facility]           [phone]  Fax          [phone]  Appt #      [phone]

## 2020-12-27 ENCOUNTER — HEALTH MAINTENANCE LETTER (OUTPATIENT)
Age: 40
End: 2020-12-27

## 2020-12-28 ENCOUNTER — E-VISIT (OUTPATIENT)
Dept: URGENT CARE | Facility: URGENT CARE | Age: 40
End: 2020-12-28
Payer: COMMERCIAL

## 2020-12-28 DIAGNOSIS — Z20.822 SUSPECTED COVID-19 VIRUS INFECTION: ICD-10-CM

## 2020-12-28 DIAGNOSIS — J02.9 SORE THROAT: ICD-10-CM

## 2020-12-28 PROCEDURE — 99421 OL DIG E/M SVC 5-10 MIN: CPT | Performed by: FAMILY MEDICINE

## 2020-12-28 NOTE — PATIENT INSTRUCTIONS
Dear Summer Smith,    Your symptoms show that you may have coronavirus (COVID-19). This illness can cause fever, cough and trouble breathing. Many people get a mild case and get better on their own. Some people can get very sick.    Because you also reported sore throat I would like to also test you for Strep Throat to determine if we need to treat you for that as well.    What should I do?  We would like to test you for Covid-19 virus and Strep Throat. I have placed orders for these tests.     For all employees or close contacts (except [place] and [employer] - see below), go to your [employer] home page and scroll down to the section that says  You have an appointment that needs to be scheduled  and click the large green button that says  Schedule Now  and follow the steps to find the next available opening.  It is important that when you are asked what the reason for your appointment is that you mention you need BOTH Covid and Strep tests.  tests.     If you are unable to complete these steps or if you cannot find any available times, please call [phone] to schedule employee testing.     [place] employees or close contacts, please call [phone].   [place] ([employer]) employees or close contacts call [phone].    Return to work/school/ guidance:  Please let your workplace manager and staffing office know when your quarantine ends     We can t give you an exact date as it depends on the above. You can calculate this on your own or work with your manager/staffing office to calculate this. (For example if you were exposed on 10/4, you would have to quarantine for 14 full days. That would be through 10/18. You could return on 10/19.)      If you receive a positive COVID-19 test result, follow the guidance of the those who are giving you the results. Usually the return to work is 10 (or in some cases 20 days from symptom onset.) If you work at [employer], you must also be cleared by  Employee Occupational Health and Safety to return to work.        If you receive a negative COVID-19 test result and did not have a high risk exposure to someone with a known positive COVID-19 test, you can return to work once you're free of fever for 24 hours without fever-reducing medication and your symptoms are improving or resolved.      If you receive a negative COVID-19 test and If you had a high risk exposure to someone who has tested positive for COVID-19 then you can return to work 14 days after your last contact with the positive individual    Note: If you have ongoing exposure to the covid positive person, this quarantine period may be more than 14 days. (For example, if you are continued to be exposed to your child who tested positive and cannot isolate from them, then the quarantine of 7-14 days can't start until your child is no longer contagious. This is typically 10 days from onset of the child's symptoms. So the total duration may be 17-24 days in this case.)    Sign up for [employer].   We know it's scary to hear that you might have COVID-19. We want to track your symptoms to make sure you're okay over the next 2 weeks. Please look for an email from [employer]--this is a free, online program that we'll use to keep in touch. To sign up, follow the link in the email you will receive. Learn more at [email or web address]    How can I take care of myself?    Get lots of rest. Drink extra fluids (unless a doctor has told you not to)    Take Tylenol (acetaminophen) or ibuprofen for fever or pain. If you have liver or kidney problems, ask your family doctor if it's okay to take Tylenol o ibuprofen    If you have other health problems (like cancer, heart failure, an organ transplant or severe kidney disease): Call your specialty clinic if you don't feel better in the next 2 days.    Know when to call 911. Emergency warning signs include:  o Trouble breathing or shortness of breath  o Pain  or pressure in the chest that doesn't go away  o Feeling confused like you haven't felt before, or not being able to wake up  o Bluish-colored lips or face    Where can I get more information?  [facility] - About COVID-19:   [email or web address]    CDC - What to Do If You're Sick:   www.cdc.gov/coronavirus/2019-ncov/about/steps-when-sick.html      Dear Summer Smith,    Your symptoms show that you may have coronavirus (COVID-19). This illness can cause fever, cough and trouble breathing. Many people get a mild case and get better on their own. Some people can get very sick.    Because you also reported sore throat I would like to also test you for Strep Throat to determine if we need to treat you for that as well.    What should I do?  We would like to test you for Covid-19 virus and Strep Throat. I have placed orders for these tests.   To schedule: go to your [employer] home page and scroll down to the section that says  You have an appointment that needs to be scheduled  and click the large green button that says  Schedule Now  and follow the steps to find the next available openings. It is important that when you are asked what the reason for your appointment is that you mention you need BOTH Covid and Strep tests.    If you are unable to complete these [employer] scheduling steps, please call [phone] to schedule your testing.     Return to work/school/ guidance:   Please let your workplace manager and staffing office know when your quarantine ends     We can t give you an exact date as it depends on the above. You can calculate this on your own or work with your manager/staffing office to calculate this. (For example if you were exposed on 10/4, you would have to quarantine for 14 full days. That would be through 10/18. You could return on 10/19.)      If you receive a positive COVID-19 test result, follow the guidance of the those who are giving you the results. Usually the return  to work is 10 (or in some cases 20 days from symptom onset.) If you work at [employer], you must also be cleared by Employee Occupational Health and Safety to return to work.        If you receive a negative COVID-19 test result and did not have a high risk exposure to someone with a known positive COVID-19 test, you can return to work once you're free of fever for 24 hours without fever-reducing medication and your symptoms are improving or resolved.      If you receive a negative COVID-19 test and If you had a high risk exposure to someone who has tested positive for COVID-19 then you can return to work 14 days after your last contact with the positive individual    Note: If you have ongoing exposure to the covid positive person, this quarantine period may be more than 14 days. (For example, if you are continued to be exposed to your child who tested positive and cannot isolate from them, then the quarantine of 7-14 days can't start until your child is no longer contagious. This is typically 10 days from onset of the child's symptoms. So the total duration may be 17-24 days in this case.)    Sign up for [employer].   We know it's scary to hear that you might have COVID-19. We want to track your symptoms to make sure you're okay over the next 2 weeks. Please look for an email from [employer]--this is a free, online program that we'll use to keep in touch. To sign up, follow the link in the email you will receive. Learn more at [email or web address]    How can I take care of myself?    Get lots of rest. Drink extra fluids (unless a doctor has told you not to)    Take Tylenol (acetaminophen) or ibuprofen for fever or pain. If you have liver or kidney problems, ask your family doctor if it's okay to take Tylenol o ibuprofen    If you have other health problems (like cancer, heart failure, an organ transplant or severe kidney disease): Call your specialty clinic if you don't feel better in the  next 2 days.    Know when to call 911. Emergency warning signs include:  o Trouble breathing or shortness of breath  o Pain or pressure in the chest that doesn't go away  o Feeling confused like you haven't felt before, or not being able to wake up  o Bluish-colored lips or face    Where can I get more information?  [facility] - About COVID-19:   [email or web address]    CDC - What to Do If You're Sick:   www.cdc.gov/coronavirus/2019-ncov/about/steps-when-sick.html    December 28, 2020  RE:  Summer Smith                                                                                                                  [id number] [facility] [place] [address]      To whom it may concern:    I evaluated Summer Smith on December 28, 2020. Summer Smith should be excused from work/school.     They should let their workplace manager and staffing office know when their quarantine ends.    We can not give an exact date as it depends on the information below. They can calculate this on their own or work with their manager/staffing office to calculate this. (For example if they were exposed on 10/04, they would have to quarantine for 14 full days. That would be through 10/18. They could return on 10/19.)    Quarantine Guidelines:      If patient receives a positive COVID-19 test result, they should follow the guidance of those who are giving the results. Usually the return to work is 10 (or in some cases 20 days from symptom onset.) If they work at [facility], they must be cleared by Employee Occupational Health and Safety to return to work.        If patient receives a negative COVID-19 test result and did not have a high risk exposure to someone with a known positive COVID-19 test, they can return to work once they're free of fever for 24 hours without fever-reducing medication and their symptoms are improving or resolved.      If patient receives a negative COVID-19 test and if they had a  high risk exposure to someone who has tested positive for COVID-19 then they can return to work 14 days after their last contact with the positive individual    Note: If there is ongoing exposure to the covid positive person, this quarantine period may be longer than 14 days. (For example, if they are continually exposed to their child, who tested positive and cannot isolate from them, then the quarantine of 7-14 days can't start until their child is no longer contagious. This is typically 10 days from onset to the child's symptoms. So the total duration may be 17-24 days in this case.)     Sincerely,  Chris Rivera, DO

## 2020-12-29 DIAGNOSIS — Z20.822 SUSPECTED COVID-19 VIRUS INFECTION: ICD-10-CM

## 2020-12-29 LAB
DEPRECATED S PYO AG THROAT QL EIA: NEGATIVE
SPECIMEN SOURCE: NORMAL
SPECIMEN SOURCE: NORMAL
STREP GROUP A PCR: NOT DETECTED

## 2020-12-29 PROCEDURE — 87651 STREP A DNA AMP PROBE: CPT | Performed by: FAMILY MEDICINE

## 2020-12-29 PROCEDURE — U0003 INFECTIOUS AGENT DETECTION BY NUCLEIC ACID (DNA OR RNA); SEVERE ACUTE RESPIRATORY SYNDROME CORONAVIRUS 2 (SARS-COV-2) (CORONAVIRUS DISEASE [COVID-19]), AMPLIFIED PROBE TECHNIQUE, MAKING USE OF HIGH THROUGHPUT TECHNOLOGIES AS DESCRIBED BY CMS-2020-01-R: HCPCS | Performed by: FAMILY MEDICINE

## 2020-12-29 PROCEDURE — 99N1174 PR STATISTIC STREP A RAPID: Performed by: FAMILY MEDICINE

## 2020-12-30 LAB
SARS-COV-2 RNA SPEC QL NAA+PROBE: NOT DETECTED
SPECIMEN SOURCE: NORMAL

## 2021-03-02 ENCOUNTER — OFFICE VISIT (OUTPATIENT)
Dept: FAMILY MEDICINE | Facility: CLINIC | Age: 41
End: 2021-03-02
Payer: COMMERCIAL

## 2021-03-02 VITALS
WEIGHT: 284.4 LBS | BODY MASS INDEX: 47.38 KG/M2 | SYSTOLIC BLOOD PRESSURE: 150 MMHG | OXYGEN SATURATION: 98 % | HEIGHT: 65 IN | TEMPERATURE: 98.3 F | RESPIRATION RATE: 20 BRPM | DIASTOLIC BLOOD PRESSURE: 92 MMHG | HEART RATE: 62 BPM

## 2021-03-02 VITALS
TEMPERATURE: 98.3 F | RESPIRATION RATE: 20 BRPM | WEIGHT: 284.4 LBS | OXYGEN SATURATION: 98 % | BODY MASS INDEX: 47.38 KG/M2 | DIASTOLIC BLOOD PRESSURE: 92 MMHG | SYSTOLIC BLOOD PRESSURE: 150 MMHG | HEIGHT: 65 IN | HEART RATE: 62 BPM

## 2021-03-02 DIAGNOSIS — E04.1 THYROID NODULE: Primary | ICD-10-CM

## 2021-03-02 DIAGNOSIS — I26.99 RECURRENT PULMONARY EMBOLI (H): ICD-10-CM
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
## 2021-03-02 LAB
T4 FREE SERPL-MCNC: 0.99 NG/DL (ref 0.76–1.46)
TSH SERPL DL<=0.005 MIU/L-ACNC: 4.63 MU/L (ref 0.4–4)

## 2021-03-02 PROCEDURE — 84443 ASSAY THYROID STIM HORMONE: CPT | Performed by: INTERNAL MEDICINE

## 2021-03-02 PROCEDURE — 84439 ASSAY OF FREE THYROXINE: CPT | Performed by: INTERNAL MEDICINE

## 2021-03-02 PROCEDURE — 99213 OFFICE O/P EST LOW 20 MIN: CPT | Performed by: INTERNAL MEDICINE

## 2021-03-02 PROCEDURE — 36415 COLL VENOUS BLD VENIPUNCTURE: CPT | Performed by: INTERNAL MEDICINE

## 2021-03-02 RX ORDER — TRAMADOL HYDROCHLORIDE 50 MG/1
50 TABLET ORAL EVERY 6 HOURS PRN
COMMUNITY
End: 2022-10-28

## 2021-03-02 RX ORDER — CYCLOBENZAPRINE HCL 10 MG
10 TABLET ORAL 3 TIMES DAILY PRN
COMMUNITY
End: 2022-10-28

## 2021-03-02 ASSESSMENT — MIFFLIN-ST. JEOR
SCORE: 1961.94
SCORE: 1961.94

## 2021-03-02 ASSESSMENT — PAIN SCALES - GENERAL: PAINLEVEL: SEVERE PAIN (6)

## 2021-03-02 NOTE — PATIENT INSTRUCTIONS
Thyroid ultrasound was ordered.  Please call [phone] to schedule.     Let me know if the neck is not improving and you want to try the physical therapy.
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
## 2021-03-03 ENCOUNTER — APPOINTMENT (OUTPATIENT)
Dept: CT IMAGING | Facility: CLINIC | Age: 41
End: 2021-03-03
Attending: FAMILY MEDICINE
Payer: COMMERCIAL

## 2021-03-03 ENCOUNTER — HOSPITAL ENCOUNTER (EMERGENCY)
Facility: CLINIC | Age: 41
Discharge: HOME | End: 2021-03-03
Attending: FAMILY MEDICINE | Admitting: FAMILY MEDICINE
Payer: COMMERCIAL

## 2021-03-03 VITALS
HEIGHT: 66 IN | HEART RATE: 65 BPM | RESPIRATION RATE: 18 BRPM | OXYGEN SATURATION: 97 % | BODY MASS INDEX: 45 KG/M2 | WEIGHT: 280 LBS | TEMPERATURE: 98.4 F | SYSTOLIC BLOOD PRESSURE: 146 MMHG | DIASTOLIC BLOOD PRESSURE: 98 MMHG
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
## 2021-03-03 DIAGNOSIS — S39.011A STRAIN OF ABDOMINAL WALL, INITIAL ENCOUNTER: ICD-10-CM

## 2021-03-03 LAB
ALBUMIN SERPL-MCNC: 3.4 G/DL (ref 3.4–5)
ALP SERPL-CCNC: 63 U/L (ref 40–150)
ALT SERPL W P-5'-P-CCNC: 30 U/L (ref 0–50)
ANION GAP SERPL CALCULATED.3IONS-SCNC: 5 MMOL/L (ref 3–14)
AST SERPL W P-5'-P-CCNC: 14 U/L (ref 0–45)
BASOPHILS # BLD AUTO: 0.1 10E9/L (ref 0–0.2)
BASOPHILS NFR BLD AUTO: 1.6 %
BILIRUB SERPL-MCNC: 0.3 MG/DL (ref 0.2–1.3)
BUN SERPL-MCNC: 10 MG/DL (ref 7–30)
CALCIUM SERPL-MCNC: 9 MG/DL (ref 8.5–10.1)
CHLORIDE SERPL-SCNC: 107 MMOL/L (ref 94–109)
CO2 SERPL-SCNC: 28 MMOL/L (ref 20–32)
CREAT SERPL-MCNC: 0.88 MG/DL (ref 0.52–1.04)
DIFFERENTIAL METHOD BLD: NORMAL
EOSINOPHIL # BLD AUTO: 0.1 10E9/L (ref 0–0.7)
EOSINOPHIL NFR BLD AUTO: 1.8 %
ERYTHROCYTE [DISTWIDTH] IN BLOOD BY AUTOMATED COUNT: 11.8 % (ref 10–15)
GFR SERPL CREATININE-BSD FRML MDRD: 82 ML/MIN/{1.73_M2}
GLUCOSE SERPL-MCNC: 92 MG/DL (ref 70–99)
HCT VFR BLD AUTO: 45.1 % (ref 35–47)
HGB BLD-MCNC: 14.5 G/DL (ref 11.7–15.7)
IMM GRANULOCYTES # BLD: 0 10E9/L (ref 0–0.4)
IMM GRANULOCYTES NFR BLD: 0.4 %
LIPASE SERPL-CCNC: 79 U/L (ref 73–393)
LYMPHOCYTES # BLD AUTO: 1.8 10E9/L (ref 0.8–5.3)
LYMPHOCYTES NFR BLD AUTO: 35.6 %
MCH RBC QN AUTO: 28.1 PG (ref 26.5–33)
MCHC RBC AUTO-ENTMCNC: 32.2 G/DL (ref 31.5–36.5)
MCV RBC AUTO: 87 FL (ref 78–100)
MONOCYTES # BLD AUTO: 0.4 10E9/L (ref 0–1.3)
MONOCYTES NFR BLD AUTO: 8.9 %
NEUTROPHILS # BLD AUTO: 2.6 10E9/L (ref 1.6–8.3)
NEUTROPHILS NFR BLD AUTO: 51.7 %
NRBC # BLD AUTO: 0 10*3/UL
NRBC BLD AUTO-RTO: 0 /100
PLATELET # BLD AUTO: 283 10E9/L (ref 150–450)
POTASSIUM SERPL-SCNC: 4 MMOL/L (ref 3.4–5.3)
PROT SERPL-MCNC: 7.1 G/DL (ref 6.8–8.8)
RBC # BLD AUTO: 5.16 10E12/L (ref 3.8–5.2)
SODIUM SERPL-SCNC: 140 MMOL/L (ref 133–144)
WBC # BLD AUTO: 5 10E9/L (ref 4–11)

## 2021-03-03 PROCEDURE — 80053 COMPREHEN METABOLIC PANEL: CPT | Performed by: FAMILY MEDICINE

## 2021-03-03 PROCEDURE — 250N000009 HC RX 250: Performed by: FAMILY MEDICINE

## 2021-03-03 PROCEDURE — 99282 EMERGENCY DEPT VISIT SF MDM: CPT | Performed by: FAMILY MEDICINE

## 2021-03-03 PROCEDURE — 250N000011 HC RX IP 250 OP 636: Performed by: FAMILY MEDICINE

## 2021-03-03 PROCEDURE — 85025 COMPLETE CBC W/AUTO DIFF WBC: CPT | Performed by: FAMILY MEDICINE

## 2021-03-03 PROCEDURE — 83690 ASSAY OF LIPASE: CPT | Performed by: FAMILY MEDICINE

## 2021-03-03 PROCEDURE — 99285 EMERGENCY DEPT VISIT HI MDM: CPT | Mod: 25 | Performed by: FAMILY MEDICINE

## 2021-03-03 PROCEDURE — 74177 CT ABD & PELVIS W/CONTRAST: CPT

## 2021-03-03 RX ORDER — IOPAMIDOL 755 MG/ML
100 INJECTION, SOLUTION INTRAVASCULAR ONCE
Status: COMPLETED | OUTPATIENT
Start: 2021-03-03 | End: 2021-03-03

## 2021-03-03 RX ADMIN — SODIUM CHLORIDE 74 ML: 9 INJECTION, SOLUTION INTRAVENOUS at 08:51

## 2021-03-03 RX ADMIN — IOPAMIDOL 100 ML: 755 INJECTION, SOLUTION INTRAVENOUS at 08:51

## 2021-03-03 ASSESSMENT — ASTHMA QUESTIONNAIRES: ACT_TOTALSCORE: 25

## 2021-03-03 ASSESSMENT — MIFFLIN-ST. JEOR: SCORE: 1961.82

## 2021-03-03 NOTE — DISCHARGE INSTRUCTIONS
You may use acetaminophen 1000 mg 4 times per day if needed for pain.  You should avoid anti-inflammatories such as ibuprofen or naproxen while taking blood thinners.  Avoid aggravating activities such as heavy pushing, pulling, lifting.  Recheck if not gradually improving.  It may take several weeks to resolve completely.
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
## 2021-03-12 ENCOUNTER — HOSPITAL ENCOUNTER (OUTPATIENT)
Dept: ULTRASOUND IMAGING | Facility: CLINIC | Age: 41
Discharge: HOME | End: 2021-03-12
Attending: INTERNAL MEDICINE | Admitting: INTERNAL MEDICINE
Payer: COMMERCIAL

## 2021-03-12 DIAGNOSIS — E04.1 THYROID NODULE: ICD-10-CM

## 2021-03-12 PROCEDURE — 76536 US EXAM OF HEAD AND NECK: CPT

## 2021-04-02 ENCOUNTER — OFFICE VISIT (OUTPATIENT)
Dept: FAMILY MEDICINE | Facility: CLINIC | Age: 41
End: 2021-04-02
Payer: COMMERCIAL

## 2021-04-02 VITALS
OXYGEN SATURATION: 98 % | BODY MASS INDEX: 45.81 KG/M2 | RESPIRATION RATE: 20 BRPM | HEART RATE: 72 BPM | DIASTOLIC BLOOD PRESSURE: 72 MMHG | TEMPERATURE: 98.2 F | WEIGHT: 283.8 LBS | SYSTOLIC BLOOD PRESSURE: 128 MMHG

## 2021-04-02 DIAGNOSIS — M54.50 RIGHT-SIDED LOW BACK PAIN WITHOUT SCIATICA, UNSPECIFIED CHRONICITY: Primary | ICD-10-CM

## 2021-04-02 PROCEDURE — 99214 OFFICE O/P EST MOD 30 MIN: CPT | Performed by: NURSE PRACTITIONER

## 2021-04-02 RX ORDER — TIZANIDINE HYDROCHLORIDE 4 MG/1
4 CAPSULE, GELATIN COATED ORAL 3 TIMES DAILY
Qty: 30 CAPSULE | Refills: 1 | Status: SHIPPED | OUTPATIENT
Start: 2021-04-02 | End: 2022-10-28

## 2021-04-02 RX ORDER — LORAZEPAM 1 MG/1
1 TABLET ORAL EVERY 6 HOURS PRN
Qty: 2 TABLET | Refills: 0 | Status: SHIPPED | OUTPATIENT
Start: 2021-04-02 | End: 2022-10-28
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
## 2021-04-02 NOTE — PATIENT INSTRUCTIONS
Schedule MRI. Can take Ativan 30 minutes prior, repeat in 30 minutes if needed.  Try Zanaflex instead of flexeril.

## 2021-04-10 ENCOUNTER — HOSPITAL ENCOUNTER (OUTPATIENT)
Dept: MRI IMAGING | Facility: CLINIC | Age: 41
Discharge: HOME | End: 2021-04-10
Attending: NURSE PRACTITIONER | Admitting: NURSE PRACTITIONER
Payer: COMMERCIAL

## 2021-04-10 DIAGNOSIS — M54.50 RIGHT-SIDED LOW BACK PAIN WITHOUT SCIATICA, UNSPECIFIED CHRONICITY: ICD-10-CM

## 2021-04-10 PROCEDURE — 72148 MRI LUMBAR SPINE W/O DYE: CPT

## 2021-04-14 DIAGNOSIS — M54.50 ACUTE MIDLINE LOW BACK PAIN WITHOUT SCIATICA: Primary | ICD-10-CM

## 2021-04-24 ENCOUNTER — HEALTH MAINTENANCE LETTER (OUTPATIENT)
Age: 41
End: 2021-04-24

## 2021-04-27 ENCOUNTER — HOSPITAL ENCOUNTER (OUTPATIENT)
Dept: PHYSICAL THERAPY | Facility: CLINIC | Age: 41
Setting detail: THERAPIES SERIES
End: 2021-04-27
Attending: NURSE PRACTITIONER
Payer: COMMERCIAL

## 2021-04-27 DIAGNOSIS — M54.50 ACUTE MIDLINE LOW BACK PAIN WITHOUT SCIATICA: ICD-10-CM

## 2021-04-27 PROCEDURE — 97161 PT EVAL LOW COMPLEX 20 MIN: CPT | Mod: GP | Performed by: PHYSICAL THERAPIST

## 2021-04-27 PROCEDURE — 97140 MANUAL THERAPY 1/> REGIONS: CPT | Mod: GP | Performed by: PHYSICAL THERAPIST
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
## 2021-04-29 ENCOUNTER — HOSPITAL ENCOUNTER (OUTPATIENT)
Dept: PHYSICAL THERAPY | Facility: CLINIC | Age: 41
Setting detail: THERAPIES SERIES
End: 2021-04-29
Attending: NURSE PRACTITIONER
Payer: COMMERCIAL

## 2021-04-29 PROCEDURE — 97140 MANUAL THERAPY 1/> REGIONS: CPT | Mod: GP | Performed by: PHYSICAL THERAPIST

## 2021-05-13 ENCOUNTER — HOSPITAL ENCOUNTER (OUTPATIENT)
Dept: PHYSICAL THERAPY | Facility: CLINIC | Age: 41
Setting detail: THERAPIES SERIES
End: 2021-05-13
Attending: NURSE PRACTITIONER
Payer: COMMERCIAL

## 2021-05-13 PROCEDURE — 97140 MANUAL THERAPY 1/> REGIONS: CPT | Mod: GP | Performed by: PHYSICAL THERAPIST

## 2021-05-13 PROCEDURE — 97110 THERAPEUTIC EXERCISES: CPT | Mod: GP | Performed by: PHYSICAL THERAPIST

## 2021-05-19 ENCOUNTER — HOSPITAL ENCOUNTER (OUTPATIENT)
Dept: PHYSICAL THERAPY | Facility: CLINIC | Age: 41
Setting detail: THERAPIES SERIES
End: 2021-05-19
Attending: NURSE PRACTITIONER
Payer: COMMERCIAL

## 2021-05-19 PROCEDURE — 97140 MANUAL THERAPY 1/> REGIONS: CPT | Mod: GP | Performed by: PHYSICAL THERAPIST
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
## 2021-06-04 VITALS — WEIGHT: 287.5 LBS | BODY MASS INDEX: 46.21 KG/M2 | HEIGHT: 66 IN

## 2021-06-05 NOTE — PLAN OF CARE
Patients states pain adequately controlled with current pain medications;  essential oils offered, will continue to monitor.

## 2021-06-05 NOTE — PLAN OF CARE
Problem: Discharge Barriers  Goal: Patient s discharge needs will be met  Outcome: Progressing  Care Plan-Care Management    Care Management Goals of the Day:   Follow progression of care.     Care Progression to be reviewed with MD and RN CM: Updates to be provided to Charge RN, CMSW, HUC,therapy and other disciplines as indicated.    Barriers to Discharge and plan:  Pulmonary embolism on Heparin drip. (Per MD, possible discharge after US today)    Discharge Disposition:   Home.     Expected Discharge Date:  1/18/2020 or 1/20/2020 (Addendum: possible discharge 1/17/2020)    Transportation:  Family or friends.     Care Management/Coordination Narrative:   Patient is independent with activities of daily living at baseline.  No care management needs identified at this time but CM will continue to monitor progression of care, review team recommendations and provide discharge planning assist as needed.      Abbreviation  Code:  [facility] ([facility]), [facility]cher ([facility]), Patient (pt), Transitional Care Unit (TCU), Skilled Nursing Facility (SNF), Long Term Care (LTC), Assisted Living (LISA or AL), Care Management (CM), Physical Therapy (PT), Occupational Therapy (OT), Speech Therapy (ST), Respiratory Therapy (RT).

## 2021-06-05 NOTE — PROGRESS NOTES
Spiritual Care Note    Spiritual Assessment:  Patient seen by Deacon Palma for spiritual support prior to discharge.     Care Provided: Listening, support, and prayer provided.     Plan of Care: No follow up required; patient has discharged.     ELISA Simon, BCC

## 2021-06-05 NOTE — H&P
Hospital History and Physical   Summer Smith   1980  MRN [id number]  Date of Admission: 2020  Date of Service: 2020  PCP: Lucille Guan DO    Code status: Full Code     Chief Complaint   Difficulty breathing     Patient Profile   Social History     Social History Narrative    , Fazal.  Two daughters, Lucille and Haydee.  RN now NP.          Past Medical History   Patient Active Problem List   Diagnosis     Atrial fibrillation with rapid ventricular response (H)     S/P hysterectomy     Pulmonary embolism and infarction (H)     Post-thrombotic syndrome     May-Thurner syndrome     Mild intermittent asthma without complication     Thyroid nodule     Hepatic steatosis       Past Surgical History  She has a past surgical history that includes stenting into pelvis (); saphenous vein removed (Left); wisdom teeth extractions; Hysterectomy (2016); and Ablation saphenous vein w/ RFA.     History of Present Illness   This 39 y.o. 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  She notices both at rest and with exertion.  She then started to develop pleuritic chest pain today.  She has a history of pulmonary embolus in .  This was unprovoked with the exception of the finding of may Thurner syndrome for which she received an iliac vein stent.  He was on short-term anticoagulation.  She did well until  when she had a hysterectomy.  She went on warfarin prophylactically and developed bleeding after hysterectomy.  She held warfarin for 7 days and then developed acute pulmonary embolus.  At this time she was on Pradaxa and seen by hematology.  It was felt that her PE was provoked and therefore anticoagulation was stopped.  Currently she has been studying for [employer] and so has been sitting for more prolonged period of time but  otherwise has had no recent trauma or surgery.  She has no known thrombophilia and reports that she was evaluated for this by [facility] around 2007.  No significant family history of venous thrombosis.  She did develop post thrombotic syndrome of her left leg.  She has had procedures for this and has ongoing mild swelling.  She otherwise is fairly healthy.  Has asthma which is mild intermittent and controlled with albuterol.    Review of Systems: A comprehensive review of systems was negative except as noted.     Medications and Allergies   Current Facility-Administered Medications   Medication Dose Route Frequency Provider Last Rate Last Dose     albuterol inhaler 2 puff (PROAIR HFA;PROVENTIL HFA;VENTOLIN HFA)  2 puff Inhalation Q6H PRN Vaibhav Ott MD         bisacodyL suppository 10 mg (DULCOLAX)  10 mg Rectal Daily PRN Vaibhav Ott MD         cholecalciferol (vitamin D3) 1,000 unit (25 mcg) tablet 4,000 Units  4,000 Units Oral DAILY Vaibhav Ott MD         fluticasone propionate 50 mcg/actuation nasal spray 1 spray (FLONASE)  1 spray Nasal Bedtime PRN Vaibhav Ott MD         heparin 25,000 units in 0.45% sodium chloride (100 units/ml) 250 mL ANTICOAGULANT infusion  1-50 Units/kg/hr (Order-Specific) Intravenous Continuous Vaibhav Ott MD         heparin ANTICOAGULANT - LOADING DOSE from infusion 8,000 Units  61.1 Units/kg Intravenous Once Vaibhav Ott MD         levOCARNitine tablet 330 mg (CARNITOR)  330 mg Oral BID Vaibhav Ott MD         magnesium hydroxide suspension 30 mL (MILK OF MAG)  30 mL Oral Daily PRN Vaibhav Ott MD         ondansetron injection 4 mg (ZOFRAN)  4 mg Intravenous Q4H PRN Vaibhav Ott MD        Or     ondansetron tablet 8 mg (ZOFRAN)  8 mg Oral Q8H PRN Vaibhav Ott MD         [START ON 1/17/2020] polyethylene glycol packet 17 g (MIRALAX)  17 g Oral DAILY Vaibhav Ott MD         Allergies    Allergen Reactions     Penicillins Other (See Comments)     Added by pt due to Augmentin allergy     Sulfa (Sulfonamide Antibiotics) Hives     Vitamin K [Phytonadione (Vitamin K1)] Itching     Anaphylactoid reaction to IV infusion in ED 11/16/16.  Use PO or give slower if needed in the future.     Augmentin [Amoxicillin-Pot Clavulanate] Rash     Raised rash        Family and Social History   Family History   Problem Relation Age of Onset     No Medical Problems Mother      Diabetes Father      Hypertension Father      Hyperlipidemia Father      No Medical Problems Sister      No Medical Problems Sister      Autism Daughter      Spina bifida Daughter         Social History     Tobacco Use     Smoking status: Never Smoker     Smokeless tobacco: Never Used   Substance Use Topics     Alcohol use: No     Drug use: No        Physical Exam   General Appearance:   No acute distress    /86 (Patient Position: Sitting)   Pulse 78   Temp 97.8  F (36.6  C) (Oral)   Resp 19   Wt (!) 288 lb 12.8 oz (131 kg)   SpO2 100%   BMI 46.61 kg/m      EYES: Eyelids, conjunctiva, and sclera were normal. Pupils were normal. Cornea, iris, and lens were normal bilaterally.  HEAD, EARS, NOSE, MOUTH, AND THROAT: Head and face were normal. Hearing was normal to voice and the ears were normal to external exam. Nose appearance was normal and there was no discharge. Oropharynx was normal.  NECK: Neck appearance was normal. There were no neck masses and the thyroid was not enlarged.  RESPIRATORY: Breathing pattern was normal and the chest moved symmetrically.  Percussion/auscultatory percussion was normal.  Lung sounds were normal and there were no abnormal sounds.  CARDIOVASCULAR: Heart rate and rhythm were normal.  S1 and S2 were normal and there were no extra sounds or murmurs. Peripheral pulses in arms and legs were normal.  Jugular venous pressure was normal.  There was no peripheral edema.  GASTROINTESTINAL: The abdomen was normal  in contour.  Bowel sounds were present.  Percussion detected no organ enlargement or tenderness.  Palpation detected no tenderness, mass, or enlarged organs.   MUSCULOSKELETAL: Skeletal configuration was normal and muscle mass was normal for age. Joint appearance was overall normal.  LYMPHATIC: There were no enlarged nodes.  SKIN/HAIR/NAILS: Skin color was normal.  There were no skin lesions.  Hair and nails were normal.  NEUROLOGIC: The patient was alert and oriented to person, place, time, and circumstance. Speech was normal. Cranial nerves were normal. Motor strength was normal for age. The patient was normally coordinated.  PSYCHIATRIC:  Mood and affect were normal and the patient had normal recent and remote memory. The patient's judgment and insight were normal.       Additional Information   Laboratory: Basic metabolic panel looked okay, negative troponin, normal CBC    Radiology:     IMPRESSION:  1.  Acute pulmonary emboli in the lobar and segmental pulmonary arteries of both  lower lobes. No central emboli.  2.  Pulmonary arteries are normal caliber. Straightening of the intraventricular  septum and slight increase of the RV/LV ratio at 1.0 could indicate some degree  of right heart strain.  3.  Lungs are clear.  4.  Stable 2 cm thyroid nodule. Please refer to management guidelines below.  5.  Borderline hepatomegaly and moderate to severe diffuse hepatic steatosis.    I discussed with the emergency room physician     Assessment and Plan   Active Hospital Problems    *Pulmonary embolism and infarction (H)      Post-thrombotic syndrome      May-Thurner syndrome      Mild intermittent asthma without complication      Thyroid nodule      Hepatic steatosis      Atrial fibrillation with rapid ventricular response (H)      S/P hysterectomy      This is a 39-year-old woman with acute pulmonary embolus, recurrent, unprovoked.  CT evidence of some flattening of the intraventricular septum.  Negative troponin and she  is hemodynamically stable.  She is not a candidate for TPA.  Given the intraventricular septum flattening I would like to use heparin in the immediate timeframe.  Will obtain an echocardiogram.  I discussed transition factor Xa inhibitor, oral, such as apixaban.  I would recommend indefinite anticoagulation.    Thyroid nodule, can be evaluated and followed outpatient    Hepatic steatosis, outpatient management    Atrial fibrillation is in the context of an attempted placement of an IVC filter.  No other known atrial arrhythmias.    Thrombotic syndrome, stable    Barriers to discharge/Disposition: Inpatient care     Vaibhav Ott MD  Internal Medicine  Contact me at [phone] or at  [facility]

## 2021-06-05 NOTE — PLAN OF CARE
Problem: Pain  Goal: Patient's pain/discomfort is manageable  Outcome: Progressing   Denies any lung pain or chest pain.     Problem: Discharge Barriers  Goal: Patient's discharge needs are met  Outcome: Progressing   Possible discharge today after LE US. Pt started on Elliquis and should discharge later today.

## 2021-06-05 NOTE — PLAN OF CARE
Problem: Pain  Goal: Patient's pain/discomfort is manageable  Outcome: Progressing     Problem: Safety  Goal: Patient will be injury free during hospitalization  Outcome: Progressing     Problem: Daily Care  Goal: Daily care needs are met  Outcome: Progressing     Problem: Psychosocial Needs  Goal: Collaborate with patient/family/caregiver to identify patient specific goals for this hospitalization  Outcome: Progressing

## 2021-06-28 NOTE — PROGRESS NOTES
Progress Notes by Dima Davis, Pharmacy Student at 1/16/2020  3:18 PM     Author: Dima Davis, Pharmacy Student Service: Pharmacy Author Type: Pharmacy Student    Filed: 1/16/2020  3:23 PM Date of Service: 1/16/2020  3:18 PM Status: Attested    : Dima Davis, Pharmacy Student (Pharmacy Student)    Related Notes: Original Note by Dima Davis, Pharmacy Student (Pharmacy Student) filed at 1/16/2020  3:21 PM    Cosigner: Carmencita Grijalva, PharmAD at 1/16/2020  3:23 PM    Attestation signed by Carmencita Grijalva PharmD at 1/16/2020  3:23 PM    Although I was not present for the medication history interview, to my knowledge, the intern has compiled the PTA medication list to the best of his ability given the information available at the present time.    Carmencita Grijalva PharmD     1/16/2020     3:23 PM                Pharmacy Note - Admission Medication History     ______________________________________________________________________    Prior To Admission (PTA) med list completed and updated in EMR.       PTA Med List   Medication Sig Last Dose   ? albuterol (PROVENTIL HFA;VENTOLIN HFA) 90 mcg/actuation inhaler Inhale 2 puffs every 6 (six) hours as needed for wheezing. 1/15/2020 at Unknown time   ? cholecalciferol, vitamin D3, 1,000 unit (25 mcg) tablet Take 4,000 Units by mouth daily. 1/15/2020 at Unknown time   ? fluticasone (FLONASE) 50 mcg/actuation nasal spray 1 spray into each nostril bedtime as needed for rhinitis.  Past Month at Unknown time   ? levOCARNitine (CARNITOR) 330 mg tablet Take 500 mg by mouth 2 (two) times a day. 1/15/2020 at Unknown time   ? multivitamin (MULTIPLE VITAMIN ESSENTIAL) per tablet Take 1 tablet by mouth daily. 1/15/2020 at Unknown time       Information source(s): Patient and Hospital records    Summary of Changes to PTA Med List  New: Levocarnitine/Vitamin D3  Discontinued: Tylenol/Norco 5-325/Naproxen/Omeprazole/Senna  Changed: -    Patient was  asked about OTC/herbal products specifically.  PTA med list reflects this.    Based on the pharmacists assessment, the PTA med list information appears reliable    Patient appears adherent: Partially: reason  Patient decision    Allergies were reviewed, assessed, and updated with the patient.      Patient does not anticipate needing any multi-use medications during admission.    Thank you for the opportunity to participate in the care of this patient.    Dima Davis, Pharmacy Student  1/16/2020 3:18 PM

## 2021-06-30 ENCOUNTER — HOSPITAL ENCOUNTER (EMERGENCY)
Facility: CLINIC | Age: 41
Discharge: HOME | End: 2021-06-30
Attending: EMERGENCY MEDICINE | Admitting: EMERGENCY MEDICINE
Payer: COMMERCIAL

## 2021-06-30 ENCOUNTER — APPOINTMENT (OUTPATIENT)
Dept: GENERAL RADIOLOGY | Facility: CLINIC | Age: 41
End: 2021-06-30
Attending: EMERGENCY MEDICINE
Payer: COMMERCIAL

## 2021-06-30 VITALS
WEIGHT: 275 LBS | TEMPERATURE: 99.2 F | HEART RATE: 107 BPM | OXYGEN SATURATION: 96 % | DIASTOLIC BLOOD PRESSURE: 99 MMHG | HEIGHT: 66 IN | RESPIRATION RATE: 18 BRPM | SYSTOLIC BLOOD PRESSURE: 178 MMHG | BODY MASS INDEX: 44.2 KG/M2

## 2021-06-30 DIAGNOSIS — J45.31 MILD PERSISTENT ASTHMA WITH EXACERBATION: ICD-10-CM

## 2021-06-30 PROCEDURE — 250N000009 HC RX 250: Performed by: EMERGENCY MEDICINE

## 2021-06-30 PROCEDURE — 999N000157 HC STATISTIC RCP TIME EA 10 MIN

## 2021-06-30 PROCEDURE — 999N000105 HC STATISTIC NO DOCUMENTATION TO SUPPORT CHARGE

## 2021-06-30 PROCEDURE — 99284 EMERGENCY DEPT VISIT MOD MDM: CPT | Performed by: EMERGENCY MEDICINE

## 2021-06-30 PROCEDURE — 71046 X-RAY EXAM CHEST 2 VIEWS: CPT

## 2021-06-30 PROCEDURE — 250N000012 HC RX MED GY IP 250 OP 636 PS 637: Performed by: EMERGENCY MEDICINE

## 2021-06-30 PROCEDURE — 94640 AIRWAY INHALATION TREATMENT: CPT | Mod: 76

## 2021-06-30 PROCEDURE — 99283 EMERGENCY DEPT VISIT LOW MDM: CPT | Performed by: EMERGENCY MEDICINE

## 2021-06-30 RX ORDER — PREDNISONE 20 MG/1
TABLET ORAL
Qty: 10 TABLET | Refills: 0 | Status: SHIPPED | OUTPATIENT
Start: 2021-06-30 | End: 2022-10-28

## 2021-06-30 RX ORDER — IPRATROPIUM BROMIDE AND ALBUTEROL SULFATE 2.5; .5 MG/3ML; MG/3ML
3 SOLUTION RESPIRATORY (INHALATION) ONCE
Status: COMPLETED | OUTPATIENT
Start: 2021-06-30 | End: 2021-06-30

## 2021-06-30 RX ORDER — PREDNISONE 20 MG/1
TABLET ORAL
Qty: 10 TABLET | Refills: 0 | Status: SHIPPED | OUTPATIENT
Start: 2021-06-30 | End: 2021-06-30

## 2021-06-30 RX ORDER — PREDNISONE 20 MG/1
60 TABLET ORAL ONCE
Status: COMPLETED | OUTPATIENT
Start: 2021-06-30 | End: 2021-06-30

## 2021-06-30 RX ADMIN — PREDNISONE 60 MG: 20 TABLET ORAL at 21:00

## 2021-06-30 RX ADMIN — IPRATROPIUM BROMIDE AND ALBUTEROL SULFATE 3 ML: .5; 3 SOLUTION RESPIRATORY (INHALATION) at 20:17

## 2021-06-30 RX ADMIN — IPRATROPIUM BROMIDE AND ALBUTEROL SULFATE 3 ML: .5; 3 SOLUTION RESPIRATORY (INHALATION) at 22:41

## 2021-06-30 ASSESSMENT — MIFFLIN-ST. JEOR: SCORE: 1939.14
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
## 2021-07-01 NOTE — DISCHARGE INSTRUCTIONS
return if symptoms worsen or new symptoms develop.  Follow-up with primary care physician next available.  Drink plenty of fluids.  Take prednisone as directed.  Use your inhaler as needed.  If increased shortness of breath chest pain or other symptoms present please return for recheck.

## 2021-07-02 NOTE — DISCHARGE SUMMARY
Discharge Summary by Alicia Rosario MD at 1/17/2020  2:44 PM     Author: Alicia Rosario MD Service: Hospitalist Author Type: Physician    Filed: 1/17/2020  2:57 PM Date of Service: 1/17/2020  2:44 PM Status: Signed    : Alicia Rosario MD (Physician)         [facility] MEDICINE  DISCHARGE SUMMARY     Primary Care Physician: Kalyan Hogan MD  Admission Date: 1/16/2020   Discharge Provider: Alicia Rosario Discharge Date: 1/17/2020   Diet: regular diet   Code Status: Full Code   Activity: activity as tolerated        Condition at Discharge: Stable      REASON FOR PRESENTATION(See Admission Note for Details)   Shortness of breath    PRINCIPAL & ACTIVE DISCHARGE DIAGNOSES     Principal Problem:    Pulmonary embolism and infarction (H)  Active Problems:    Atrial fibrillation with rapid ventricular response (H)    S/P hysterectomy    Post-thrombotic syndrome    May-Thurner syndrome    Mild intermittent asthma without complication    Thyroid nodule    Hepatic steatosis      SIGNIFICANT FINDINGS (Imaging, labs):   Us Venous Leg Left  Result Date: 1/17/2020  No deep venous thrombosis in the left lower extremity.    CTA chest 1/16/2020  IMPRESSION:  1.  Acute pulmonary emboli in the lobar and segmental pulmonary arteries of both  lower lobes. No central emboli.  2.  Pulmonary arteries are normal caliber. Straightening of the intraventricular  septum and slight increase of the RV/LV ratio at 1.0 could indicate some degree  of right heart strain.  3.  Lungs are clear.  4.  Stable 2 cm thyroid nodule. Please refer to management guidelines below.  5.  Borderline hepatomegaly and moderate to severe diffuse hepatic steatosis.    Echo 1/17/2020    Left ventricle ejection fraction is normal. The calculated left ventricular ejection fraction is 66%.    Normal left ventricular size.    Normal right ventricular size and systolic function.    Trace to mild tricuspid valve regurgitation    Trace to  mild pulmonic regurgitation    No previous study for comparison.    PENDING LABS         PROCEDURES ( this hospitalization only)      * No surgery found *    RECOMMENDATIONS TO OUTPATIENT PROVIDER FOR F/U VISIT          DISPOSITION     Home    SUMMARY OF HOSPITAL COURSE:      39-year-old female with past medical history of May Thurner syndrome, pulmonary embolus, presented with complaints of 2 days of shortness of breath and pleuritic chest pain.  She was found to have bilateral pulmonary emboli.  Echocardiogram was done which did not show any right heart strain.  Patient complained of some mild left calf pain this morning for which ultrasound was done which did not show any DVT.  Patient was initially started on IV heparin which was later switched to Eliquis.  She is being discharged home in stable condition    Discharge Medications with Med changes:        Medication List      START taking these medications    * apixaban ANTICOAGULANT 5 mg Tab tablet  Quantity:  28 tablet  Dose:  10 mg  Commonly known as:  ELIQUIS  10 mg, Oral, 2 times daily     * apixaban ANTICOAGULANT 5 mg Tab tablet  Quantity:  60 tablet  Dose:  5 mg  Start taking on:  January 24, 2020  Commonly known as:  ELIQUIS  5 mg, Oral, 2 times daily         * This list has 2 medication(s) that are the same as other medications prescribed for you. Read the directions carefully, and ask your doctor or other care provider to review them with you.            CONTINUE taking these medications    albuterol 90 mcg/actuation inhaler  Dose:  2 puff  Commonly known as:  PROAIR HFA;PROVENTIL HFA;VENTOLIN HFA  2 puffs, Inhalation, Every 6 hours PRN     cholecalciferol (vitamin D3) 1,000 unit (25 mcg) tablet  Dose:  4,000 Units  4,000 Units, Oral, DAILY     fluticasone propionate 50 mcg/actuation nasal spray  Dose:  1 spray  Commonly known as:  FLONASE  1 spray, Nasal, Bedtime PRN     levOCARNitine 330 mg tablet  Dose:  500 mg  Commonly known as:  CARNITOR  500 mg,  Oral, 2 times daily     Multiple Vitamin Essential per tablet  Dose:  1 tablet  Generic drug:  multivitamin  1 tablet, Oral, DAILY                Rationale for medication changes:      Apixaban for pulmonary embolism        Consults   none      Immunizations given this encounter            Anticoagulation Information      Recent INR results:   Results from last 7 days   Lab Units 01/16/20  1812   LN-INR  1.03     Warfarin doses (if applicable) or name of other anticoagulant: Eliquis      Discharge Orders     Discharge Orders   Activity as tolerated   Order Comments: Rest when tired     Discharge diet - Regular     Call MD:  if symptoms get worse     Call MD for:  redness or swelling     Call MD:  if pain or burning when you urinate     Call MD for:  difficulty breathing, headache or visual disturbances     Call MD for:  temperature greater than 101     Call MD for:  severe uncontrolled pain     Call MD for:  constipation (difficulty having a bowel movement)     Call MD for:  dizziness, persistent nausea or vomiting     Call MD for:  weight gain - more than two pounds in a day or five pounds in a week     Discharge Follow Up - Primary Care Clinic     Order Specific Question Answer Comments   Follow-up in: 3-5 days    Schedule [facility] or [facility] follow-up appointment Follow-up appointment with Primary Care Clinic to be scheduled before leaving the hospital      Examination     Vital Signs in last 24 hours:   Temp:  [97.4  F (36.3  C)-98.4  F (36.9  C)] 98.2  F (36.8  C)  Heart Rate:  [58-78] 67  Resp:  [17-19] 17  BP: (112-148)/(53-86) 134/78  SpO2:  [94 %-100 %] 94 %  General appearance: alert, appears stated age and cooperative  Eyes: negative  Lungs: clear to auscultation bilaterally  Heart: regular rate and rhythm and S1, S2 normal  Abdomen: soft, non-tender; bowel sounds normal; no masses,  no organomegaly  Extremities: extremities normal, atraumatic, no cyanosis or edema  Neurologic: Grossly normal        Please see EMR for more detailed significant labs, imaging, consultant notes etc.  Total time spent on discharge: 40 minutes    Alicia Rosario MD   [facility]ist Service: Ph:[phone]    CC:Kalyan Hogan MD

## 2021-10-04 ENCOUNTER — HEALTH MAINTENANCE LETTER (OUTPATIENT)
Age: 41
End: 2021-10-04

## 2022-01-22 ENCOUNTER — TELEPHONE (OUTPATIENT)
Dept: FAMILY MEDICINE | Facility: CLINIC | Age: 42
End: 2022-01-22
Payer: COMMERCIAL

## 2022-01-22 DIAGNOSIS — I26.99 RECURRENT PULMONARY EMBOLI (H): ICD-10-CM

## 2022-01-22 NOTE — TELEPHONE ENCOUNTER
Eliquis is non formulary this year. Insurance prefers Warfarin or Xarelto.  Eith erchange medication or start PA process for Eliquis.    Prior Authorization Retail Medication Request    Medication/Dose: Eliquis 5mg tabs  ICD code (if different than what is on RX):    Previously Tried and Failed:    Rationale:      Insurance Name:  [facility]   Insurance ID:  [id number]      Pharmacy Information (if different than what is on RX)  Name:    Phone:      Thanks,   Philly Bautista  Certified Pharmacy Technician  [facility]  [phone]

## 2022-01-24 NOTE — TELEPHONE ENCOUNTER
Changed to Xarelto, please inform patient    20 mg daily evening, follow up with PCP for future refills     KINGA Celis CNP, covering for Dr. Hogan

## 2022-01-24 NOTE — TELEPHONE ENCOUNTER
Call placed to patient. Medication change discussed. She is aware was sent to her pharmacy and she will check there. She has no questions or concerns at this time

## 2022-05-15 ENCOUNTER — HEALTH MAINTENANCE LETTER (OUTPATIENT)
Age: 42
End: 2022-05-15

## 2022-05-24 ENCOUNTER — MYC REFILL (OUTPATIENT)
Dept: FAMILY MEDICINE | Facility: CLINIC | Age: 42
End: 2022-05-24
Payer: COMMERCIAL

## 2022-05-24 DIAGNOSIS — I26.99 RECURRENT PULMONARY EMBOLI (H): ICD-10-CM

## 2022-05-25 NOTE — TELEPHONE ENCOUNTER
Has appointment scheduled for 8/10/22 with Dr Brooks      Requested Prescriptions   Pending Prescriptions Disp Refills     rivaroxaban ANTICOAGULANT (XARELTO ANTICOAGULANT) 20 MG TABS tablet 90 tablet 0     Sig: Take 1 tablet (20 mg) by mouth daily (with dinner)       Direct Oral Anticoagulant Agents Failed - 5/24/2022  9:01 AM        Failed - Normal Platelets on file in past 12 months     Recent Labs   Lab Test 03/03/21  0812                  Failed - Creatinine Clearance greater than 50 ml/min on file in past 3 mos     No lab results found.          Failed - Serum creatinine less than or equal to 1.4 on file in past 3 mos     Recent Labs   Lab Test 03/03/21  0812   CR 0.88       Ok to refill medication if creatinine is low          Failed - Recent (6 mo) or future (30 days) visit within the authorizing provider's specialty        Passed - Medication is active on med list        Passed - Patient is 18 years of age or older        Passed - No active pregnancy on record        Passed - No positive pregnancy test within past 12 months

## 2022-09-11 ENCOUNTER — HEALTH MAINTENANCE LETTER (OUTPATIENT)
Age: 42
End: 2022-09-11

## 2022-10-28 ENCOUNTER — OFFICE VISIT (OUTPATIENT)
Dept: FAMILY MEDICINE | Facility: CLINIC | Age: 42
End: 2022-10-28
Payer: COMMERCIAL

## 2022-10-28 VITALS
SYSTOLIC BLOOD PRESSURE: 142 MMHG | DIASTOLIC BLOOD PRESSURE: 80 MMHG | OXYGEN SATURATION: 98 % | WEIGHT: 281.6 LBS | TEMPERATURE: 98.1 F | HEIGHT: 65 IN | HEART RATE: 70 BPM | BODY MASS INDEX: 46.92 KG/M2

## 2022-10-28 DIAGNOSIS — M79.604 BILATERAL LEG PAIN: ICD-10-CM

## 2022-10-28 DIAGNOSIS — M79.605 BILATERAL LEG PAIN: ICD-10-CM

## 2022-10-28 DIAGNOSIS — Z00.00 ROUTINE GENERAL MEDICAL EXAMINATION AT A HEALTH CARE FACILITY: Primary | ICD-10-CM

## 2022-10-28 DIAGNOSIS — E04.1 THYROID NODULE: ICD-10-CM

## 2022-10-28 DIAGNOSIS — M25.561 CHRONIC PAIN OF RIGHT KNEE: ICD-10-CM

## 2022-10-28 DIAGNOSIS — I26.99 RECURRENT PULMONARY EMBOLI (H): ICD-10-CM

## 2022-10-28 DIAGNOSIS — Z13.220 SCREENING CHOLESTEROL LEVEL: ICD-10-CM

## 2022-10-28 DIAGNOSIS — G89.29 CHRONIC PAIN OF RIGHT KNEE: ICD-10-CM

## 2022-10-28 DIAGNOSIS — J45.20 MILD INTERMITTENT REACTIVE AIRWAY DISEASE WITHOUT COMPLICATION: ICD-10-CM

## 2022-10-28 DIAGNOSIS — I87.1 MAY-THURNER SYNDROME: ICD-10-CM

## 2022-10-28 LAB
CHOLEST SERPL-MCNC: 249 MG/DL
HDLC SERPL-MCNC: 47 MG/DL
HGB BLD-MCNC: 15.1 G/DL (ref 11.7–15.7)
LDLC SERPL CALC-MCNC: 183 MG/DL
NONHDLC SERPL-MCNC: 202 MG/DL
TRIGL SERPL-MCNC: 93 MG/DL
TSH SERPL DL<=0.005 MIU/L-ACNC: 2.07 UIU/ML (ref 0.3–4.2)

## 2022-10-28 PROCEDURE — 80061 LIPID PANEL: CPT | Performed by: FAMILY MEDICINE

## 2022-10-28 PROCEDURE — 99214 OFFICE O/P EST MOD 30 MIN: CPT | Mod: 25 | Performed by: FAMILY MEDICINE

## 2022-10-28 PROCEDURE — 99396 PREV VISIT EST AGE 40-64: CPT | Performed by: FAMILY MEDICINE

## 2022-10-28 PROCEDURE — 36415 COLL VENOUS BLD VENIPUNCTURE: CPT | Performed by: FAMILY MEDICINE

## 2022-10-28 PROCEDURE — 85018 HEMOGLOBIN: CPT | Performed by: FAMILY MEDICINE

## 2022-10-28 PROCEDURE — 84443 ASSAY THYROID STIM HORMONE: CPT | Performed by: FAMILY MEDICINE

## 2022-10-28 RX ORDER — CELECOXIB 100 MG/1
100 CAPSULE ORAL 2 TIMES DAILY PRN
Qty: 60 CAPSULE | Refills: 3 | Status: SHIPPED | OUTPATIENT
Start: 2022-10-28 | End: 2023-07-10

## 2022-10-28 RX ORDER — ALBUTEROL SULFATE 90 UG/1
2 AEROSOL, METERED RESPIRATORY (INHALATION) EVERY 4 HOURS PRN
Qty: 18 G | Refills: 1 | Status: SHIPPED | OUTPATIENT
Start: 2022-10-28 | End: 2023-08-01

## 2022-10-28 ASSESSMENT — ENCOUNTER SYMPTOMS
ARTHRALGIAS: 0
DYSURIA: 0
WEAKNESS: 0
PALPITATIONS: 0
SORE THROAT: 0
DIARRHEA: 0
FEVER: 0
BREAST MASS: 0
JOINT SWELLING: 0
DIZZINESS: 0
HEARTBURN: 0
NERVOUS/ANXIOUS: 0
MYALGIAS: 1
EYE PAIN: 0
SHORTNESS OF BREATH: 0
HEMATURIA: 0
HEADACHES: 0
ABDOMINAL PAIN: 0
COUGH: 0
PARESTHESIAS: 0
HEMATOCHEZIA: 0
NAUSEA: 0
CHILLS: 0
CONSTIPATION: 0
FREQUENCY: 0

## 2022-10-28 ASSESSMENT — ASTHMA QUESTIONNAIRES
QUESTION_5 LAST FOUR WEEKS HOW WOULD YOU RATE YOUR ASTHMA CONTROL: COMPLETELY CONTROLLED
QUESTION_4 LAST FOUR WEEKS HOW OFTEN HAVE YOU USED YOUR RESCUE INHALER OR NEBULIZER MEDICATION (SUCH AS ALBUTEROL): NOT AT ALL
QUESTION_3 LAST FOUR WEEKS HOW OFTEN DID YOUR ASTHMA SYMPTOMS (WHEEZING, COUGHING, SHORTNESS OF BREATH, CHEST TIGHTNESS OR PAIN) WAKE YOU UP AT NIGHT OR EARLIER THAN USUAL IN THE MORNING: NOT AT ALL
EMERGENCY_ROOM_LAST_YEAR_TOTAL: ONE
QUESTION_2 LAST FOUR WEEKS HOW OFTEN HAVE YOU HAD SHORTNESS OF BREATH: NOT AT ALL
ACT_TOTALSCORE: 25
ACT_TOTALSCORE: 25
QUESTION_1 LAST FOUR WEEKS HOW MUCH OF THE TIME DID YOUR ASTHMA KEEP YOU FROM GETTING AS MUCH DONE AT WORK, SCHOOL OR AT HOME: NONE OF THE TIME

## 2022-10-28 ASSESSMENT — PAIN SCALES - GENERAL: PAINLEVEL: NO PAIN (0)

## 2022-10-28 NOTE — PATIENT INSTRUCTIONS
To lab    After lab ok to go    This is a preventative visit and any additional concerns or chronic disease management including medication refills addressed today could be charged additionally.    Preventative visits screen for diseases prior to they occur.  They do not cover for any new diagnosis or chronic disease management.     If you have questions regarding your coverage please check with your insurance provider.  At [facility] we need to code correctly to be in compliance with all insurance companies.    Check home BPs from time to time    Ultrasound thyroid [phone] to schedule    Goal BP <140/<90 always, ideal <130/<80  Check blood pressures about once per week and write them down and bring them with to next clinic visit.   Low salt diet. DASH diet has sample menu plans.  Labs once per year to check kidneys and electrolytes.    Goal; up to 3 days a week treadmill  Increase veggies and decrease carbs  Noom supriya    Monitor for bleeding.      Preventive Health Recommendations  Female Ages 40 to 49    Yearly exam:   See your health care provider every year in order to  Review health changes.   Discuss preventive care.    Review your medicines if your doctor prescribed any.    Get a Pap test every three years (unless you have an abnormal result and your provider advises testing more often).    If you get Pap tests with HPV test, you only need to test every 5 years, unless you have an abnormal result. You do not need a Pap test if your uterus was removed (hysterectomy) and you have not had cancer.    You should be tested each year for STDs (sexually transmitted diseases), if you're at risk.   Ask your doctor if you should have a mammogram. Plan every 2 years.    Have a colonoscopy (test for colon cancer) if someone in your family has had colon cancer or polyps before age 45.     Have a cholesterol test every 5 years.     Have a diabetes test (fasting glucose) after age 45. If you are at risk for diabetes, you  should have this test every 3 years.    Shots: Get a flu shot each year. Get a tetanus shot every 10 years.     Nutrition:   Eat at least 5 servings of fruits and vegetables each day.  Eat whole-grain bread, whole-wheat pasta and brown rice instead of white grains and rice.  Get adequate Calcium and Vitamin D.      Lifestyle  Exercise at least 150 minutes a week (an average of 30 minutes a day, 5 days a week). This will help you control your weight and prevent disease.  Limit alcohol to one drink per day.  No smoking.   Wear sunscreen to prevent skin cancer.  See your dentist every six months for an exam and cleaning.
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
## 2022-10-28 NOTE — LETTER
October 31, 2022      Summer Smith  [address] [place] [address]        Dear ,    We are writing to inform you of your test results.    TSH thyroid test is normal.  Cholesterol was high, not too high yet to need medication. We'll work on healthfulness over the next year and recheck again next year.  To improve your cholesterol exercise 30 minutes per day five days a week, increase eating fresh or frozen fruits and vegetables at least 5 per day, increase plant protein instead of animal protein and when you do eat lean meats like fish, and avoid fried foods.  Hemoglobin is normal.       The 10-year ASCVD risk score (Jorje LYNN, et al., 2019) is: 1.5%    Values used to calculate the score:      Age: 42 years      Sex: Female      Is Non- : No      Diabetic: No      Tobacco smoker: No      Systolic Blood Pressure: 142 mmHg      Is BP treated: No      HDL Cholesterol: 47 mg/dL      Total Cholesterol: 249 mg/dL    Resulted Orders   TSH with free T4 reflex   Result Value Ref Range    TSH 2.07 0.30 - 4.20 uIU/mL   Lipid panel reflex to direct LDL Fasting   Result Value Ref Range    Cholesterol 249 (H) <200 mg/dL    Triglycerides 93 <150 mg/dL    Direct Measure HDL 47 (L) >=50 mg/dL    LDL Cholesterol Calculated 183 (H) <=100 mg/dL    Non HDL Cholesterol 202 (H) <130 mg/dL    Narrative    Cholesterol  Desirable:  <200 mg/dL    Triglycerides  Normal:  Less than 150 mg/dL  Borderline High:  150-199 mg/dL  High:  200-499 mg/dL  Very High:  Greater than or equal to 500 mg/dL    Direct Measure HDL  Female:  Greater than or equal to 50 mg/dL   Male:  Greater than or equal to 40 mg/dL    LDL Cholesterol  Desirable:  <100mg/dL  Above Desirable:  100-129 mg/dL   Borderline High:  130-159 mg/dL   High:  160-189 mg/dL   Very High:  >= 190 mg/dL    Non HDL Cholesterol  Desirable:  130 mg/dL  Above Desirable:  130-159 mg/dL  Borderline High:  160-189 mg/dL  High:  190-219 mg/dL  Very  High:  Greater than or equal to 220 mg/dL   Hemoglobin   Result Value Ref Range    Hemoglobin 15.1 11.7 - 15.7 g/dL       If you have any questions or concerns, please call the clinic at the number listed above.       Sincerely,      Arturo Brooks MD

## 2022-11-04 ENCOUNTER — HOSPITAL ENCOUNTER (OUTPATIENT)
Dept: ULTRASOUND IMAGING | Facility: CLINIC | Age: 42
Discharge: HOME | End: 2022-11-04
Attending: FAMILY MEDICINE | Admitting: FAMILY MEDICINE
Payer: COMMERCIAL

## 2022-11-04 DIAGNOSIS — E04.1 THYROID NODULE: ICD-10-CM

## 2022-11-04 PROCEDURE — 76536 US EXAM OF HEAD AND NECK: CPT

## 2023-01-18 ENCOUNTER — MYC MEDICAL ADVICE (OUTPATIENT)
Dept: FAMILY MEDICINE | Facility: CLINIC | Age: 43
End: 2023-01-18
Payer: COMMERCIAL

## 2023-01-18 DIAGNOSIS — M72.2 PLANTAR FASCIITIS: Primary | ICD-10-CM

## 2023-01-19 NOTE — TELEPHONE ENCOUNTER
Dr Brooks,    Please see pt's mychart msg & advise.  I have pended a podiatry referral for your consideration.    Maira Everett RN

## 2023-01-25 ENCOUNTER — OFFICE VISIT (OUTPATIENT)
Dept: PODIATRY | Facility: CLINIC | Age: 43
End: 2023-01-25
Attending: FAMILY MEDICINE
Payer: COMMERCIAL

## 2023-01-25 VITALS
BODY MASS INDEX: 46.82 KG/M2 | SYSTOLIC BLOOD PRESSURE: 170 MMHG | DIASTOLIC BLOOD PRESSURE: 84 MMHG | HEART RATE: 58 BPM | WEIGHT: 281 LBS | HEIGHT: 65 IN

## 2023-01-25 DIAGNOSIS — M72.2 PLANTAR FASCIITIS, RIGHT: Primary | ICD-10-CM

## 2023-01-25 PROCEDURE — 99203 OFFICE O/P NEW LOW 30 MIN: CPT | Performed by: PODIATRIST

## 2023-01-25 RX ORDER — METHYLPREDNISOLONE 4 MG
4 TABLET, DOSE PACK ORAL SEE ADMIN INSTRUCTIONS
Qty: 21 TABLET | Refills: 0 | Status: SHIPPED | OUTPATIENT
Start: 2023-01-25 | End: 2023-04-13

## 2023-01-25 ASSESSMENT — PAIN SCALES - GENERAL: PAINLEVEL: EXTREME PAIN (9)

## 2023-01-25 NOTE — PROGRESS NOTES
PATIENT HISTORY:  Summer Smith is a 42 year old female who presents to clinic in consultation at the request of  Arturo Brooks M.D. with a chief complaint of right heel pain.  The patient is seen by themselves.  The patient relates the pain is primarily located around the bottom of the heel.  Reports insidious onset without acute precipitating event. that has been going on for over 1 year(s).  The patient has previously tried stretching, exercises, NSAIDS, night splint, and ice with little relief.  Prior history of similar pain/issues ~ 10 years ago..  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  Any previous notes and studies that pertain to the patient's condition were reviewed.    Pertinent medical, surgical and family history was reviewed in the [facility] chart.    Past Medical History:   Past Medical History:   Diagnosis Date     Asthma      Cardiac murmur      DVT (deep vein thrombosis) in pregnancy 2007     DVT (deep venous thrombosis) (H) 2007     May-Thurner syndrome      Menorrhagia      Pulmonary embolism (H) 2007    no long term anticoagulation, no recurrence     Pulmonary embolism (H) 2007       Medications:   Current Outpatient Medications:      albuterol (PROAIR HFA/PROVENTIL HFA/VENTOLIN HFA) 108 (90 Base) MCG/ACT inhaler, Inhale 2 puffs into the lungs every 4 hours as needed for wheezing, Disp: 18 g, Rfl: 1     celecoxib (CELEBREX) 100 MG capsule, Take 1 capsule (100 mg) by mouth 2 times daily as needed for moderate pain, Disp: 60 capsule, Rfl: 3     Cetirizine HCl (ZYRTEC ALLERGY PO), seasonal, Disp: , Rfl:      fluticasone (FLONASE) 50 MCG/ACT nasal spray, Spray 1-2 sprays into both nostrils daily (Patient taking differently: Spray 1-2 sprays into both nostrils daily seasonal), Disp: 16 g, Rfl: 0     LEVOCARNITINE PO, Take 1,000 mg by mouth 2 times daily Over the counter, Disp: , Rfl:      methylPREDNISolone (MEDROL DOSEPAK) 4 MG tablet therapy pack, Take 1 tablet (4 mg) by mouth  "See Admin Instructions Tapered dose, Disp: 21 tablet, Rfl: 0     Multiple Vitamin (MULTIVITAMIN ADULT PO), , Disp: , Rfl:      order for DME, Equipment being ordered: thigh high compression socks 25-35 mm Hg.  Please measure, Disp: 1 each, Rfl: 11     rivaroxaban ANTICOAGULANT (XARELTO ANTICOAGULANT) 20 MG TABS tablet, Take 1 tablet (20 mg) by mouth daily (with dinner), Disp: 90 tablet, Rfl: 3     vitamin D3 (CHOLECALCIFEROL) 2000 units (50 mcg) tablet, Take 2 tablets by mouth daily, Disp: , Rfl:      Allergies:    Allergies   Allergen Reactions     Sulfa Drugs Hives     Vitamin K Anaphylaxis     Vit K IV only     Penicillin G Rash     Amoxicillin-Pot Clavulanate Rash       Vitals: BP (!) 170/84   Pulse 58   Ht 1.654 m (5' 5.12\")   Wt 127.5 kg (281 lb)   LMP 08/01/2016   BMI 46.59 kg/m    BMI= Body mass index is 46.59 kg/m .    LOWER EXTREMITY PHYSICAL EXAM    Dermatologic: Skin is intact to right lower extremity without significant lesions, rash or abrasion.        Vascular: DP & PT pulses are intact & regular on the right.   CFT and skin temperature is normal to the right lower extremity.     Neurologic: Lower extremity sensation is intact to light touch.  No evidence of weakness in the right lower extremity.        Musculoskeletal: Patient is ambulatory without assistive device or brace.  No gross ankle deformity noted.  No foot or ankle joint effusion is noted.  Noted pain on palpation along the plantar aspect of the right heel near the insertion point of the plantar fascia.  No surrounding erythema noted.  Noted tight gastroc complex on the right lower extremity.         ASSESSMENT / PLAN:     ICD-10-CM    1. Plantar fasciitis, right  M72.2 Ankle/Foot Bracing Supplies DME Foot Insert; Right     methylPREDNISolone (MEDROL DOSEPAK) 4 MG tablet therapy pack          I have explained to Summer about the conditions.  We discussed the underlying contributing factors to the condition as well as treatment options " along with expected length of recovery.  At this time, the patient was educated on the importance of offloading supportive shoes and other devices.  I demonstrated to the patient calf stretches to perform every hour daily until symptoms resolve.  After symptoms resolve, the patient was advised to perform the stretches 3 times daily to prevent future recurrence.  The patient was instructed to perform warm soaks with Epson salt after which to also apply over-the-counter Voltaren gel to deeply massage the injured tissue.  The patient was instructed to do this on a daily basis until symptoms resolve.   The patient was fitted with a Dynaflex insert that will aid in offloading the tension forces to the soft tissues and prevent further inflammation.  To reduce the amount of current inflammation, the patient was prescribed a Medrol Dosepak to be taken daily with food and instructed to stop taking if any stomach irritation or swelling in extremities are noted.  The patient may return in four weeks for reevaluation to determine if any further treatment will be needed.      Summer verbalized agreement with and understanding of the rational for the diagnosis and treatment plan.  All questions were answered to best of my ability and the patient's satisfaction. The patient was advised to contact the clinic with any questions that may arise after the clinic visit.      Disclaimer: This note consists of symbols derived from keyboarding, dictation and/or voice recognition software. As a result, there may be errors in the script that have gone undetected. Please consider this when interpreting information found in this chart.       RAMILA Fernández D.P.M., CARLOS.F.A.S.

## 2023-01-25 NOTE — LETTER
1/25/2023         RE: Summer Smith  [id number]  [facility] [address]        Dear Colleague,    Thank you for referring your patient, Summer Smith, to the [facility]. Please see a copy of my visit note below.    PATIENT HISTORY:  Summer Smith is a 42 year old female who presents to clinic in consultation at the request of  Arturo Brooks M.D. with a chief complaint of right heel pain.  The patient is seen by themselves.  The patient relates the pain is primarily located around the bottom of the heel.  Reports insidious onset without acute precipitating event. that has been going on for over 1 year(s).  The patient has previously tried stretching, exercises, NSAIDS, night splint, and ice with little relief.  Prior history of similar pain/issues ~ 10 years ago..  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  Any previous notes and studies that pertain to the patient's condition were reviewed.    Pertinent medical, surgical and family history was reviewed in the Epic chart.    Past Medical History:   Past Medical History:   Diagnosis Date     Asthma      Cardiac murmur      DVT (deep vein thrombosis) in pregnancy 2007     DVT (deep venous thrombosis) (H) 2007     May-Thurner syndrome      Menorrhagia      Pulmonary embolism (H) 2007    no long term anticoagulation, no recurrence     Pulmonary embolism (H) 2007       Medications:   Current Outpatient Medications:      albuterol (PROAIR HFA/PROVENTIL HFA/VENTOLIN HFA) 108 (90 Base) MCG/ACT inhaler, Inhale 2 puffs into the lungs every 4 hours as needed for wheezing, Disp: 18 g, Rfl: 1     celecoxib (CELEBREX) 100 MG capsule, Take 1 capsule (100 mg) by mouth 2 times daily as needed for moderate pain, Disp: 60 capsule, Rfl: 3     Cetirizine HCl (ZYRTEC ALLERGY PO), seasonal, Disp: , Rfl:      fluticasone (FLONASE) 50 MCG/ACT nasal spray, Spray 1-2 sprays into both nostrils daily (Patient taking  "differently: Spray 1-2 sprays into both nostrils daily seasonal), Disp: 16 g, Rfl: 0     LEVOCARNITINE PO, Take 1,000 mg by mouth 2 times daily Over the counter, Disp: , Rfl:      methylPREDNISolone (MEDROL DOSEPAK) 4 MG tablet therapy pack, Take 1 tablet (4 mg) by mouth See Admin Instructions Tapered dose, Disp: 21 tablet, Rfl: 0     Multiple Vitamin (MULTIVITAMIN ADULT PO), , Disp: , Rfl:      order for DME, Equipment being ordered: thigh high compression socks 25-35 mm Hg.  Please measure, Disp: 1 each, Rfl: 11     rivaroxaban ANTICOAGULANT (XARELTO ANTICOAGULANT) 20 MG TABS tablet, Take 1 tablet (20 mg) by mouth daily (with dinner), Disp: 90 tablet, Rfl: 3     vitamin D3 (CHOLECALCIFEROL) 2000 units (50 mcg) tablet, Take 2 tablets by mouth daily, Disp: , Rfl:      Allergies:    Allergies   Allergen Reactions     Sulfa Drugs Hives     Vitamin K Anaphylaxis     Vit K IV only     Penicillin G Rash     Amoxicillin-Pot Clavulanate Rash       Vitals: BP (!) 170/84   Pulse 58   Ht 1.654 m (5' 5.12\")   Wt 127.5 kg (281 lb)   LMP 08/01/2016   BMI 46.59 kg/m    BMI= Body mass index is 46.59 kg/m .    LOWER EXTREMITY PHYSICAL EXAM    Dermatologic: Skin is intact to right lower extremity without significant lesions, rash or abrasion.        Vascular: DP & PT pulses are intact & regular on the right.   CFT and skin temperature is normal to the right lower extremity.     Neurologic: Lower extremity sensation is intact to light touch.  No evidence of weakness in the right lower extremity.        Musculoskeletal: Patient is ambulatory without assistive device or brace.  No gross ankle deformity noted.  No foot or ankle joint effusion is noted.  Noted pain on palpation along the plantar aspect of the right heel near the insertion point of the plantar fascia.  No surrounding erythema noted.  Noted tight gastroc complex on the right lower extremity.         ASSESSMENT / PLAN:     ICD-10-CM    1. Plantar fasciitis, right  " M72.2 Ankle/Foot Bracing Supplies DME Foot Insert; Right     methylPREDNISolone (MEDROL DOSEPAK) 4 MG tablet therapy pack          I have explained to Summer about the conditions.  We discussed the underlying contributing factors to the condition as well as treatment options along with expected length of recovery.  At this time, the patient was educated on the importance of offloading supportive shoes and other devices.  I demonstrated to the patient calf stretches to perform every hour daily until symptoms resolve.  After symptoms resolve, the patient was advised to perform the stretches 3 times daily to prevent future recurrence.  The patient was instructed to perform warm soaks with Epson salt after which to also apply over-the-counter Voltaren gel to deeply massage the injured tissue.  The patient was instructed to do this on a daily basis until symptoms resolve.   The patient was fitted with a Dynaflex insert that will aid in offloading the tension forces to the soft tissues and prevent further inflammation.  To reduce the amount of current inflammation, the patient was prescribed a Medrol Dosepak to be taken daily with food and instructed to stop taking if any stomach irritation or swelling in extremities are noted.  The patient may return in four weeks for reevaluation to determine if any further treatment will be needed.      Summer verbalized agreement with and understanding of the rational for the diagnosis and treatment plan.  All questions were answered to best of my ability and the patient's satisfaction. The patient was advised to contact the clinic with any questions that may arise after the clinic visit.      Disclaimer: This note consists of symbols derived from keyboarding, dictation and/or voice recognition software. As a result, there may be errors in the script that have gone undetected. Please consider this when interpreting information found in this chart.       RAMILA Fernández D.P.M.,  SOFIE.[email or web address].S.        Again, thank you for allowing me to participate in the care of your patient.        Sincerely,        Louie Fernández DPM

## 2023-01-25 NOTE — PATIENT INSTRUCTIONS

## 2023-01-25 NOTE — NURSING NOTE
"Chief Complaint   Patient presents with     Right Foot - Consult     Heel pain       Initial BP (!) 170/84   Pulse 58   Ht 1.654 m (5' 5.12\")   Wt 127.5 kg (281 lb)   LMP 08/01/2016   BMI 46.59 kg/m   Estimated body mass index is 46.59 kg/m  as calculated from the following:    Height as of this encounter: 1.654 m (5' 5.12\").    Weight as of this encounter: 127.5 kg (281 lb).  Medications and allergies reviewed.      Gabby DUONG MA    "

## 2023-01-31 DIAGNOSIS — Z86.711 HISTORY OF PULMONARY EMBOLISM: Primary | ICD-10-CM

## 2023-02-02 ENCOUNTER — LAB (OUTPATIENT)
Dept: LAB | Facility: CLINIC | Age: 43
End: 2023-02-02
Payer: COMMERCIAL

## 2023-02-02 DIAGNOSIS — Z86.711 HISTORY OF PULMONARY EMBOLISM: ICD-10-CM

## 2023-02-02 LAB
ALBUMIN SERPL BCG-MCNC: 4 G/DL (ref 3.5–5.2)
ALP SERPL-CCNC: 65 U/L (ref 35–104)
ALT SERPL W P-5'-P-CCNC: 25 U/L (ref 10–35)
ANION GAP SERPL CALCULATED.3IONS-SCNC: 12 MMOL/L (ref 7–15)
AST SERPL W P-5'-P-CCNC: 22 U/L (ref 10–35)
BILIRUB SERPL-MCNC: 0.2 MG/DL
BUN SERPL-MCNC: 16.2 MG/DL (ref 6–20)
CALCIUM SERPL-MCNC: 9.6 MG/DL (ref 8.6–10)
CHLORIDE SERPL-SCNC: 100 MMOL/L (ref 98–107)
CK SERPL-CCNC: 74 U/L (ref 26–192)
CREAT SERPL-MCNC: 1.05 MG/DL (ref 0.51–0.95)
DEPRECATED HCO3 PLAS-SCNC: 27 MMOL/L (ref 22–29)
GFR SERPL CREATININE-BSD FRML MDRD: 68 ML/MIN/1.73M2
GLUCOSE SERPL-MCNC: 99 MG/DL (ref 70–99)
POTASSIUM SERPL-SCNC: 4.2 MMOL/L (ref 3.4–5.3)
PROT SERPL-MCNC: 7.4 G/DL (ref 6.4–8.3)
SODIUM SERPL-SCNC: 139 MMOL/L (ref 136–145)

## 2023-02-02 PROCEDURE — 99000 SPECIMEN HANDLING OFFICE-LAB: CPT

## 2023-02-02 PROCEDURE — 82550 ASSAY OF CK (CPK): CPT

## 2023-02-02 PROCEDURE — 80053 COMPREHEN METABOLIC PANEL: CPT

## 2023-02-02 PROCEDURE — 36415 COLL VENOUS BLD VENIPUNCTURE: CPT

## 2023-02-08 LAB
ACYLCARNITINE SERPL-SCNC: 7 UMOL/L
CARN ESTERS/C0 SERPL-SRTO: 0.2 {RATIO}
CARNITINE FREE SERPL-SCNC: 36 UMOL/L
CARNITINE SERPL-SCNC: 43 UMOL/L

## 2023-02-20 ENCOUNTER — OFFICE VISIT (OUTPATIENT)
Dept: PODIATRY | Facility: CLINIC | Age: 43
End: 2023-02-20
Payer: COMMERCIAL

## 2023-02-20 VITALS
SYSTOLIC BLOOD PRESSURE: 138 MMHG | BODY MASS INDEX: 46.82 KG/M2 | HEIGHT: 65 IN | WEIGHT: 281 LBS | DIASTOLIC BLOOD PRESSURE: 93 MMHG | HEART RATE: 74 BPM

## 2023-02-20 DIAGNOSIS — M72.2 PLANTAR FASCIITIS, RIGHT: Primary | ICD-10-CM

## 2023-02-20 PROCEDURE — 99213 OFFICE O/P EST LOW 20 MIN: CPT | Performed by: PODIATRIST

## 2023-02-20 ASSESSMENT — PAIN SCALES - GENERAL: PAINLEVEL: MODERATE PAIN (4)

## 2023-02-20 NOTE — PROGRESS NOTES
"Summer returns to the office for reevaluation of the right foot.  The patient relates following the instructions given at the last visit with noted overall less pain and more improvement in function of the right foot.   The patient relates no other problems.    Vitals: BP (!) 138/93   Pulse 74   Ht 1.654 m (5' 5.12\")   Wt 127.5 kg (281 lb)   LMP 08/01/2016   BMI 46.59 kg/m    BMI= Body mass index is 46.59 kg/m .    Lower Extremity Physical Exam:      Neurovascular status remains unchanged.  Muscular exam is within normal limits to major muscle groups.  Integument is intact.      Noted decreased pain on palpation on the plantar aspect of the right heel.         Assessment:     ICD-10-CM    1. Plantar fasciitis, right  M72.2           Plan:    I have explained to Summer about the progress of the conditions.  At this time, the patient was encouraged to continue wearing offloading supportive shoes and other devices.  The patient was encouraged to perform calf stretches 3 times daily to prevent future recurrence.  The patient was instructed to continue to perform warm soaks with Epson salt after which to also apply over-the-counter Voltaren gel to deeply massage the injured tissue.   The patient may return in four weeks for reevaluation if problems persist to determine if any further treatment will be needed.      Summer verbalized agreement with and understanding of the rational for the diagnosis and treatment plan.  All questions were answered to best of my ability and the patient's satisfaction. The patient was advised to contact the clinic with any questions that may arise after the clinic visit.      Disclaimer: This note consists of symbols derived from keyboarding, dictation and/or voice recognition software. As a result, there may be errors in the script that have gone undetected. Please consider this when interpreting information found in this chart.       RAMILA Fernández D.P.M., F.OXANA.F.A.S.    "

## 2023-02-20 NOTE — LETTER
"    2/20/2023         RE: Summer Smith  [id number]  [facility] [address]        Dear Colleague,    Thank you for referring your patient, Summer Smith, to the [facility] [place]. Please see a copy of my visit note below.    Summer returns to the office for reevaluation of the right foot.  The patient relates following the instructions given at the last visit with noted overall less pain and more improvement in function of the right foot.   The patient relates no other problems.    Vitals: BP (!) 138/93   Pulse 74   Ht 1.654 m (5' 5.12\")   Wt 127.5 kg (281 lb)   LMP 08/01/2016   BMI 46.59 kg/m    BMI= Body mass index is 46.59 kg/m .    Lower Extremity Physical Exam:      Neurovascular status remains unchanged.  Muscular exam is within normal limits to major muscle groups.  Integument is intact.      Noted decreased pain on palpation on the plantar aspect of the right heel.         Assessment:     ICD-10-CM    1. Plantar fasciitis, right  M72.2           Plan:    I have explained to Summer about the progress of the conditions.  At this time, the patient was encouraged to continue wearing offloading supportive shoes and other devices.  The patient was encouraged to perform calf stretches 3 times daily to prevent future recurrence.  The patient was instructed to continue to perform warm soaks with Epson salt after which to also apply over-the-counter Voltaren gel to deeply massage the injured tissue.   The patient may return in four weeks for reevaluation if problems persist to determine if any further treatment will be needed.      Summer verbalized agreement with and understanding of the rational for the diagnosis and treatment plan.  All questions were answered to best of my ability and the patient's satisfaction. The patient was advised to contact the clinic with any questions that may arise after the clinic visit.      Disclaimer: This note consists of symbols derived " from keyboarding, dictation and/or voice recognition software. As a result, there may be errors in the script that have gone undetected. Please consider this when interpreting information found in this chart.       RAMILA Fernández D.P.M., F.A.C.F.A.S.        Again, thank you for allowing me to participate in the care of your patient.        Sincerely,        Louie Fernández DPM

## 2023-02-20 NOTE — NURSING NOTE
"Chief Complaint   Patient presents with     RECHECK     50% better- right foot       Initial BP (!) 138/93   Pulse 74   Ht 1.654 m (5' 5.12\")   Wt 127.5 kg (281 lb)   LMP 08/01/2016   BMI 46.59 kg/m   Estimated body mass index is 46.59 kg/m  as calculated from the following:    Height as of this encounter: 1.654 m (5' 5.12\").    Weight as of this encounter: 127.5 kg (281 lb).  Medications and allergies reviewed.      Gabby DUONG MA    "

## 2023-04-13 ENCOUNTER — OFFICE VISIT (OUTPATIENT)
Dept: FAMILY MEDICINE | Facility: CLINIC | Age: 43
End: 2023-04-13
Payer: COMMERCIAL

## 2023-04-13 VITALS
TEMPERATURE: 98.8 F | OXYGEN SATURATION: 97 % | SYSTOLIC BLOOD PRESSURE: 138 MMHG | BODY MASS INDEX: 48.15 KG/M2 | DIASTOLIC BLOOD PRESSURE: 92 MMHG | WEIGHT: 289 LBS | HEIGHT: 65 IN | HEART RATE: 94 BPM

## 2023-04-13 DIAGNOSIS — J06.9 VIRAL UPPER RESPIRATORY TRACT INFECTION: Primary | ICD-10-CM

## 2023-04-13 DIAGNOSIS — J02.9 SORE THROAT: ICD-10-CM

## 2023-04-13 PROBLEM — I48.91 ATRIAL FIBRILLATION WITH RAPID VENTRICULAR RESPONSE (H): Status: ACTIVE | Noted: 2023-04-13

## 2023-04-13 PROBLEM — K76.0 HEPATIC STEATOSIS: Status: ACTIVE | Noted: 2020-01-16

## 2023-04-13 LAB
DEPRECATED S PYO AG THROAT QL EIA: NEGATIVE
GROUP A STREP BY PCR: NOT DETECTED

## 2023-04-13 PROCEDURE — 87651 STREP A DNA AMP PROBE: CPT | Performed by: NURSE PRACTITIONER

## 2023-04-13 PROCEDURE — 99213 OFFICE O/P EST LOW 20 MIN: CPT | Performed by: NURSE PRACTITIONER

## 2023-04-13 ASSESSMENT — ASTHMA QUESTIONNAIRES
QUESTION_2 LAST FOUR WEEKS HOW OFTEN HAVE YOU HAD SHORTNESS OF BREATH: ONCE OR TWICE A WEEK
ACT_TOTALSCORE: 21
QUESTION_3 LAST FOUR WEEKS HOW OFTEN DID YOUR ASTHMA SYMPTOMS (WHEEZING, COUGHING, SHORTNESS OF BREATH, CHEST TIGHTNESS OR PAIN) WAKE YOU UP AT NIGHT OR EARLIER THAN USUAL IN THE MORNING: ONCE OR TWICE
QUESTION_5 LAST FOUR WEEKS HOW WOULD YOU RATE YOUR ASTHMA CONTROL: COMPLETELY CONTROLLED
QUESTION_4 LAST FOUR WEEKS HOW OFTEN HAVE YOU USED YOUR RESCUE INHALER OR NEBULIZER MEDICATION (SUCH AS ALBUTEROL): TWO OR THREE TIMES PER WEEK
QUESTION_1 LAST FOUR WEEKS HOW MUCH OF THE TIME DID YOUR ASTHMA KEEP YOU FROM GETTING AS MUCH DONE AT WORK, SCHOOL OR AT HOME: NONE OF THE TIME
ACT_TOTALSCORE: 21

## 2023-04-13 ASSESSMENT — PAIN SCALES - GENERAL: PAINLEVEL: NO PAIN (0)

## 2023-04-13 NOTE — PATIENT INSTRUCTIONS
Handout given on sore throat symptom management.  I will notify you if the throat culture comes back positive for treatment.  Follow-up if any persistent or worsening symptoms.

## 2023-04-13 NOTE — PROGRESS NOTES
Assessment & Plan     Viral upper respiratory tract infection  Rapid strep is negative and culture is negative handout given on sore throat management.  Recommend follow-up if any worsening or persistent symptoms.    Sore throat  - Streptococcus A Rapid Screen w/Reflex to PCR - Clinic Collect  - Group A Streptococcus PCR Throat Swab     See Patient Instructions    Henrietta Church NP  [facility] GADIEL Wheeler is a 42 year old, presenting for the following health issues:  Pharyngitis (Cough/) and Cough        4/13/2023     1:43 PM   Additional Questions   Roomed by roque casanova   Accompanied by self         4/13/2023     1:43 PM   Patient Reported Additional Medications   Patient reports taking the following new medications none     History of Present Illness       Reason for visit:  Sore throat and cough  Symptom onset:  3-7 days ago  Symptoms include:  Sore throat difficulty swallowing cough  Symptom intensity:  Moderate  Symptom progression:  Worsening  Had these symptoms before:  Yes  Has tried/received treatment for these symptoms:  Yes  Previous treatment was successful:  Yes  Prior treatment description:  Albuterol prednisone  What makes it worse:  No  What makes it better:  Tea lemon    She eats 2-3 servings of fruits and vegetables daily.She consumes 1 sweetened beverage(s) daily.She exercises with enough effort to increase her heart rate 9 or less minutes per day.  She exercises with enough effort to increase her heart rate 3 or less days per week. She is missing 2 dose(s) of medications per week.       Acute Illness  Acute illness concerns: sore throat cough  Onset/Duration: 3 days ago  Symptoms:  Fever: No  Chills/Sweats: YES  Headache (location?): YES  Sinus Pressure: No  Conjunctivitis:  No  Ear Pain: YES- when swallowing  Rhinorrhea: No  Congestion: No  Sore Throat: YES  Cough: YES-non-productive  Wheeze: YES  Decreased Appetite: No  Nausea: No  Vomiting: No  Diarrhea:  "No  Dysuria/Freq.: No  Dysuria or Hematuria: No  Fatigue/Achiness: YES  Sick/Strep Exposure: YES  Therapies tried and outcome: None    Tested negative for COVID 19 this morning.  Pain in ears with swallowing.  Body aches in neck and shoulders mostly but all over.  Has fatigue and wheezing/cough which started last night with gasping.  Felt like she couldn't move air and then used albuterol and this improved.  No temperatures but chills last night.  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    Review of Systems   CONSTITUTIONAL:POSITIVE  for chills and fatigue  ENT/MOUTH: POSITIVE for sore throat  RESP:POSITIVE for cough-non productive, wheezing  CV: NEGATIVE for chest pain, palpitations or peripheral edema  PSYCHIATRIC: NEGATIVE for changes in mood or affect  ROS otherwise negative      Objective    Pulse 94   Temp 98.8  F (37.1  C) (Tympanic)   Ht 1.676 m (5' 6\")   LMP 08/01/2016   SpO2 97%   BMI 45.35 kg/m    Body mass index is 45.35 kg/m .  Physical Exam   GENERAL: healthy, alert and no distress  HENT: ear canals and TM's normal, nose and mouth without ulcers or lesions  NECK: no adenopathy and no asymmetry, masses, or scars  RESP: lungs clear to auscultation - no rales, rhonchi or wheezes  CV: regular rate and rhythm, normal S1 S2, no S3 or S4, no murmur, click or rub, no peripheral edema and peripheral pulses strong  PSYCH: mentation appears normal, affect normal/bright    Results for orders placed or performed in visit on 04/13/23 (from the past 24 hour(s))   Streptococcus A Rapid Screen w/Reflex to PCR - Clinic Collect    Specimen: Throat; Swab   Result Value Ref Range    Group A Strep antigen Negative Negative   Group A Streptococcus PCR Throat Swab    Specimen: Throat; Swab   Result Value Ref Range    Group A strep by PCR Not Detected Not Detected    Narrative    The Xpert Xpress Strep A test, performed on the [employer], is a rapid, qualitative in vitro diagnostic test for the " detection of Streptococcus pyogenes (Group A ß-hemolytic Streptococcus, Strep A) in throat swab specimens from patients with signs and symptoms of pharyngitis. The Xpert Xpress Strep A test can be used as an aid in the diagnosis of Group A Streptococcal pharyngitis. The assay is not intended to monitor treatment for Group A Streptococcus infections. The Xpert Xpress Strep A test utilizes an automated real-time polymerase chain reaction (PCR) to detect Streptococcus pyogenes DNA.

## 2023-04-16 ENCOUNTER — APPOINTMENT (OUTPATIENT)
Dept: GENERAL RADIOLOGY | Facility: CLINIC | Age: 43
End: 2023-04-16
Attending: PHYSICIAN ASSISTANT
Payer: COMMERCIAL

## 2023-04-16 ENCOUNTER — HOSPITAL ENCOUNTER (EMERGENCY)
Facility: CLINIC | Age: 43
Discharge: HOME | End: 2023-04-16
Attending: PHYSICIAN ASSISTANT | Admitting: PHYSICIAN ASSISTANT
Payer: COMMERCIAL

## 2023-04-16 VITALS
OXYGEN SATURATION: 98 % | TEMPERATURE: 97.1 F | SYSTOLIC BLOOD PRESSURE: 166 MMHG | DIASTOLIC BLOOD PRESSURE: 90 MMHG | RESPIRATION RATE: 20 BRPM | HEART RATE: 88 BPM

## 2023-04-16 DIAGNOSIS — J06.9 VIRAL URI: ICD-10-CM

## 2023-04-16 DIAGNOSIS — J45.901 ASTHMA EXACERBATION: ICD-10-CM

## 2023-04-16 PROCEDURE — G0463 HOSPITAL OUTPT CLINIC VISIT: HCPCS | Mod: 25 | Performed by: PHYSICIAN ASSISTANT

## 2023-04-16 PROCEDURE — 71046 X-RAY EXAM CHEST 2 VIEWS: CPT

## 2023-04-16 PROCEDURE — 99213 OFFICE O/P EST LOW 20 MIN: CPT | Mod: 25 | Performed by: PHYSICIAN ASSISTANT

## 2023-04-16 RX ORDER — PREDNISONE 20 MG/1
TABLET ORAL
Qty: 10 TABLET | Refills: 0 | Status: SHIPPED | OUTPATIENT
Start: 2023-04-16 | End: 2023-08-01

## 2023-04-16 NOTE — ED PROVIDER NOTES
History     Chief Complaint   Patient presents with     Cough     Worsening. Strep test neg in clinic on Wednesday. Productive.     Shortness of Breath     Patient has asthma and is using inhaler every 2 hours.      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  Patient reports she developed URI symptoms with sore throat, nasal congestion, cough approximately 1 week ago.  She was evaluated in the clinic earlier in the week and had negative rapid strep test. She had tested negative for COVID-19 at home prior to that.  She states she was offered prednisone at the time however declined as her symptoms were not severe.  In the last 24 hours she had worsening of subjective fever, chills, myalgias, shortness of breath and wheezing.  She has been using her albuterol inhaler every 2 hours.      Allergies:  Allergies   Allergen Reactions     Sulfa Drugs Hives     Vitamin K Anaphylaxis     Vit K IV only     Penicillin G Rash     Amoxicillin-Pot Clavulanate Rash     Problem List:    Patient Active Problem List    Diagnosis Date Noted     Atrial fibrillation with rapid ventricular response (H) 04/13/2023     Priority: Medium     Thyroid nodule 03/12/2021     Priority: Medium     Repeat ultrasound needed March 2022       Hepatic steatosis 01/16/2020     Priority: Medium     Left leg swelling 01/03/2017     Priority: Medium     SOB (shortness of breath) 01/03/2017     Priority: Medium     History of pulmonary embolism 01/03/2017     Priority: Medium     Postoperative hemorrhage involving genitourinary system following genitourinary procedure 12/27/2016     Priority: Medium     Significant vaginal bleeding after hysterectomy, requiring hospitalization at [facility].       Morbid obesity due to  excess calories (H) 12/27/2016     Priority: Medium     History of hysterectomy 12/27/2016     Priority: Medium     Pericardial cyst 12/27/2016     Priority: Medium     Anticoagulation monitoring, INR range 2-3 10/31/2016     Priority: Medium     Mild intermittent reactive airway disease without complication 09/17/2015     Priority: Medium     Abnormal PFT 2012, started on maintenance inhaler - [facility]  Symptoms with URIs       Varicose veins of legs 09/17/2015     Priority: Medium     Laser surgery left leg 2012 and left iliac vein stent placement 2012       Personal history of DVT (deep vein thrombosis) 08/26/2015     Priority: Medium     In 2007. Due to May-Thurner syndrome and being on OCP.  On warfarin for 6 months.  With pregnancies she did heparin injections followed by post-partum warfarin.       Recurrent pulmonary emboli (H) 08/26/2015     Priority: Medium     2007 12/2016 after hysterectomy, complicated by vaginal bleeding, needing to be off anti-coagulation.  Following with Dr. White at [facility].  Recommends indefinite anticoagulation.        May-Thurner syndrome 08/26/2015     Priority: Medium     History of vein stenting in iliac/femoral area.        Past Medical History:    Past Medical History:   Diagnosis Date     Asthma      Cardiac murmur      DVT (deep vein thrombosis) in pregnancy 2007     DVT (deep venous thrombosis) (H) 2007     May-Thurner syndrome      Menorrhagia      Pulmonary embolism (H) 2007     Pulmonary embolism (H) 2007       Past Surgical History:    Past Surgical History:   Procedure Laterality Date     ABLATION SAPHENOUS VEIN W/ RFA       AS LIGATN SHORT SAPHEN      had great saphenous vein removal     DAVINCI HYSTERECTOMY TOTAL      tubes removed, ovaries in place     HYSTERECTOMY  11/03/2016     IR EXTREMITY VENOGRAM LEFT  12/1/2016     OTHER SURGICAL HISTORY  2009    stenting into pelvis     OTHER SURGICAL HISTORY Left     saphenous vein removed     OTHER SURGICAL  HISTORY      wisdom teeth extractions     STENT      venous in leg       Family History:    Family History   Problem Relation Age of Onset     Thyroid Disease Mother         partial lobectomy     Diabetes Father      Hypertension Father      Hyperlipidemia Father      Colon Cancer Maternal Grandfather      Liver Cancer Maternal Grandfather      Cancer Paternal Grandfather      Thyroid Disease Sister      Thyroid Disease Sister      No Known Problems Sister      No Known Problems Sister      Endocrine Disease Daughter         Lucille, has appointment end of July 2016     Autism Spectrum Disorder Daughter      Spina bifida Daughter        Social History:  Marital Status:   [2]  Social History     Tobacco Use     Smoking status: Never     Smokeless tobacco: Never   Vaping Use     Vaping status: Never Used   Substance Use Topics     Alcohol use: Yes     Comment: rare     Drug use: No        Medications:    albuterol (PROAIR HFA/PROVENTIL HFA/VENTOLIN HFA) 108 (90 Base) MCG/ACT inhaler  celecoxib (CELEBREX) 100 MG capsule  Cetirizine HCl (ZYRTEC ALLERGY PO)  fluticasone (FLONASE) 50 MCG/ACT nasal spray  LEVOCARNITINE PO  Multiple Vitamin (MULTIVITAMIN ADULT PO)  rivaroxaban ANTICOAGULANT (XARELTO ANTICOAGULANT) 20 MG TABS tablet  vitamin D3 (CHOLECALCIFEROL) 2000 units (50 mcg) tablet      Review of Systems  CONSTITUTIONAL:POSITIVE for subjective fever, chills, myalgias  INTEGUMENTARY/SKIN: NEGATIVE for worrisome rashes, moles or lesions  EYES: NEGATIVE for vision changes or irritation  ENT/MOUTH: POSITIVE for improved sore throat, nasal congestion  and NEGATIVE for ear pain   RESP:POSITIVE for cough, shortness of breath, wheezing   GI: NEGATIVE for abdominal pain, diarrhea, nausea and vomiting  Physical Exam   BP (!) 166/90   Pulse 88   Temp 97.1  F (36.2  C) (Tympanic)   Resp 20   LMP 08/01/2016   SpO2 98%   Physical Exam  GENERAL APPEARANCE: healthy, alert and no distress  EYES: EOMI,  PERRL, conjunctiva  clear  HENT: ear canals and TM's normal.  Nose and mouth without ulcers, erythema or lesions  NECK: supple, nontender, no lymphadenopathy  RESP: lungs clear to auscultation - no rales, rhonchi or wheezes, frequent cough present  CV: regular rates and rhythm, normal S1 S2, no murmur noted  SKIN: no suspicious lesions or rashes  ED Course           Procedures       Critical Care time:  none          Results for orders placed or performed during the hospital encounter of 04/16/23   Chest XR,  PA & LAT     Status: None    Narrative    EXAM: XR CHEST 2 VIEWS  LOCATION: [facility]  DATE/TIME: 4/16/2023 10:32 AM CDT    INDICATION: cough,  shortness of breath  COMPARISON: None.      Impression    IMPRESSION: Negative chest.     No results found for this or any previous visit (from the past 24 hour(s)).  Medications - No data to display    Assessments & Plan (with Medical Decision Making)     I have reviewed the nursing notes.  I have reviewed the findings, diagnosis, plan and need for follow up with the patient.         Discharge Medication List as of 4/16/2023 10:44 AM      START taking these medications    Details   predniSONE (DELTASONE) 20 MG tablet Take two tablets (= 40mg) each day for 5 (five) days, Disp-10 tablet, R-0, E-Prescribe           Final diagnoses:   Viral URI   Asthma exacerbation     42-year-old female with history of asthma on long-term anticoagulation due to history of recurrent PEs presents to the urgent care with concern over 1 week history of cough, shortness of breath, wheezing.  She had x-ray which is negative for acute infiltrate.  Symptoms are consistent with a viral URI causing asthma exacerbation.  I have low suspicion for pertussis, PE, MI/ACS however did discuss with/benefits of transfer to the emergency department for further evaluation and patient elected to decline.  She was discharged home with prescription for prednisone.  Follow up with PCP if no improvement  in 3-5 days. Worrisome reasons to return to ER/UC sooner discussed.     Disclaimer: This note consists of symbols derived from keyboarding, dictation, and/or voice recognition software. As a result, there may be errors in the script that have gone undetected.  Please consider this when interpreting information found in the chart.      4/16/2023   [facility] EMERGENCY DEPT     Purvi Gonzalez PA-C  04/22/23 0811

## 2023-04-20 ENCOUNTER — OFFICE VISIT (OUTPATIENT)
Dept: FAMILY MEDICINE | Facility: CLINIC | Age: 43
End: 2023-04-20
Payer: COMMERCIAL

## 2023-04-20 VITALS
SYSTOLIC BLOOD PRESSURE: 122 MMHG | HEART RATE: 73 BPM | OXYGEN SATURATION: 98 % | HEIGHT: 65 IN | BODY MASS INDEX: 48.15 KG/M2 | RESPIRATION RATE: 16 BRPM | TEMPERATURE: 98.4 F | DIASTOLIC BLOOD PRESSURE: 80 MMHG | WEIGHT: 289 LBS

## 2023-04-20 DIAGNOSIS — J20.9 ACUTE BRONCHITIS WITH SYMPTOMS > 10 DAYS: Primary | ICD-10-CM

## 2023-04-20 PROBLEM — R06.02 SOB (SHORTNESS OF BREATH): Status: RESOLVED | Noted: 2017-01-03 | Resolved: 2023-04-20

## 2023-04-20 PROCEDURE — 99213 OFFICE O/P EST LOW 20 MIN: CPT | Performed by: NURSE PRACTITIONER

## 2023-04-20 RX ORDER — AZITHROMYCIN 250 MG/1
TABLET, FILM COATED ORAL
Qty: 6 TABLET | Refills: 0 | Status: SHIPPED | OUTPATIENT
Start: 2023-04-20 | End: 2023-04-25

## 2023-04-20 ASSESSMENT — PAIN SCALES - GENERAL: PAINLEVEL: NO PAIN (0)

## 2023-04-20 NOTE — PROGRESS NOTES
"  Assessment & Plan     Acute bronchitis with symptoms > 10 days   The risks, benefits and treatment options of prescribed medications or other treatments have been discussed with the patient. The patient verbalized their understanding and should call or follow up if no improvement or if they develop further problems.    - azithromycin (ZITHROMAX) 250 MG tablet  Dispense: 6 tablet; Refill: 0         MED REC REQUIRED   Post Medication Reconciliation Status:       BMI:   Estimated body mass index is 48.09 kg/m  as calculated from the following:    Height as of this encounter: 1.651 m (5' 5\").    Weight as of this encounter: 131.1 kg (289 lb).   Weight management plan: Patient was referred to their PCP to discuss a diet and exercise plan.    See Patient Instructions    Maddy Portillo NP  [facility] Wheeler is a 42 year old, presenting for the following health issues:  ER F/U        4/20/2023    11:07 AM   Additional Questions   Roomed by Yelitza GARCIA   Accompanied by self     HPI     ED/UC Followup:    Facility:  [facility] Emergency Dept  Date of visit: 4/16/23  Reason for visit: Viral URI; Asthma exacerbation  Current Status: cough is worse, extreme fatigue    Seen in UC and given prednisone - this has helped with asthma.  Using inhaler now only twice daily vs every 2 hours as before.    Reports cough has not improved.  Now feeling very fatigued.  Having to sleep upright in bed due to cough.    Does taking Xarelto for PE history.    COVID home testing negative.  Symptoms started 10 days prior. No improvement.    Denies fever/chills, shortness of breath, chest pain or nausea/vomiting     Review of Systems   Constitutional, HEENT, cardiovascular, pulmonary, GI, , musculoskeletal, neuro, skin, endocrine and psych systems are negative, except as otherwise noted.      Objective    /80 (BP Location: Left arm, Patient Position: Sitting, Cuff Size: Adult Large)   " "Pulse 73   Temp 98.4  F (36.9  C) (Tympanic)   Resp 16   Ht 1.651 m (5' 5\")   Wt 131.1 kg (289 lb)   LMP 08/01/2016   SpO2 98%   BMI 48.09 kg/m    Body mass index is 48.09 kg/m .  Physical Exam   GENERAL: healthy, alert and no distress  NECK: no adenopathy, no asymmetry, masses, or scars and thyroid normal to palpation  RESP: lungs clear to auscultation - no rales, rhonchi or wheezes  CV: regular rate and rhythm, normal S1 S2, no S3 or S4, no murmur, click or rub, no peripheral edema and peripheral pulses strong  ABDOMEN: soft, nontender, no hepatosplenomegaly, no masses and bowel sounds normal  MS: no gross musculoskeletal defects noted, no edema                 "

## 2023-04-20 NOTE — LETTER
My Asthma Action Plan  Name: Summer Smith   YOB: 1980  Date: 4/20/2023   My doctor: Arturo Brooks   My clinic: [facility]      My Control Medicine:         Dose:   My Rescue Medicine:         Dose:    My Asthma Severity:   Avoid your asthma triggers:         GREEN ZONE   Good Control    I feel good    No cough or wheeze    Can work, sleep and play without asthma symptoms       Take your asthma control medicine every day.     1. If exercise triggers your asthma, take your rescue medication    15 minutes before exercise or sports, and    During exercise if you have asthma symptoms  2. Spacer to use with inhaler: If you have a spacer, make sure to use it with your inhaler             YELLOW ZONE Getting Worse  I have ANY of these:    I do not feel good    Cough or wheeze    Chest feels tight    Wake up at night   1. Keep taking your Green Zone medications  2. Start taking your rescue medicine:    every 20 minutes for up to 1 hour. Then every 4 hours for 24-48 hours.  3. If you stay in the Yellow Zone for more than 12-24 hours, contact your doctor.  4. If you do not return to the Green Zone in 12-24 hours or you get worse, start taking your oral steroid medicine if prescribed by your provider.           RED ZONE Medical Alert - Get Help  I have ANY of these:    I feel awful    Medicine is not helping    Breathing getting harder    Trouble walking or talking    Nose opens wide to breathe       1. Take your rescue medicine NOW  2. If your provider has prescribed an oral steroid medicine, start taking it NOW  3. Call your doctor NOW  4. If you are still in the Red Zone after 20 minutes and you have not reached your doctor:    Take your rescue medicine again and    Call 911 or go to the emergency room right away    See your regular doctor within 2 weeks of an Emergency Room or Urgent Care visit for follow-up treatment.            Annual Reminders:  Meet with Asthma Educator,   Flu Shot in the Fall, consider Pneumonia Vaccination for patients with asthma (aged 19 and older).    Pharmacy:    [id number] [place] [facility] - [place], [place] - [address] [place] [address] [facility] [place] [facility] [place], [place] - [address] [facility]                    Asthma Triggers  How To Control Things That Make Your Asthma Worse    Triggers are things that make your asthma worse.  Look at the list below to help you find your triggers and what you can do about them.  You can help prevent asthma flare-ups by staying away from your triggers.      Trigger                                                          What you can do   Cigarette Smoke  Tobacco smoke can make asthma worse. Do not allow smoking in your home, car or around you.  Be sure no one smokes at a child s day care or school.  If you smoke, ask your health care provider for ways to help you quit.  Ask family members to quit too.  Ask your health care provider for a referral to Quit Plan to help you quit smoking, or call 3-055-009-PLAN.     Colds, Flu, Bronchitis  These are common triggers of asthma. Wash your hands often.  Don t touch your eyes, nose or mouth.  Get a flu shot every year.     Dust Mites  These are tiny bugs that live in cloth or carpet. They are too small to see. Wash sheets and blankets in hot water every week.   Encase pillows and mattress in dust mite proof covers.  Avoid having carpet if you can. If you have carpet, vacuum weekly.   Use a dust mask and HEPA vacuum.   Pollen and Outdoor Mold  Some people are allergic to trees, grass, or weed pollen, or molds. Try to keep your windows closed.  Limit time out doors when pollen count is high.   Ask you health care provider about taking medicine during allergy season.     Animal Dander  Some people are allergic to skin flakes, urine or saliva from pets with fur or feathers. Keep pets with fur or feathers out of your home.    If you can t keep the pet outdoors, then keep the pet out of your bedroom.   Keep the bedroom door closed.  Keep pets off cloth furniture and away from stuffed toys.     Mice, Rats, and Cockroaches  Some people are allergic to the waste from these pests.   Cover food and garbage.  Clean up spills and food crumbs.  Store grease in the refrigerator.   Keep food out of the bedroom.   Indoor Mold  This can be a trigger if your home has high moisture. Fix leaking faucets, pipes, or other sources of water.   Clean moldy surfaces.  Dehumidify basement if it is damp and smelly.   Smoke, Strong Odors, and Sprays  These can reduce air quality. Stay away from strong odors and sprays, such as perfume, powder, hair spray, paints, smoke incense, paint, cleaning products, candles and new carpet.   Exercise or Sports  Some people with asthma have this trigger. Be active!  Ask your doctor about taking medicine before sports or exercise to prevent symptoms.    Warm up for 5-10 minutes before and after sports or exercise.     Other Triggers of Asthma  Cold air:  Cover your nose and mouth with a scarf.  Sometimes laughing or crying can be a trigger.  Some medicines and food can trigger asthma.

## 2023-06-08 ENCOUNTER — OFFICE VISIT (OUTPATIENT)
Dept: PODIATRY | Facility: CLINIC | Age: 43
End: 2023-06-08
Payer: COMMERCIAL

## 2023-06-08 VITALS
HEIGHT: 65 IN | DIASTOLIC BLOOD PRESSURE: 84 MMHG | HEART RATE: 69 BPM | BODY MASS INDEX: 48.15 KG/M2 | WEIGHT: 289 LBS | SYSTOLIC BLOOD PRESSURE: 119 MMHG

## 2023-06-08 DIAGNOSIS — M72.2 PLANTAR FASCIITIS, LEFT: ICD-10-CM

## 2023-06-08 DIAGNOSIS — M72.2 PLANTAR FASCIITIS, RIGHT: Primary | ICD-10-CM

## 2023-06-08 PROCEDURE — 99213 OFFICE O/P EST LOW 20 MIN: CPT | Performed by: PODIATRIST

## 2023-06-08 RX ORDER — METHYLPREDNISOLONE 4 MG
4 TABLET, DOSE PACK ORAL SEE ADMIN INSTRUCTIONS
Qty: 21 TABLET | Refills: 0 | Status: SHIPPED | OUTPATIENT
Start: 2023-06-08 | End: 2023-08-01

## 2023-06-08 ASSESSMENT — PAIN SCALES - GENERAL: PAINLEVEL: MODERATE PAIN (5)

## 2023-06-08 NOTE — NURSING NOTE
"Chief Complaint   Patient presents with     RECHECK     Plantarfasciitis- since her last visit pain has increased and now is also in the left heel       Initial /84   Pulse 69   Ht 1.651 m (5' 5\")   Wt 131.1 kg (289 lb)   LMP 08/01/2016   BMI 48.09 kg/m   Estimated body mass index is 48.09 kg/m  as calculated from the following:    Height as of this encounter: 1.651 m (5' 5\").    Weight as of this encounter: 131.1 kg (289 lb).  Medications and allergies reviewed.      Gabby DUONG MA    "

## 2023-06-08 NOTE — PROGRESS NOTES
"Summer returns to the office for reevaluation of the right and left foot.  The patient relates following the instructions given at the last visit with noted overall more pain and less improvement in function of the right and left foot.   The patient relates no other problems.    Vitals: /84   Pulse 69   Ht 1.651 m (5' 5\")   Wt 131.1 kg (289 lb)   LMP 08/01/2016   BMI 48.09 kg/m    BMI= Body mass index is 48.09 kg/m .    Lower Extremity Physical Exam:      Neurovascular status remains unchanged.  Muscular exam is within normal limits to major muscle groups.  Integument is intact.      Noted pain on palpation on the plantar aspect of the right and left heels near the insertion point of the plantar fascia.  No surrounding erythema or edema noted.         Assessment:     ICD-10-CM    1. Plantar fasciitis, right  M72.2       2. Plantar fasciitis, left  M72.2           Plan:    I have explained to Summer about the progress of the conditions.  At this time, the patient was educated on the importance of offloading supportive shoes and other devices.  I demonstrated to the patient calf stretches to perform every hour daily until symptoms resolve.  After symptoms resolve, the patient was advised to perform the stretches 3 times daily to prevent future recurrence.  The patient was instructed to perform warm soaks with Epson salt after which to also apply over-the-counter Voltaren gel to deeply massage the injured tissue.  The patient was instructed to do this on a daily basis until symptoms resolve.   The patient was prescribed custom orthotics that will aid in offloading the tension forces to the soft tissues and prevent further inflammation.  To reduce the amount of current inflammation, the patient was prescribed a Medrol Dosepak to be taken daily with food and instructed to stop taking if any stomach irritation or swelling in extremities are noted.  The patient may return in 3 months for reevaluation to " determine if any further treatment will be needed.      Summer verbalized agreement with and understanding of the rational for the diagnosis and treatment plan.  All questions were answered to best of my ability and the patient's satisfaction. The patient was advised to contact the clinic with any questions that may arise after the clinic visit.      Disclaimer: This note consists of symbols derived from keyboarding, dictation and/or voice recognition software. As a result, there may be errors in the script that have gone undetected. Please consider this when interpreting information found in this chart.       RAMILA Fernández D.P.M., [email or web address].S.

## 2023-06-08 NOTE — Clinical Note
"    6/8/2023         RE: Summer Smith  [id number]  [facility] [place] [address]        Dear Colleague,    Thank you for referring your patient, Summer Smith, to the [facility] [place]. Please see a copy of my visit note below.    Summer returns to the office for reevaluation of the {RIGHT /LEFT:821535} foot.  The patient relates following the instructions given at the last visit with noted overall {LESS/MORE:918701} pain and {LESS/MORE:652442} improvement in function of the {RIGHT /LEFT:498146} foot.   The patient relates no other problems.    Vitals: /84   Pulse 69   Ht 1.651 m (5' 5\")   Wt 131.1 kg (289 lb)   LMP 08/01/2016   BMI 48.09 kg/m    BMI= Body mass index is 48.09 kg/m .    Lower Extremity Physical Exam:      Neurovascular status remains unchanged.  Muscular exam is within normal limits to major muscle groups.  Integument is intact.      Noted ***    Diagnostics:  Radiograph evaluation including three views of the {RIGHT /LEFT:322035} foot reveals interval healing with increased trabeculation of the ***.  I personally evaluated the images as well as reviewed the images with the patient pointing out the findings.      Assessment:     ICD-10-CM    1. Plantar fasciitis, right  M72.2       2. Plantar fasciitis, left  M72.2           Plan:    I have explained to Summer about the progress of the conditions.  At this time, ***    Summer verbalized agreement with and understanding of the rational for the diagnosis and treatment plan.  All questions were answered to best of my ability and the patient's satisfaction. The patient was advised to contact the clinic with any questions that may arise after the clinic visit.      Disclaimer: This note consists of symbols derived from keyboarding, dictation and/or voice recognition software. As a result, there may be errors in the script that have gone undetected. Please consider this when interpreting information found in this " chart.       RAMILA Fernández D.P.M., CARLOS.F.A.S.        Again, thank you for allowing me to participate in the care of your patient.        Sincerely,        Louie Fernández DPM

## 2023-06-08 NOTE — PATIENT INSTRUCTIONS

## 2023-07-09 DIAGNOSIS — M79.604 BILATERAL LEG PAIN: ICD-10-CM

## 2023-07-09 DIAGNOSIS — G89.29 CHRONIC PAIN OF RIGHT KNEE: ICD-10-CM

## 2023-07-09 DIAGNOSIS — M25.561 CHRONIC PAIN OF RIGHT KNEE: ICD-10-CM

## 2023-07-09 DIAGNOSIS — M79.605 BILATERAL LEG PAIN: ICD-10-CM

## 2023-07-10 NOTE — TELEPHONE ENCOUNTER
Pending Prescriptions:                       Disp   Refills    celecoxib (CELEBREX) 100 MG capsule [Pharm*60 cap*3        Sig: TAKE ONE CAPSULE BY MOUTH TWICE A DAY AS NEEDED FOR           MODERATE PAIN.        Routing refill request to provider for review/approval because:  Labs out of date and last creatinine out of range.    Allergy warning:  Allergy/Contraindication:Sulfa AntibioticsReactions:Hives  Patient has upcoming appointment with PCP in a few weeks.   Last Rx was 10/28/23 for 60 +3.   Last office visit: 4/20/2023 with prescribing provider.        Massiel Chu RN

## 2023-07-11 RX ORDER — CELECOXIB 100 MG/1
CAPSULE ORAL
Qty: 60 CAPSULE | Refills: 3 | Status: SHIPPED | OUTPATIENT
Start: 2023-07-11 | End: 2024-01-15

## 2023-08-01 ENCOUNTER — VIRTUAL VISIT (OUTPATIENT)
Dept: FAMILY MEDICINE | Facility: CLINIC | Age: 43
End: 2023-08-01

## 2023-08-01 DIAGNOSIS — I26.99 RECURRENT PULMONARY EMBOLI (H): ICD-10-CM

## 2023-08-01 DIAGNOSIS — J45.20 MILD INTERMITTENT REACTIVE AIRWAY DISEASE WITHOUT COMPLICATION: ICD-10-CM

## 2023-08-01 DIAGNOSIS — E66.01 MORBID OBESITY DUE TO EXCESS CALORIES (H): ICD-10-CM

## 2023-08-01 PROBLEM — I48.91 ATRIAL FIBRILLATION WITH RAPID VENTRICULAR RESPONSE (H): Status: RESOLVED | Noted: 2023-04-13 | Resolved: 2023-08-01

## 2023-08-01 PROCEDURE — 99214 OFFICE O/P EST MOD 30 MIN: CPT | Mod: VID | Performed by: FAMILY MEDICINE

## 2023-08-01 RX ORDER — ALBUTEROL SULFATE 90 UG/1
2 AEROSOL, METERED RESPIRATORY (INHALATION) EVERY 4 HOURS PRN
Qty: 18 G | Refills: 1 | Status: SHIPPED | OUTPATIENT
Start: 2023-08-01

## 2023-08-01 NOTE — PATIENT INSTRUCTIONS
Start Wegovy 0.25mg once a week for 4 weeks  Then increase to 0.5mg once a week.    Let me know if side effects or try to do the injection into the thigh.

## 2023-08-01 NOTE — PROGRESS NOTES
Summer is a 42 year old who is being evaluated via a billable video visit.      How would you like to obtain your AVS? MyChart  If the video visit is dropped, the invitation should be resent by: Send to e-mail at: [email or web address]  Will anyone else be joining your video visit? No          Assessment & Plan     Morbid obesity due to excess calories (H)  Discussed  Tried and side effects and not helpful with phentermine/topiramate  Wanting to try wegovy.  -discussed risks, benefits, and side effects of this new medication. Patient understands and is in agreement.  - Semaglutide-Weight Management (WEGOVY) 0.25 MG/0.5ML pen; Inject 0.25 mg Subcutaneous once a week for 4 doses  - Semaglutide-Weight Management (WEGOVY) 0.5 MG/0.5ML pen; Inject 0.5 mg Subcutaneous once a week for 4 doses  - Semaglutide-Weight Management (WEGOVY) 1 MG/0.5ML pen; Inject 1 mg Subcutaneous once a week for 4 doses  - PRIMARY CARE FOLLOW-UP SCHEDULING; Future    Mild intermittent reactive airway disease without complication  Stable, refill  - albuterol (PROAIR HFA/PROVENTIL HFA/VENTOLIN HFA) 108 (90 Base) MCG/ACT inhaler; Inhale 2 puffs into the lungs every 4 hours as needed for wheezing    Recurrent pulmonary emboli (H) and 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  Stable, refill  - rivaroxaban ANTICOAGULANT (XARELTO ANTICOAGULANT) 20 MG TABS tablet; Take 1 tablet (20 mg) by mouth daily (with dinner)           See Patient Instructions    Arturo Brooks MD  [facility]    Santos Wheeler is a 42 year old, presenting for the following health issues:  Weight Problem (Weight management plan request, pt would like to know about starting ozempic. )        8/1/2023    12:51 PM   Additional Questions   Roomed by Hetal DUONG MA   Accompanied by Self         8/1/2023    12:51 PM   Patient Reported Additional Medications   Patient reports taking the following new medications None       History of Present  "Illness       Reason for visit:  Weight management    She eats 2-3 servings of fruits and vegetables daily.She consumes 0 sweetened beverage(s) daily.She exercises with enough effort to increase her heart rate 10 to 19 minutes per day.  She exercises with enough effort to increase her heart rate 3 or less days per week. She is missing 1 dose(s) of medications per week.  She is not taking prescribed medications regularly due to remembering to take.       Chief Complaint   Patient presents with    Weight Problem     Weight management plan request, pt would like to know about starting ozempic.    Tried Noom  WW in past  Tried phentermine/ topiramate in past without much help. Difficult concentrating, brain fog.  Is tracking on [employer] supriya weight and calories.    Dad with type 2 diabetes.  No family history of MEN.  Mom just diagnosed with lymphoma and has partial thyroidectomy for suspicious cells.    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    Review of Systems   Constitutional, HEENT, cardiovascular, pulmonary, gi and gu systems are negative, except as otherwise noted.      Objective    Vitals - Patient Reported  Weight (Patient Reported): 131.5 kg (290 lb)  Height (Patient Reported): 165.1 cm (5' 5\")  BMI (Based on Pt Reported Ht/Wt): 48.26  Pain Score: Moderate Pain (4)  Pain Loc: Foot        Physical Exam   GENERAL: Healthy, alert and no distress  EYES: Eyes grossly normal to inspection.  No discharge or erythema, or obvious scleral/conjunctival abnormalities.  RESP: No audible wheeze, cough, or visible cyanosis.  No visible retractions or increased work of breathing.    SKIN: Visible skin clear. No significant rash, abnormal pigmentation or lesions.  NEURO: Cranial nerves grossly intact.  Mentation and speech appropriate for age.  PSYCH: Mentation appears " normal, affect normal/bright, judgement and insight intact, normal speech and appearance well-groomed.            Video-Visit Details    Type of service:  Video Visit     Originating Location (pt. Location): Home    Distant Location (provider location):  On-site  Platform used for Video Visit: German

## 2023-08-07 NOTE — TREATMENT PLAN
1/16 @ 2710 paged [place]/ Dr Simms re: pts arrival to unit. PALOMA Siegel  1/16 Sent outside imaging disk down to radiology to be loaded into system. PALOMA Siegel     2nd Attempt made to reach patient for [facility] call. Left voicemail please call [phone] leave first name, last name, and .  I will return your call.

## 2023-08-29 ENCOUNTER — MYC MEDICAL ADVICE (OUTPATIENT)
Dept: FAMILY MEDICINE | Facility: CLINIC | Age: 43
End: 2023-08-29

## 2023-08-30 NOTE — TELEPHONE ENCOUNTER
Dr Brooks    Per My Chart patient is asking if she can switch to Ozempic instead of Wegovy.  Wegovy is out of stock everywhere she said.

## 2023-09-19 ENCOUNTER — TELEPHONE (OUTPATIENT)
Dept: FAMILY MEDICINE | Facility: CLINIC | Age: 43
End: 2023-09-19

## 2023-09-28 ENCOUNTER — PATIENT OUTREACH (OUTPATIENT)
Dept: CARE COORDINATION | Facility: CLINIC | Age: 43
End: 2023-09-28

## 2023-10-12 ENCOUNTER — PATIENT OUTREACH (OUTPATIENT)
Dept: CARE COORDINATION | Facility: CLINIC | Age: 43
End: 2023-10-12

## 2023-12-16 ENCOUNTER — HEALTH MAINTENANCE LETTER (OUTPATIENT)
Age: 43
End: 2023-12-16

## 2024-01-14 DIAGNOSIS — M79.604 BILATERAL LEG PAIN: ICD-10-CM

## 2024-01-14 DIAGNOSIS — M25.561 CHRONIC PAIN OF RIGHT KNEE: ICD-10-CM

## 2024-01-14 DIAGNOSIS — M79.605 BILATERAL LEG PAIN: ICD-10-CM

## 2024-01-14 DIAGNOSIS — G89.29 CHRONIC PAIN OF RIGHT KNEE: ICD-10-CM

## 2024-01-15 RX ORDER — CELECOXIB 100 MG/1
CAPSULE ORAL
Qty: 60 CAPSULE | Refills: 0 | Status: SHIPPED | OUTPATIENT
Start: 2024-01-15 | End: 2024-02-13

## 2024-02-13 ENCOUNTER — VIRTUAL VISIT (OUTPATIENT)
Dept: FAMILY MEDICINE | Facility: CLINIC | Age: 44
End: 2024-02-13
Payer: COMMERCIAL

## 2024-02-13 DIAGNOSIS — G89.29 CHRONIC PAIN OF RIGHT KNEE: ICD-10-CM

## 2024-02-13 DIAGNOSIS — M25.561 CHRONIC PAIN OF RIGHT KNEE: ICD-10-CM

## 2024-02-13 DIAGNOSIS — M79.604 BILATERAL LEG PAIN: ICD-10-CM

## 2024-02-13 DIAGNOSIS — M79.605 BILATERAL LEG PAIN: ICD-10-CM

## 2024-02-13 DIAGNOSIS — E66.01 MORBID OBESITY DUE TO EXCESS CALORIES (H): Primary | ICD-10-CM

## 2024-02-13 DIAGNOSIS — Z12.31 VISIT FOR SCREENING MAMMOGRAM: ICD-10-CM

## 2024-02-13 DIAGNOSIS — I26.99 RECURRENT PULMONARY EMBOLI (H): ICD-10-CM

## 2024-02-13 PROCEDURE — 99214 OFFICE O/P EST MOD 30 MIN: CPT | Mod: 95 | Performed by: FAMILY MEDICINE

## 2024-02-13 RX ORDER — CELECOXIB 100 MG/1
100 CAPSULE ORAL 2 TIMES DAILY PRN
Qty: 180 CAPSULE | Refills: 3 | Status: SHIPPED | OUTPATIENT
Start: 2024-02-13

## 2024-02-13 ASSESSMENT — ASTHMA QUESTIONNAIRES
QUESTION_4 LAST FOUR WEEKS HOW OFTEN HAVE YOU USED YOUR RESCUE INHALER OR NEBULIZER MEDICATION (SUCH AS ALBUTEROL): THREE OR MORE TIMES PER DAY
QUESTION_5 LAST FOUR WEEKS HOW WOULD YOU RATE YOUR ASTHMA CONTROL: WELL CONTROLLED
ACT_TOTALSCORE: 11
QUESTION_2 LAST FOUR WEEKS HOW OFTEN HAVE YOU HAD SHORTNESS OF BREATH: ONCE A DAY
QUESTION_1 LAST FOUR WEEKS HOW MUCH OF THE TIME DID YOUR ASTHMA KEEP YOU FROM GETTING AS MUCH DONE AT WORK, SCHOOL OR AT HOME: SOME OF THE TIME
QUESTION_3 LAST FOUR WEEKS HOW OFTEN DID YOUR ASTHMA SYMPTOMS (WHEEZING, COUGHING, SHORTNESS OF BREATH, CHEST TIGHTNESS OR PAIN) WAKE YOU UP AT NIGHT OR EARLIER THAN USUAL IN THE MORNING: FOUR OR MORE NIGHTS A WEEK
ACT_TOTALSCORE: 11

## 2024-02-13 NOTE — PROGRESS NOTES
"Summer is a 43 year old who is being evaluated via a billable video visit.      How would you like to obtain your AVS? MyChart  If the video visit is dropped, the invitation should be resent by: Text to cell phone: [phone]  Will anyone else be joining your video visit? No          Assessment & Plan     Morbid obesity due to excess calories (H)  BMI 46, trending slowly down with diet, did get connected to nutrition through insurance.  Unable to get wegovy anywhere  Plan to switch to Zepbound, if unable to fill this then plan phentermine/topiramate.  -discussed risks, benefits, and side effects of this new medication. Patient understands and is in agreement.  - tirzepatide-Weight Management (ZEPBOUND) 2.5 MG/0.5ML prefilled pen; Inject 0.5 mLs (2.5 mg) Subcutaneous every 7 days for 28 days  - tirzepatide-Weight Management (ZEPBOUND) 5 MG/0.5ML prefilled pen; Inject 0.5 mLs (5 mg) Subcutaneous every 7 days for 28 days  - tirzepatide-Weight Management (ZEPBOUND) 7.5 MG/0.5ML prefilled pen; Inject 0.5 mLs (7.5 mg) Subcutaneous every 7 days for 28 days  - tirzepatide-Weight Management (ZEPBOUND) 10 MG/0.5ML prefilled pen; Inject 0.5 mLs (10 mg) Subcutaneous every 7 days    Bilateral leg pain  Stable, refill  This medication was oked by cardiology  - celecoxib (CELEBREX) 100 MG capsule; Take 1 capsule (100 mg) by mouth 2 times daily as needed for moderate pain    Chronic pain of right knee  Stable, refill  - celecoxib (CELEBREX) 100 MG capsule; Take 1 capsule (100 mg) by mouth 2 times daily as needed for moderate pain    Visit for screening mammogram  - MA SCREENING DIGITAL BILAT - Future  (s+30); Future    Recurrent pulmonary emboli (H)  On long term xarelto.        BMI  Estimated body mass index is 48.09 kg/m  as calculated from the following:    Height as of 6/8/23: 1.651 m (5' 5\").    Weight as of 6/8/23: 131.1 kg (289 lb).   Weight management plan: Discussed healthy diet and exercise guidelines      See Patient " "Instructions    Santos Wheeler is a 43 year old, presenting for the following health issues:  Weight Problem (Change in weight loss medication. Wegovy never able to be filled.) and Recheck Medication      2/13/2024     1:16 PM   Additional Questions   Roomed by Radha Quiros   Accompanied by self         2/13/2024     1:16 PM   Patient Reported Additional Medications   Patient reports taking the following new medications none     History of Present Illness       Reason for visit:  Med chech- Weight loss med change and Xarelto    She eats 2-3 servings of fruits and vegetables daily.She consumes 0 sweetened beverage(s) daily.She exercises with enough effort to increase her heart rate 20 to 29 minutes per day.  She exercises with enough effort to increase her heart rate 3 or less days per week.   She is taking medications regularly.       Medication Followup of Xarelto  Taking Medication as prescribed: yes  Side Effects:  None  Medication Helping Symptoms:  yes    Medication Followup of Wegovy, unable to pick it up anywhere.  Taking Medication as prescribed: NO-was never able to get it. Wanting to get something else.  Side Effects:  None  Medication Helping Symptoms:  NO  Insurance contacted her and had to get connected with dietician and the supriya/.  Tried phentermine/topiramate years ago.            Objective    Vitals - Patient Reported  Weight (Patient Reported): 127.9 kg (282 lb)  Height (Patient Reported): 165.1 cm (5' 5\")  BMI (Based on Pt Reported Ht/Wt): 46.93  Pain Score: No Pain (0)        Physical Exam   GENERAL: alert and no distress  EYES: Eyes grossly normal to inspection.  No discharge or erythema, or obvious scleral/conjunctival abnormalities.  RESP: No audible wheeze, cough, or visible cyanosis.    SKIN: Visible skin clear. No significant rash, abnormal pigmentation or lesions.  NEURO: Cranial nerves grossly intact.  Mentation and speech appropriate for age.  PSYCH: Appropriate affect, " tone, and pace of words          Video-Visit Details    Type of service:  Video Visit     Originating Location (pt. Location): Home    Distant Location (provider location):  On-site  Platform used for Video Visit: German  Signed Electronically by: Arturo Brooks MD

## 2024-02-14 ENCOUNTER — TELEPHONE (OUTPATIENT)
Dept: FAMILY MEDICINE | Facility: CLINIC | Age: 44
End: 2024-02-14
Payer: COMMERCIAL

## 2024-02-14 NOTE — TELEPHONE ENCOUNTER
Retail Pharmacy Prior Authorization Team   Phone: [phone]        EPA DENIED: LETTER ATTACHED

## 2024-02-14 NOTE — TELEPHONE ENCOUNTER
PRIOR AUTHORIZATION DENIED    Medication: TIRZEPATIDE-WEIGHT MANAGEMENT 2.5 MG/0.5ML SC SOAJ  Insurance Company: [employer] - Phone [phone] Fax [phone]  Denial Date: 2/13/2024  Denial Reason(s):     Appeal Information:    Patient Notified: No

## 2024-02-15 ENCOUNTER — MYC MEDICAL ADVICE (OUTPATIENT)
Dept: FAMILY MEDICINE | Facility: CLINIC | Age: 44
End: 2024-02-15
Payer: COMMERCIAL

## 2024-02-15 DIAGNOSIS — E66.01 MORBID OBESITY DUE TO EXCESS CALORIES (H): Primary | ICD-10-CM

## 2024-02-15 RX ORDER — PHENTERMINE HYDROCHLORIDE 15 MG/1
15 CAPSULE ORAL EVERY MORNING
Qty: 90 CAPSULE | Refills: 0 | Status: SHIPPED | OUTPATIENT
Start: 2024-02-15 | End: 2024-05-16

## 2024-02-15 RX ORDER — TOPIRAMATE 50 MG/1
TABLET, FILM COATED ORAL
Qty: 90 TABLET | Refills: 0 | Status: SHIPPED | OUTPATIENT
Start: 2024-02-15 | End: 2024-03-17

## 2024-02-15 NOTE — TELEPHONE ENCOUNTER
Dr. Brooks  PA for tirzepatide-wt management has been denied.    Patient needs to have tried and failed    Orlistat  Kacey Torres

## 2024-02-15 NOTE — TELEPHONE ENCOUNTER
Dr Brooks  Pt would like to go forward with phentermine topiramate combo for weight loss      Syed Alfaro RN

## 2024-02-24 ENCOUNTER — HEALTH MAINTENANCE LETTER (OUTPATIENT)
Age: 44
End: 2024-02-24

## 2024-03-17 ENCOUNTER — APPOINTMENT (OUTPATIENT)
Dept: CT IMAGING | Facility: CLINIC | Age: 44
End: 2024-03-17
Attending: FAMILY MEDICINE
Payer: COMMERCIAL

## 2024-03-17 ENCOUNTER — APPOINTMENT (OUTPATIENT)
Dept: MRI IMAGING | Facility: HOSPITAL | Age: 44
End: 2024-03-17
Attending: EMERGENCY MEDICINE
Payer: COMMERCIAL

## 2024-03-17 ENCOUNTER — HOSPITAL ENCOUNTER (EMERGENCY)
Facility: HOSPITAL | Age: 44
Discharge: HOME | End: 2024-03-17
Attending: EMERGENCY MEDICINE | Admitting: EMERGENCY MEDICINE
Payer: COMMERCIAL

## 2024-03-17 ENCOUNTER — HOSPITAL ENCOUNTER (EMERGENCY)
Facility: CLINIC | Age: 44
Discharge: ANOTHER HOSPITAL | End: 2024-03-17
Attending: FAMILY MEDICINE | Admitting: FAMILY MEDICINE
Payer: COMMERCIAL

## 2024-03-17 VITALS
BODY MASS INDEX: 46.48 KG/M2 | RESPIRATION RATE: 18 BRPM | TEMPERATURE: 98.4 F | HEIGHT: 65 IN | OXYGEN SATURATION: 97 % | HEART RATE: 71 BPM | SYSTOLIC BLOOD PRESSURE: 141 MMHG | DIASTOLIC BLOOD PRESSURE: 89 MMHG | WEIGHT: 279 LBS

## 2024-03-17 VITALS
HEART RATE: 79 BPM | SYSTOLIC BLOOD PRESSURE: 131 MMHG | RESPIRATION RATE: 16 BRPM | DIASTOLIC BLOOD PRESSURE: 65 MMHG | HEIGHT: 65 IN | TEMPERATURE: 97.5 F | WEIGHT: 282 LBS | BODY MASS INDEX: 46.98 KG/M2 | OXYGEN SATURATION: 96 %

## 2024-03-17 DIAGNOSIS — M54.50 ACUTE MIDLINE LOW BACK PAIN, UNSPECIFIED WHETHER SCIATICA PRESENT: ICD-10-CM

## 2024-03-17 DIAGNOSIS — R20.2 PARESTHESIA: ICD-10-CM

## 2024-03-17 DIAGNOSIS — N39.0 URINARY TRACT INFECTION WITHOUT HEMATURIA, SITE UNSPECIFIED: ICD-10-CM

## 2024-03-17 DIAGNOSIS — N12 PYELONEPHRITIS: ICD-10-CM

## 2024-03-17 LAB
ALBUMIN UR-MCNC: NEGATIVE MG/DL
ANION GAP SERPL CALCULATED.3IONS-SCNC: 8 MMOL/L (ref 7–15)
APPEARANCE UR: CLEAR
BACTERIA #/AREA URNS HPF: ABNORMAL /HPF
BASOPHILS # BLD AUTO: 0.1 10E3/UL (ref 0–0.2)
BASOPHILS NFR BLD AUTO: 1 %
BILIRUB UR QL STRIP: NEGATIVE
BUN SERPL-MCNC: 12.5 MG/DL (ref 6–20)
CALCIUM SERPL-MCNC: 9.6 MG/DL (ref 8.6–10)
CHLORIDE SERPL-SCNC: 106 MMOL/L (ref 98–107)
COLOR UR AUTO: ABNORMAL
CREAT SERPL-MCNC: 0.83 MG/DL (ref 0.51–0.95)
DEPRECATED HCO3 PLAS-SCNC: 26 MMOL/L (ref 22–29)
EGFRCR SERPLBLD CKD-EPI 2021: 90 ML/MIN/1.73M2
EOSINOPHIL # BLD AUTO: 0.1 10E3/UL (ref 0–0.7)
EOSINOPHIL NFR BLD AUTO: 2 %
ERYTHROCYTE [DISTWIDTH] IN BLOOD BY AUTOMATED COUNT: 12.5 % (ref 10–15)
GLUCOSE SERPL-MCNC: 98 MG/DL (ref 70–99)
GLUCOSE UR STRIP-MCNC: NEGATIVE MG/DL
HCT VFR BLD AUTO: 43.7 % (ref 35–47)
HGB BLD-MCNC: 14.4 G/DL (ref 11.7–15.7)
HGB UR QL STRIP: NEGATIVE
IMM GRANULOCYTES # BLD: 0 10E3/UL
IMM GRANULOCYTES NFR BLD: 0 %
KETONES UR STRIP-MCNC: NEGATIVE MG/DL
LEUKOCYTE ESTERASE UR QL STRIP: NEGATIVE
LYMPHOCYTES # BLD AUTO: 2.7 10E3/UL (ref 0.8–5.3)
LYMPHOCYTES NFR BLD AUTO: 34 %
MCH RBC QN AUTO: 27.9 PG (ref 26.5–33)
MCHC RBC AUTO-ENTMCNC: 33 G/DL (ref 31.5–36.5)
MCV RBC AUTO: 85 FL (ref 78–100)
MONOCYTES # BLD AUTO: 0.6 10E3/UL (ref 0–1.3)
MONOCYTES NFR BLD AUTO: 8 %
MUCOUS THREADS #/AREA URNS LPF: PRESENT /LPF
NEUTROPHILS # BLD AUTO: 4.3 10E3/UL (ref 1.6–8.3)
NEUTROPHILS NFR BLD AUTO: 55 %
NITRATE UR QL: NEGATIVE
NRBC # BLD AUTO: 0 10E3/UL
NRBC BLD AUTO-RTO: 0 /100
PH UR STRIP: 7 [PH] (ref 5–7)
PLATELET # BLD AUTO: 332 10E3/UL (ref 150–450)
POTASSIUM SERPL-SCNC: 4 MMOL/L (ref 3.4–5.3)
RBC # BLD AUTO: 5.16 10E6/UL (ref 3.8–5.2)
RBC URINE: 7 /HPF
SODIUM SERPL-SCNC: 140 MMOL/L (ref 135–145)
SP GR UR STRIP: <1.005 (ref 1–1.03)
SQUAMOUS EPITHELIAL: 5 /HPF
UROBILINOGEN UR STRIP-MCNC: <2 MG/DL
WBC # BLD AUTO: 7.8 10E3/UL (ref 4–11)
WBC URINE: 1 /HPF

## 2024-03-17 PROCEDURE — 96365 THER/PROPH/DIAG IV INF INIT: CPT

## 2024-03-17 PROCEDURE — 250N000011 HC RX IP 250 OP 636: Performed by: FAMILY MEDICINE

## 2024-03-17 PROCEDURE — 74177 CT ABD & PELVIS W/CONTRAST: CPT

## 2024-03-17 PROCEDURE — 250N000011 HC RX IP 250 OP 636: Performed by: EMERGENCY MEDICINE

## 2024-03-17 PROCEDURE — 36415 COLL VENOUS BLD VENIPUNCTURE: CPT | Performed by: FAMILY MEDICINE

## 2024-03-17 PROCEDURE — 80048 BASIC METABOLIC PNL TOTAL CA: CPT | Performed by: FAMILY MEDICINE

## 2024-03-17 PROCEDURE — 85025 COMPLETE CBC W/AUTO DIFF WBC: CPT | Performed by: FAMILY MEDICINE

## 2024-03-17 PROCEDURE — 99285 EMERGENCY DEPT VISIT HI MDM: CPT | Mod: 25

## 2024-03-17 PROCEDURE — 81001 URINALYSIS AUTO W/SCOPE: CPT | Performed by: FAMILY MEDICINE

## 2024-03-17 PROCEDURE — 250N000013 HC RX MED GY IP 250 OP 250 PS 637: Performed by: EMERGENCY MEDICINE

## 2024-03-17 PROCEDURE — 96375 TX/PRO/DX INJ NEW DRUG ADDON: CPT

## 2024-03-17 PROCEDURE — 72148 MRI LUMBAR SPINE W/O DYE: CPT

## 2024-03-17 RX ORDER — CEPHALEXIN 500 MG/1
500 CAPSULE ORAL 3 TIMES DAILY
Qty: 21 CAPSULE | Refills: 0 | Status: SHIPPED | OUTPATIENT
Start: 2024-03-17 | End: 2024-03-20

## 2024-03-17 RX ORDER — FLUTICASONE PROPIONATE 50 MCG
1-2 SPRAY, SUSPENSION (ML) NASAL DAILY PRN
COMMUNITY

## 2024-03-17 RX ORDER — LORAZEPAM 2 MG/ML
1 INJECTION INTRAMUSCULAR ONCE
Status: COMPLETED | OUTPATIENT
Start: 2024-03-17 | End: 2024-03-17

## 2024-03-17 RX ORDER — IBUPROFEN 600 MG/1
600 TABLET, FILM COATED ORAL ONCE
Status: COMPLETED | OUTPATIENT
Start: 2024-03-17 | End: 2024-03-17

## 2024-03-17 RX ORDER — CETIRIZINE HYDROCHLORIDE 10 MG/1
10 TABLET ORAL DAILY PRN
COMMUNITY

## 2024-03-17 RX ORDER — TOPIRAMATE 50 MG/1
50 TABLET, FILM COATED ORAL DAILY
COMMUNITY
End: 2024-05-14

## 2024-03-17 RX ORDER — CEFTRIAXONE 1 G/1
1 INJECTION, POWDER, FOR SOLUTION INTRAMUSCULAR; INTRAVENOUS ONCE
Status: COMPLETED | OUTPATIENT
Start: 2024-03-17 | End: 2024-03-17

## 2024-03-17 RX ORDER — IOPAMIDOL 755 MG/ML
90 INJECTION, SOLUTION INTRAVASCULAR ONCE
Status: COMPLETED | OUTPATIENT
Start: 2024-03-17 | End: 2024-03-17

## 2024-03-17 RX ADMIN — IBUPROFEN 600 MG: 600 TABLET, FILM COATED ORAL at 21:02

## 2024-03-17 RX ADMIN — CEFTRIAXONE SODIUM 1 G: 1 INJECTION, POWDER, FOR SOLUTION INTRAMUSCULAR; INTRAVENOUS at 21:56

## 2024-03-17 RX ADMIN — LORAZEPAM 1 MG: 2 INJECTION INTRAMUSCULAR; INTRAVENOUS at 21:04

## 2024-03-17 RX ADMIN — IOPAMIDOL 90 ML: 755 INJECTION, SOLUTION INTRAVENOUS at 18:14

## 2024-03-17 ASSESSMENT — ENCOUNTER SYMPTOMS: NUMBNESS: 1

## 2024-03-17 ASSESSMENT — COLUMBIA-SUICIDE SEVERITY RATING SCALE - C-SSRS

## 2024-03-17 ASSESSMENT — ACTIVITIES OF DAILY LIVING (ADL)
ADLS_ACUITY_SCORE: 35
ADLS_ACUITY_SCORE: 37
ADLS_ACUITY_SCORE: 37
ADLS_ACUITY_SCORE: 33

## 2024-03-17 NOTE — ED TRIAGE NOTES
Pt reports bilateral inside leg numbness since 9am. Pt with lower back pain for 3 weeks. No hx of similar symptoms prior. Ambulatory.  Trouble with BM for past few days that feels like constipation but BM is soft and not hard. No known injury.   Triage Assessment (Adult)       Row Name 03/17/24 1427          Triage Assessment    Airway WDL WDL        Respiratory WDL    Respiratory WDL WDL        Skin Circulation/Temperature WDL    Skin Circulation/Temperature WDL WDL        Cardiac WDL    Cardiac WDL WDL        Peripheral/Neurovascular WDL    Peripheral Neurovascular WDL X  inside leg numbness        Cognitive/Neuro/Behavioral WDL    Cognitive/Neuro/Behavioral WDL WDL

## 2024-03-17 NOTE — ED NOTES
Met with patient briefly prior to triage. Pt reports bilateral inside leg numbness since 9am. Pt with back pain. No hx of similar symptoms prior. Ambulatory.

## 2024-03-17 NOTE — ED PROVIDER NOTES
EMERGENCY DEPARTMENT ENCOUNTER      NAME: Summer Smith  AGE: 43 year old female  YOB: 1980  MRN: [id number]  EVALUATION DATE & TIME: No admission date for patient encounter.    PCP: Arturo Brooks    ED PROVIDER: Cleveland Fernandez M.D.    Chief Complaint   Patient presents with    Numbness    Back Pain       FINAL IMPRESSION:  1. Paresthesia    2. Acute midline low back pain, unspecified whether sciatica present        ED COURSE & MEDICAL DECISION MAKING:    Pertinent Labs & Imaging studies independently interpreted by me. (See chart for details)  2:41 PM  Patient seen and examined, reviewed most recent virtual visit with primary care on February 13 which was follow-up for morbid obesity, currently on tirzepatide, also chronic bilateral leg pain and patient was continued on Celebrex.  Patient presents today with numbness in her legs.  This is in the anterior medial thigh bilaterally going down to the lower leg to the foot.  No weakness.  No bowel or bladder incontinence but has felt some pelvic fullness during this time as well.  On exam here, decreased sensation of the anterior medial lower leg and thigh, strength with ankle plantarflexion and dorsiflexion, great toe dorsiflexion, and straight leg raise intact bilaterally.  Patient notes that tingling and numbness in legs is worse with sitting.  MRI is ordered, also CT scan abdomen pelvis due to sensation of pelvic fullness and concern for pelvic mass.  6:44 PM labs ordered and independently interpreted by me with normal CBC, normal basic metabolic panel.  CT of the abdomen and pelvis independently interpreted by me demonstrates some air in the urinary bladder but no other acute findings.  Patient at MRI now.  6:57 PM  Notified by MRI that patient;s shoulders will not fit in our MRI.  Will arrange transfer to [facility] for MRI.  7:12 PM  Care discussed with Dr. Singh, [facility] ED.    At the conclusion of the encounter I discussed the  results of all of the tests and the disposition. The questions were answered. The patient or family acknowledged understanding and was agreeable with the care plan.     Medical Decision Making  Obtained supplemental history:Supplemental history obtained?: No  Reviewed external records: External records reviewed?: Documented in chart  Care impacted by chronic illness:Other: morbid obesity  Care significantly affected by social determinants of health:N/A  Did you consider but not order tests?: Work up considered but not performed and documented in chart, if applicable  Did you interpret images independently?: Independent interpretation of ECG and images noted in documentation, when applicable.  Consultation discussion with other provider:Did you involve another provider (consultant, , pharmacy, etc.)?: I discussed the care with another health care provider, see documentation for details.  To [facility] for further evaluation and treatment.    MEDICATIONS GIVEN IN THE EMERGENCY:  Medications   iopamidol (ISOVUE-370) solution 90 mL (90 mLs Intravenous $Given 3/17/24 6988)       NEW PRESCRIPTIONS STARTED AT TODAY'S ER VISIT  New Prescriptions    No medications on file       =================================================================    HPI    Patient information was obtained from: patient      Summer Smith is a 43 year old female with a pertinent history of DVT, PE, leg pain who presents to this ED for evaluation of back pain and leg tingling.  Patient notes about 3 weeks of back pain, no known injury but this started when she was had some coughing and was wondering if she pulled something coughing.  Today insurance sat down and noticed pins-and-needles and numbness of the anterior medial thighs and lower legs bilaterally.  This been ongoing, worse with sitting.  Denies any weakness.  No urinary symptoms including frequency or incontinence, no bowel incontinence but has sensation of feeling constipated  although she is passing soft stools normally.  Feels some pelvic fullness and pressure, prior hysterectomy.  No weight loss or weight gain, no night sweats.      REVIEW OF SYSTEMS   Review of Systems   All other systems reviewed and negative    PAST MEDICAL HISTORY:  Past Medical History:   Diagnosis Date    Asthma     Cardiac murmur     DVT (deep vein thrombosis) in pregnancy 2007    DVT (deep venous thrombosis) (H) 2007    May-Thurner syndrome     Menorrhagia     Pulmonary embolism (H) 2007    no long term anticoagulation, no recurrence    Pulmonary embolism (H) 2007       PAST SURGICAL HISTORY:  Past Surgical History:   Procedure Laterality Date    ABLATION SAPHENOUS VEIN W/ RFA      AS LIGATN SHORT SAPHEN      had great saphenous vein removal    DAVINCI HYSTERECTOMY TOTAL      tubes removed, ovaries in place    HYSTERECTOMY  11/03/2016    IR EXTREMITY VENOGRAM LEFT  12/1/2016    OTHER SURGICAL HISTORY  2009    stenting into pelvis    OTHER SURGICAL HISTORY Left     saphenous vein removed    OTHER SURGICAL HISTORY      wisdom teeth extractions    STENT      venous in leg       CURRENT MEDICATIONS:    No current facility-administered medications for this encounter.     Current Outpatient Medications   Medication    albuterol (PROAIR HFA/PROVENTIL HFA/VENTOLIN HFA) 108 (90 Base) MCG/ACT inhaler    celecoxib (CELEBREX) 100 MG capsule    cetirizine (ZYRTEC) 10 MG tablet    Levocarnitine 500 MG TABS    rivaroxaban ANTICOAGULANT (XARELTO ANTICOAGULANT) 20 MG TABS tablet    fluticasone (FLONASE) 50 MCG/ACT nasal spray    Multiple Vitamin (MULTIVITAMIN ADULT PO)    phentermine (ADIPEX-P) 15 MG capsule    [START ON 5/7/2024] tirzepatide-Weight Management (ZEPBOUND) 10 MG/0.5ML prefilled pen    tirzepatide-Weight Management (ZEPBOUND) 5 MG/0.5ML prefilled pen    [START ON 4/9/2024] tirzepatide-Weight Management (ZEPBOUND) 7.5 MG/0.5ML prefilled pen    topiramate (TOPAMAX) 50 MG tablet    vitamin D3 (CHOLECALCIFEROL) 2000  "units (50 mcg) tablet       ALLERGIES:  Allergies   Allergen Reactions    Phytonadione Anaphylaxis     IV Vitamin K    Sulfa Antibiotics Hives    Vitamin K Anaphylaxis     IV Vitamin K only    Penicillin G Rash    Amoxicillin-Pot Clavulanate Rash       FAMILY HISTORY:  Family History   Problem Relation Age of Onset    Thyroid Disease Mother         partial lobectomy    Diabetes Father     Hypertension Father     Hyperlipidemia Father     Colon Cancer Maternal Grandfather     Liver Cancer Maternal Grandfather     Cancer Paternal Grandfather     Thyroid Disease Sister     Thyroid Disease Sister     No Known Problems Sister     No Known Problems Sister     Endocrine Disease Daughter         Luiclle, has appointment end of July 2016    Autism Spectrum Disorder Daughter     Spina bifida Daughter        SOCIAL HISTORY:   Social History     Socioeconomic History    Marital status:    Tobacco Use    Smoking status: Never    Smokeless tobacco: Never   Vaping Use    Vaping Use: Never used   Substance and Sexual Activity    Alcohol use: Yes     Comment: rare    Drug use: No    Sexual activity: Yes     Partners: Male     Birth control/protection: Male Surgical   Other Topics Concern    Parent/sibling w/ CABG, MI or angioplasty before 65F 55M? No       VITALS:  BP (!) 175/100   Pulse 88   Temp 98.4  F (36.9  C) (Temporal)   Resp 18   Ht 1.651 m (5' 5\")   Wt 126.6 kg (279 lb)   LMP 08/01/2016   SpO2 98%   BMI 46.43 kg/m      PHYSICAL EXAM:  Physical Exam  Vitals and nursing note reviewed.   Constitutional:       Appearance: Normal appearance.   HENT:      Head: Normocephalic and atraumatic.      Right Ear: External ear normal.      Left Ear: External ear normal.      Nose: Nose normal.      Mouth/Throat:      Mouth: Mucous membranes are moist.   Eyes:      Extraocular Movements: Extraocular movements intact.      Conjunctiva/sclera: Conjunctivae normal.      Pupils: Pupils are equal, round, and reactive to light. "   Cardiovascular:      Rate and Rhythm: Normal rate and regular rhythm.   Pulmonary:      Effort: Pulmonary effort is normal.      Breath sounds: Normal breath sounds. No wheezing or rales.   Abdominal:      General: Abdomen is flat. There is no distension.      Palpations: Abdomen is soft.      Tenderness: There is no abdominal tenderness. There is no guarding.   Musculoskeletal:         General: Normal range of motion.      Cervical back: Normal range of motion and neck supple.      Right lower leg: No edema.      Left lower leg: No edema.   Lymphadenopathy:      Cervical: No cervical adenopathy.   Skin:     General: Skin is warm and dry.   Neurological:      Mental Status: She is alert and oriented to person, place, and time. Mental status is at baseline.      Comments: Decreased sensation of the anterior medial thighs and lower legs, strength of the lower extremities intact   Psychiatric:         Mood and Affect: Mood normal.         Behavior: Behavior normal.         Thought Content: Thought content normal.          LAB:  All pertinent labs reviewed and interpreted.  Results for orders placed or performed during the hospital encounter of 03/17/24   CT Abdomen Pelvis w Contrast    Impression    IMPRESSION:   1.  Findings suggestive of cystitis. Additionally, there is a small amount of air within the bladder which could be related to underlying infection or recent instrumentation/Sullivan catheter.  2.  Hepatic steatosis.  3.  Diverticulosis.   Basic metabolic panel   Result Value Ref Range    Sodium 140 135 - 145 mmol/L    Potassium 4.0 3.4 - 5.3 mmol/L    Chloride 106 98 - 107 mmol/L    Carbon Dioxide (CO2) 26 22 - 29 mmol/L    Anion Gap 8 7 - 15 mmol/L    Urea Nitrogen 12.5 6.0 - 20.0 mg/dL    Creatinine 0.83 0.51 - 0.95 mg/dL    GFR Estimate 90 >60 mL/min/1.73m2    Calcium 9.6 8.6 - 10.0 mg/dL    Glucose 98 70 - 99 mg/dL   CBC with platelets and differential   Result Value Ref Range    WBC Count 7.8 4.0 - 11.0  10e3/uL    RBC Count 5.16 3.80 - 5.20 10e6/uL    Hemoglobin 14.4 11.7 - 15.7 g/dL    Hematocrit 43.7 35.0 - 47.0 %    MCV 85 78 - 100 fL    MCH 27.9 26.5 - 33.0 pg    MCHC 33.0 31.5 - 36.5 g/dL    RDW 12.5 10.0 - 15.0 %    Platelet Count 332 150 - 450 10e3/uL    % Neutrophils 55 %    % Lymphocytes 34 %    % Monocytes 8 %    % Eosinophils 2 %    % Basophils 1 %    % Immature Granulocytes 0 %    NRBCs per 100 WBC 0 <1 /100    Absolute Neutrophils 4.3 1.6 - 8.3 10e3/uL    Absolute Lymphocytes 2.7 0.8 - 5.3 10e3/uL    Absolute Monocytes 0.6 0.0 - 1.3 10e3/uL    Absolute Eosinophils 0.1 0.0 - 0.7 10e3/uL    Absolute Basophils 0.1 0.0 - 0.2 10e3/uL    Absolute Immature Granulocytes 0.0 <=0.4 10e3/uL    Absolute NRBCs 0.0 10e3/uL       RADIOLOGY:  Reviewed all pertinent imaging. Please see official radiology report.  CT Abdomen Pelvis w Contrast   Final Result   IMPRESSION:    1.  Findings suggestive of cystitis. Additionally, there is a small amount of air within the bladder which could be related to underlying infection or recent instrumentation/Sullivan catheter.   2.  Hepatic steatosis.   3.  Diverticulosis.      MR Lumbar Spine w/o & w Contrast    (Results Pending)       Cleveland Fernandez M.D.  Emergency Medicine  [facility] EMERGENCY ROOM  [address] [address] [phone]  Dept: [phone]       Cleveland Fernandez MD  03/18/24 2001

## 2024-03-18 NOTE — PHARMACY-ADMISSION MEDICATION HISTORY
Pharmacist Admission Medication History    Admission medication history is complete. The information provided in this note is only as accurate as the sources available at the time of the update.    Information Source(s): Patient and CareEverywhere/SureScripts via in-person    Pertinent Information: Prescribed Zepbound (tirzepatide) titration 5 mg, 7.5 mg, 10 mg ; has been unable to obtain. Will basia as patient not taking but prefer to leave on list.     Changes made to PTA medication list:  Added: None  Deleted: None  Changed: cetirizine 10 mg daily prn, Flonase prn, levocarnitine 1000 mg daily, multivitamin 1 tab daily, rivaroxaban takes in the morning (not with dinner), topiramate dose titration at 50 mg daily, vitamin D 2000 units during summer months, 4000 units during winter months    Allergies reviewed with patient and updates made in EHR: yes, added phytonadione     Medication History Completed By: Digna Christy [facility] 3/17/2024 7:36 PM    PTA Med List   Medication Sig Note Last Dose    albuterol (PROAIR HFA/PROVENTIL HFA/VENTOLIN HFA) 108 (90 Base) MCG/ACT inhaler Inhale 2 puffs into the lungs every 4 hours as needed for wheezing  Unknown    celecoxib (CELEBREX) 100 MG capsule Take 1 capsule (100 mg) by mouth 2 times daily as needed for moderate pain  Past Week    cetirizine (ZYRTEC) 10 MG tablet Take 10 mg by mouth daily as needed for allergies (seasonal)  Unknown    fluticasone (FLONASE) 50 MCG/ACT nasal spray Spray 1-2 sprays into both nostrils daily as needed for rhinitis or allergies (seasonal)  Unknown    Levocarnitine 500 MG TABS Take 1,000 mg by mouth daily  3/17/2024 at am    Multiple Vitamin (MULTIVITAMIN ADULT PO) Take 1 tablet by mouth daily  3/17/2024 at am    phentermine (ADIPEX-P) 15 MG capsule Take 1 capsule (15 mg) by mouth every morning  3/17/2024 at am    rivaroxaban ANTICOAGULANT (XARELTO ANTICOAGULANT) 20 MG TABS tablet Take 1 tablet (20 mg) by mouth daily (with dinner) 3/17/2024: Takes  in the morning.  3/17/2024 at am - takes in the morning    topiramate (TOPAMAX) 50 MG tablet Take 50 mg by mouth daily  3/17/2024 at am    vitamin D3 (CHOLECALCIFEROL) 2000 units (50 mcg) tablet Take  mcg by mouth daily More sun/Summer months: take 1 tab (50 mcg) daily  Less sun/Winter months: take 2 tabs (100 mcg) daily  3/17/2024 at 100 mcg AM

## 2024-03-18 NOTE — DISCHARGE INSTRUCTIONS
Your MRI was reassuring.  I think with the CT scan findings and your urinalysis would be concerning for pyelonephritis.  It is difficult to attribute this all to the leg symptoms with the paresthesias that you are experiencing.  I think we should treat the infection and monitor your symptoms closely.  Please follow-up department doctor later this week.  If there is any escalation your symptoms please return to emergency department repeat assessment

## 2024-03-18 NOTE — ED TRIAGE NOTES
Patient reports bilateral lower back pain that is worse on the right for the past 3 weeks. Today she developed bilateral leg weakness.She was seen at [facility] and sent here for an MRI.      Triage Assessment (Adult)       Row Name 03/17/24 2017          Triage Assessment    Airway WDL WDL        Respiratory WDL    Respiratory WDL WDL        Cardiac WDL    Cardiac WDL WDL        Cognitive/Neuro/Behavioral WDL    Cognitive/Neuro/Behavioral WDL WDL

## 2024-03-18 NOTE — PHARMACY-ADMISSION MEDICATION HISTORY
Admission medication history completed at [facility]. Please see Pharmacist Admission Medication History note from 03/17/2024.

## 2024-03-18 NOTE — ED NOTES
"Pt reports R side low back pain x 3 weeks. Recalls no injury. But thinks it started after \"coughing a lot\". This morning she noted tingling down both legs. She is ambulating normally. MRI checklist complete. She is requesting something for anxiety prior to MRI. Her  drove her here tonight.  "

## 2024-03-18 NOTE — ED PROVIDER NOTES
EMERGENCY DEPARTMENT ENCOUNTER      NAME: Summer Smith  AGE: 43 year old female  YOB: 1980  MRN: [id number]  EVALUATION DATE & TIME: 3/17/2024  8:19 PM    PCP: Arturo Brooks    ED PROVIDER: Jarocho Bartholomew MD    Chief Complaint   Patient presents with    Back Pain    Extremity Weakness     FINAL IMPRESSION:  1. Urinary tract infection without hematuria, site unspecified    2. Pyelonephritis    3. Paresthesia          ED COURSE & MEDICAL DECISION MAKIN:12 PM Discussed patient with Dr. Fernandez, [facility] ED.  8:36 PM Met with patient for initial interview and exam.  10:34 PM I rechecked the patient and discussed results, discharge, follow up, and reasons to return to the ED. We discussed the plan for discharge and the patient is agreeable. Reviewed supportive cares, symptomatic treatment, outpatient follow up, and reasons to return to the Emergency Department. Patient to be discharged by ED RN.     Pertinent Labs & Imaging studies reviewed. (See chart for details)  43 year old female presents to the Emergency Department for evaluation of back pain leg pain and leg tingling with some numbness to the thighs.  Patient was initially evaluated at [facility] emergency department.  Body habitus did not allow for MRI imaging so was sent to [facility] with plan to proceed with MRI.  Was identified that the patient probably had a urinary tract infection based on workup done at [facility] with urinalysis concerning for infection and CT scan concerning for cystitis.  Administered a dose of antibiotics the patient ceftriaxone IV at arrival emergency department.  She had ambulated the emergency department.  No significant weakness.  Having sensory paresthesias to the thighs we proceeded with MRI imaging.  This did not demonstrate significant acute findings.  I discussed all this with the patient.  Would have concern for a component of pyelonephritis given her back pain is worse on  the right side no CT scan findings noted pyelonephritis with her previous ED visit tonight.  I did discuss all this with the patient.  I think overall given the negative MRI and stability patient symptoms she is appropriate for discharge home we will plan to treat her infection and monitor these other symptoms closely with plan for follow-up with her primary care doctor.  I did review return precautions with her and her  prior to discharge they are comfortable to plan of care.         At the conclusion of the encounter I discussed the results of all of the tests and the disposition. The questions were answered. The patient or family acknowledged understanding and was agreeable with the care plan.    Medical Decision Making  Obtained supplemental history:Supplemental history obtained?: No  Reviewed external records: External records reviewed?: Inpatient Record: 03/17/2024 [facility] ED  Care impacted by chronic illness:Other: Asthma  Care significantly affected by social determinants of health:N/A  Did you consider but not order tests?: Work up considered but not performed and documented in chart, if applicable  Did you interpret images independently?: Independent interpretation of ECG and images noted in documentation, when applicable.  Consultation discussion with other provider:Did you involve another provider (consultant, MH, pharmacy, etc.)?: I discussed the care with another health care provider, see documentation for details.  Discharge. No recommendations on prescription strength medication(s). See documentation for any additional details.      MEDICATIONS GIVEN IN THE EMERGENCY:  Medications   ibuprofen (ADVIL/MOTRIN) tablet 600 mg (600 mg Oral $Given 3/17/24 2102)   LORazepam (ATIVAN) injection 1 mg (1 mg Intravenous $Given 3/17/24 2104)   cefTRIAXone (ROCEPHIN) 1 g vial to attach to  mL bag for ADULTS or NS 50 mL bag for PEDS (0 g Intravenous Stopped 3/17/24 2227)       NEW  PRESCRIPTIONS STARTED AT TODAY'S ER VISIT  New Prescriptions    CEPHALEXIN (KEFLEX) 500 MG CAPSULE    Take 1 capsule (500 mg) by mouth 3 times daily for 7 days          =================================================================    HPI    Patient information was obtained from: Patient    Use of : N/A       Summer Smith is a 43 year old female with a pertinent history of DVT, PE, and asthma who presents to this ED by  for evaluation of back pain and extremity weakness.    Per chart review, the patient was seen for numbness and back pain at [facility] ED today, then transferred to [facility] ED. She reported anterior medial thigh numbness bilaterally going down to the lower leg. No weakness, bowel or bladder incontinence but reported pelvic fullness. CT Abdomen with small amount of air within the bladder and mild wall thickening. Patient was sent to [facility] ED for MRI.    The patient reports improvement of bilateral leg numbness with laying down, but reports waves of coldness and numbness with movement. She states that she was able to ambulate, but her legs felt fatigued. She reports pelvic pressure.    She had ibuprofen at 1:30 PM (~7 hours ago)    REVIEW OF SYSTEMS   Review of Systems   Genitourinary:         Pelvic pressure   Neurological:  Positive for numbness (in bilateral legs).   All other systems reviewed and are negative.      PAST MEDICAL HISTORY:  Past Medical History:   Diagnosis Date    Asthma     Cardiac murmur     DVT (deep vein thrombosis) in pregnancy 2007    DVT (deep venous thrombosis) (H) 2007    May-Thurner syndrome     Menorrhagia     Pulmonary embolism (H) 2007    no long term anticoagulation, no recurrence    Pulmonary embolism (H) 2007       PAST SURGICAL HISTORY:  Past Surgical History:   Procedure Laterality Date    ABLATION SAPHENOUS VEIN W/ RFA      AS LIGATN SHORT SAPHEN      had great saphenous vein removal    DAVINCI HYSTERECTOMY TOTAL       tubes removed, ovaries in place    HYSTERECTOMY  11/03/2016    IR EXTREMITY VENOGRAM LEFT  12/1/2016    OTHER SURGICAL HISTORY  2009    stenting into pelvis    OTHER SURGICAL HISTORY Left     saphenous vein removed    OTHER SURGICAL HISTORY      wisdom teeth extractions    STENT      venous in leg           CURRENT MEDICATIONS:    cephALEXin (KEFLEX) 500 MG capsule  albuterol (PROAIR HFA/PROVENTIL HFA/VENTOLIN HFA) 108 (90 Base) MCG/ACT inhaler  celecoxib (CELEBREX) 100 MG capsule  cetirizine (ZYRTEC) 10 MG tablet  fluticasone (FLONASE) 50 MCG/ACT nasal spray  Levocarnitine 500 MG TABS  Multiple Vitamin (MULTIVITAMIN ADULT PO)  phentermine (ADIPEX-P) 15 MG capsule  rivaroxaban ANTICOAGULANT (XARELTO ANTICOAGULANT) 20 MG TABS tablet  [START ON 5/7/2024] tirzepatide-Weight Management (ZEPBOUND) 10 MG/0.5ML prefilled pen  tirzepatide-Weight Management (ZEPBOUND) 5 MG/0.5ML prefilled pen  [START ON 4/9/2024] tirzepatide-Weight Management (ZEPBOUND) 7.5 MG/0.5ML prefilled pen  topiramate (TOPAMAX) 50 MG tablet  vitamin D3 (CHOLECALCIFEROL) 2000 units (50 mcg) tablet        ALLERGIES:  Allergies   Allergen Reactions    Phytonadione Anaphylaxis     IV Vitamin K    Sulfa Antibiotics Hives    Vitamin K Anaphylaxis     IV Vitamin K only    Penicillin G Rash    Amoxicillin-Pot Clavulanate Rash       FAMILY HISTORY:  Family History   Problem Relation Age of Onset    Thyroid Disease Mother         partial lobectomy    Diabetes Father     Hypertension Father     Hyperlipidemia Father     Colon Cancer Maternal Grandfather     Liver Cancer Maternal Grandfather     Cancer Paternal Grandfather     Thyroid Disease Sister     Thyroid Disease Sister     No Known Problems Sister     No Known Problems Sister     Endocrine Disease Daughter         Lucille, has appointment end of July 2016    Autism Spectrum Disorder Daughter     Spina bifida Daughter        SOCIAL HISTORY:   Social History     Socioeconomic History    Marital status:  "   Tobacco Use    Smoking status: Never    Smokeless tobacco: Never   Vaping Use    Vaping Use: Never used   Substance and Sexual Activity    Alcohol use: Yes     Comment: rare    Drug use: No    Sexual activity: Yes     Partners: Male     Birth control/protection: Male Surgical   Other Topics Concern    Parent/sibling w/ CABG, MI or angioplasty before 65F 55M? No       VITALS:  /65   Pulse 79   Temp 97.5  F (36.4  C) (Temporal)   Resp 16   Ht 1.651 m (5' 5\")   Wt 127.9 kg (282 lb)   LMP 08/01/2016   SpO2 96%   BMI 46.93 kg/m      PHYSICAL EXAM    PHYSICAL EXAM    VITAL SIGNS: /65   Pulse 79   Temp 97.5  F (36.4  C) (Temporal)   Resp 16   Ht 1.651 m (5' 5\")   Wt 127.9 kg (282 lb)   LMP 08/01/2016   SpO2 96%   BMI 46.93 kg/m    Constitutional:  Well developed, well nourished, NAD  EYES: Conjunctivae clear, no discharge  HENT: Atraumatic, normocephalic, bilateral external ears normal.  Oropharynx moist. Nose normal.   Neck: Normal ROM , Supple   Respiratory:  No respiratory distress, normal nonlabored respirations.   Cardiovascular:  Distal perfusion appears intact  Musculoskeletal:  No edema appreciated, Good range of motion in all major joints.   Integument:  Warm, Dry, No erythema, No rash.   Neurologic:  Alert and oriented. No focal deficits noted.  Ambulatory  Psychiatric:  Affect normal, Judgment normal, Mood normal.      LAB:  All pertinent labs reviewed and interpreted.  Results for orders placed or performed during the hospital encounter of 03/17/24   Lumbar spine MRI w/o contrast    Impression    IMPRESSION:  1.  Mild lumbar spondylosis, not significantly changed since 04/10/2021.  2.  No significant canal or foraminal stenosis.       RADIOLOGY:  Reviewed all pertinent imaging. Please see official radiology report.  Lumbar spine MRI w/o contrast   Final Result   IMPRESSION:   1.  Mild lumbar spondylosis, not significantly changed since 04/10/2021.   2.  No significant canal " or foraminal stenosis.          EKG:    None    PROCEDURES:   None        I, Sherri Cordova, am serving as a scribe to document services personally performed by Jarocho Bartholomew MD based on my observation and the provider's statements to me. I, Jarocho Bartholomew MD, attest that Sherri Cordova is acting in a scribe capacity, has observed my performance of the services and has documented them in accordance with my direction.    Jarocho Bartholomew MD  [facility] EMERGENCY DEPARTMENT  [address] [place] [id number] [phone]       Jarocho Bartholomew MD  03/17/24 5098

## 2024-03-18 NOTE — ED NOTES
REPORT FROM [facility] RN- PT COMES IN WITH BILATERAL LEG WEAKNESS AND LOW BACK PAIN.  UNABLE TO FIT IN MRI MACHINE AT [facility], BEING TRANSFERRED HERE BY PRIVATE CAR FOR LAILA.  18G IV RIGHT ARM.

## 2024-03-20 ENCOUNTER — OFFICE VISIT (OUTPATIENT)
Dept: FAMILY MEDICINE | Facility: CLINIC | Age: 44
End: 2024-03-20
Payer: COMMERCIAL

## 2024-03-20 VITALS
WEIGHT: 280 LBS | DIASTOLIC BLOOD PRESSURE: 100 MMHG | RESPIRATION RATE: 16 BRPM | BODY MASS INDEX: 46.65 KG/M2 | TEMPERATURE: 98.7 F | SYSTOLIC BLOOD PRESSURE: 144 MMHG | HEIGHT: 65 IN | HEART RATE: 78 BPM | OXYGEN SATURATION: 99 %

## 2024-03-20 DIAGNOSIS — N12 PYELONEPHRITIS: Primary | ICD-10-CM

## 2024-03-20 LAB
ALBUMIN UR-MCNC: NEGATIVE MG/DL
APPEARANCE UR: CLEAR
BILIRUB UR QL STRIP: NEGATIVE
COLOR UR AUTO: YELLOW
ERYTHROCYTE [DISTWIDTH] IN BLOOD BY AUTOMATED COUNT: 12.3 % (ref 10–15)
GLUCOSE UR STRIP-MCNC: NEGATIVE MG/DL
HCT VFR BLD AUTO: 47.6 % (ref 35–47)
HGB BLD-MCNC: 15.1 G/DL (ref 11.7–15.7)
HGB UR QL STRIP: NEGATIVE
KETONES UR STRIP-MCNC: NEGATIVE MG/DL
LEUKOCYTE ESTERASE UR QL STRIP: NEGATIVE
MCH RBC QN AUTO: 27.2 PG (ref 26.5–33)
MCHC RBC AUTO-ENTMCNC: 31.7 G/DL (ref 31.5–36.5)
MCV RBC AUTO: 86 FL (ref 78–100)
NITRATE UR QL: NEGATIVE
PH UR STRIP: 5.5 [PH] (ref 5–7)
PLATELET # BLD AUTO: 334 10E3/UL (ref 150–450)
RBC # BLD AUTO: 5.56 10E6/UL (ref 3.8–5.2)
SP GR UR STRIP: 1.01 (ref 1–1.03)
UROBILINOGEN UR STRIP-ACNC: 0.2 E.U./DL
WBC # BLD AUTO: 7.7 10E3/UL (ref 4–11)

## 2024-03-20 PROCEDURE — 99214 OFFICE O/P EST MOD 30 MIN: CPT | Performed by: NURSE PRACTITIONER

## 2024-03-20 PROCEDURE — 85027 COMPLETE CBC AUTOMATED: CPT | Performed by: NURSE PRACTITIONER

## 2024-03-20 PROCEDURE — 36415 COLL VENOUS BLD VENIPUNCTURE: CPT | Performed by: NURSE PRACTITIONER

## 2024-03-20 PROCEDURE — 81003 URINALYSIS AUTO W/O SCOPE: CPT | Performed by: NURSE PRACTITIONER

## 2024-03-20 RX ORDER — CIPROFLOXACIN 500 MG/1
500 TABLET, FILM COATED ORAL 2 TIMES DAILY
Qty: 14 TABLET | Refills: 0 | Status: SHIPPED | OUTPATIENT
Start: 2024-03-20 | End: 2024-03-27

## 2024-03-20 ASSESSMENT — ASTHMA QUESTIONNAIRES
QUESTION_2 LAST FOUR WEEKS HOW OFTEN HAVE YOU HAD SHORTNESS OF BREATH: NOT AT ALL
ACT_TOTALSCORE: 23
QUESTION_5 LAST FOUR WEEKS HOW WOULD YOU RATE YOUR ASTHMA CONTROL: WELL CONTROLLED
QUESTION_1 LAST FOUR WEEKS HOW MUCH OF THE TIME DID YOUR ASTHMA KEEP YOU FROM GETTING AS MUCH DONE AT WORK, SCHOOL OR AT HOME: NONE OF THE TIME
QUESTION_4 LAST FOUR WEEKS HOW OFTEN HAVE YOU USED YOUR RESCUE INHALER OR NEBULIZER MEDICATION (SUCH AS ALBUTEROL): ONCE A WEEK OR LESS
ACT_TOTALSCORE: 23
QUESTION_3 LAST FOUR WEEKS HOW OFTEN DID YOUR ASTHMA SYMPTOMS (WHEEZING, COUGHING, SHORTNESS OF BREATH, CHEST TIGHTNESS OR PAIN) WAKE YOU UP AT NIGHT OR EARLIER THAN USUAL IN THE MORNING: NOT AT ALL

## 2024-03-20 ASSESSMENT — PAIN SCALES - GENERAL: PAINLEVEL: SEVERE PAIN (6)

## 2024-03-20 NOTE — PROGRESS NOTES
Assessment & Plan     Pyelonephritis  Given Keflex by ER - symptoms are not improving.  Repeat UA negative; which isn't unexpected given antibiotic use.  CBC normal.  Will stop the Keflex and start cipro 500 mg BID for one week  If no improvement within 48 hours, patient should return to clinic.    - UA Macroscopic with reflex to Microscopic and Culture - Lab Collect; Future  - CBC with platelets; Future  - UA Macroscopic with reflex to Microscopic and Culture - Lab Collect  - CBC with platelets  - ciprofloxacin (CIPRO) 500 MG tablet; Take 1 tablet (500 mg) by mouth 2 times daily for 7 days    The risks, benefits and treatment options of prescribed medications or other treatments have been discussed with the patient. The patient verbalized their understanding and should call or follow up if no improvement or if they develop further problems.  JIE Francois   Summer is a 43 year old, presenting for the following health issues:  ER F/U        3/20/2024     1:45 PM   Additional Questions   Roomed by Bisi VALENCIA   Accompanied by self     HPI       ED/UC Followup:    Facility:  [facility] ER  Date of visit: 3/17/24  Reason for visit: back pain/ extremity weakness  Urinary tract infection - had some hematuria but no other UTI symptoms.  Pyelonephritis  Paresthesia - inner thighs were tingling     Current Status: patient still c/o bilateral flank pain - worsening  Chills  Nausea  Feels like throwing up if she pushes on her flank area  Is on Keflex X 7 days  Had a CT abdomen/pelvis and MRI lumbar spine in the ER - negative except for cystitis.  Paresthesias are resolved.  However feels ill, weak all over  No further hematuria.  No dysuria    UC not performed in ER            Review of Systems  Constitutional, HEENT, cardiovascular, pulmonary, gi and gu systems are negative, except as otherwise noted.      Objective    BP (!) 144/100 (BP Location: Right arm, Patient  "Position: Sitting, Cuff Size: Adult Large)   Pulse 78   Temp 98.7  F (37.1  C) (Tympanic)   Resp 16   Ht 1.651 m (5' 5\")   Wt 127 kg (280 lb)   LMP 08/01/2016 (Exact Date)   SpO2 99%   BMI 46.59 kg/m    Body mass index is 46.59 kg/m .  Physical Exam   GENERAL: alert and no distress  HENT: ear canals and TM's normal, nose and mouth without ulcers or lesions  NECK: no adenopathy, no asymmetry, masses, or scars  RESP: lungs clear to auscultation - no rales, rhonchi or wheezes  CV: regular rate and rhythm, normal S1 S2, no S3 or S4, no murmur, click or rub, no peripheral edema  ABDOMEN: soft, nontender, no hepatosplenomegaly, no masses and bowel sounds normal  MS: no gross musculoskeletal defects noted, no edema    Results for orders placed or performed in visit on 03/20/24 (from the past 24 hour(s))   UA Macroscopic with reflex to Microscopic and Culture - Lab Collect    Specimen: Urine, Clean Catch   Result Value Ref Range    Color Urine Yellow Colorless, Straw, Light Yellow, Yellow    Appearance Urine Clear Clear    Glucose Urine Negative Negative mg/dL    Bilirubin Urine Negative Negative    Ketones Urine Negative Negative mg/dL    Specific Gravity Urine 1.015 1.003 - 1.035    Blood Urine Negative Negative    pH Urine 5.5 5.0 - 7.0    Protein Albumin Urine Negative Negative mg/dL    Urobilinogen Urine 0.2 0.2, 1.0 E.U./dL    Nitrite Urine Negative Negative    Leukocyte Esterase Urine Negative Negative    Narrative    Microscopic not indicated   CBC with platelets   Result Value Ref Range    WBC Count 7.7 4.0 - 11.0 10e3/uL    RBC Count 5.56 (H) 3.80 - 5.20 10e6/uL    Hemoglobin 15.1 11.7 - 15.7 g/dL    Hematocrit 47.6 (H) 35.0 - 47.0 %    MCV 86 78 - 100 fL    MCH 27.2 26.5 - 33.0 pg    MCHC 31.7 31.5 - 36.5 g/dL    RDW 12.3 10.0 - 15.0 %    Platelet Count 334 150 - 450 10e3/uL           Signed Electronically by: Hetal Enriquez, KINGA CNP    "

## 2024-04-17 ENCOUNTER — LAB (OUTPATIENT)
Dept: LAB | Facility: CLINIC | Age: 44
End: 2024-04-17
Payer: COMMERCIAL

## 2024-04-17 DIAGNOSIS — E66.9 OBESITY: Primary | ICD-10-CM

## 2024-04-17 LAB
ALBUMIN SERPL BCG-MCNC: 4.1 G/DL (ref 3.5–5.2)
ALP SERPL-CCNC: 60 U/L (ref 40–150)
ALT SERPL W P-5'-P-CCNC: 24 U/L (ref 0–50)
ANION GAP SERPL CALCULATED.3IONS-SCNC: 7 MMOL/L (ref 7–15)
AST SERPL W P-5'-P-CCNC: 18 U/L (ref 0–45)
BILIRUB SERPL-MCNC: 0.3 MG/DL
BUN SERPL-MCNC: 8.2 MG/DL (ref 6–20)
CALCIUM SERPL-MCNC: 9.4 MG/DL (ref 8.6–10)
CHLORIDE SERPL-SCNC: 102 MMOL/L (ref 98–107)
CHOLEST SERPL-MCNC: 232 MG/DL
CREAT SERPL-MCNC: 0.75 MG/DL (ref 0.51–0.95)
DEPRECATED HCO3 PLAS-SCNC: 30 MMOL/L (ref 22–29)
EGFRCR SERPLBLD CKD-EPI 2021: >90 ML/MIN/1.73M2
FASTING STATUS PATIENT QL REPORTED: YES
GLUCOSE SERPL-MCNC: 92 MG/DL (ref 70–99)
HBA1C MFR BLD: 5.8 % (ref 0–5.6)
HDLC SERPL-MCNC: 45 MG/DL
LDLC SERPL CALC-MCNC: 159 MG/DL
NONHDLC SERPL-MCNC: 187 MG/DL
POTASSIUM SERPL-SCNC: 4.6 MMOL/L (ref 3.4–5.3)
PROT SERPL-MCNC: 7.3 G/DL (ref 6.4–8.3)
SODIUM SERPL-SCNC: 139 MMOL/L (ref 135–145)
TRIGL SERPL-MCNC: 142 MG/DL
TSH SERPL DL<=0.005 MIU/L-ACNC: 2.53 UIU/ML (ref 0.3–4.2)

## 2024-04-17 PROCEDURE — 36415 COLL VENOUS BLD VENIPUNCTURE: CPT

## 2024-04-17 PROCEDURE — 80061 LIPID PANEL: CPT

## 2024-04-17 PROCEDURE — 80053 COMPREHEN METABOLIC PANEL: CPT

## 2024-04-17 PROCEDURE — 84443 ASSAY THYROID STIM HORMONE: CPT

## 2024-04-17 PROCEDURE — 83036 HEMOGLOBIN GLYCOSYLATED A1C: CPT

## 2024-04-22 ENCOUNTER — OFFICE VISIT (OUTPATIENT)
Dept: FAMILY MEDICINE | Facility: CLINIC | Age: 44
End: 2024-04-22
Payer: COMMERCIAL

## 2024-04-22 VITALS
TEMPERATURE: 98.4 F | WEIGHT: 285 LBS | HEART RATE: 83 BPM | DIASTOLIC BLOOD PRESSURE: 86 MMHG | SYSTOLIC BLOOD PRESSURE: 142 MMHG | BODY MASS INDEX: 47.48 KG/M2 | OXYGEN SATURATION: 98 % | HEIGHT: 65 IN

## 2024-04-22 DIAGNOSIS — R35.0 URINE FREQUENCY: Primary | ICD-10-CM

## 2024-04-22 DIAGNOSIS — N30.00 ACUTE CYSTITIS WITHOUT HEMATURIA: ICD-10-CM

## 2024-04-22 LAB
ALBUMIN UR-MCNC: NEGATIVE MG/DL
APPEARANCE UR: CLEAR
BILIRUB UR QL STRIP: NEGATIVE
COLOR UR AUTO: YELLOW
GLUCOSE UR STRIP-MCNC: NEGATIVE MG/DL
HGB UR QL STRIP: NEGATIVE
KETONES UR STRIP-MCNC: NEGATIVE MG/DL
LEUKOCYTE ESTERASE UR QL STRIP: NEGATIVE
NITRATE UR QL: NEGATIVE
PH UR STRIP: 5.5 [PH] (ref 5–7)
SP GR UR STRIP: 1.02 (ref 1–1.03)
UROBILINOGEN UR STRIP-ACNC: 0.2 E.U./DL

## 2024-04-22 PROCEDURE — 99213 OFFICE O/P EST LOW 20 MIN: CPT | Performed by: PHYSICIAN ASSISTANT

## 2024-04-22 PROCEDURE — 81003 URINALYSIS AUTO W/O SCOPE: CPT | Performed by: PHYSICIAN ASSISTANT

## 2024-04-22 RX ORDER — CIPROFLOXACIN 500 MG/1
500 TABLET, FILM COATED ORAL 2 TIMES DAILY
Qty: 14 TABLET | Refills: 0 | Status: SHIPPED | OUTPATIENT
Start: 2024-04-22 | End: 2024-04-29

## 2024-04-22 NOTE — PROGRESS NOTES
Assessment & Plan       ICD-10-CM    1. Urine frequency  R35.0 UA Macroscopic with reflex to Microscopic and Culture - Clinic Collect      2. Acute cystitis without hematuria  N30.00 ciprofloxacin (CIPRO) 500 MG tablet     Adult Urology  Referral        2nd episode of acute cystitis symptoms with UA unremarkable. Given history of same story 1 month prior with normal UA and CT showing cystitis will treat with ciprofloxacin (as per what worked last month) and have her follow up with urology               CONSULTATION/REFERRAL to urology    Subjective   Summer is a 43 year old, presenting for the following health issues:  UTI        4/22/2024     2:32 PM   Additional Questions   Roomed by ROSA MARIA Paige     History of Present Illness       Reason for visit:  Uti concern  Symptom onset:  3-7 days ago  Symptoms include:  Suprapubic pain, frequency, urgency, nausea  Symptom intensity:  Moderate  Symptom progression:  Worsening  Had these symptoms before:  Yes  Has tried/received treatment for these symptoms:  Yes  Previous treatment was successful:  Yes  Prior treatment description:  Cipro  What makes it worse:  No  What makes it better:  No    She eats 2-3 servings of fruits and vegetables daily.She consumes 1 sweetened beverage(s) daily.She exercises with enough effort to increase her heart rate 10 to 19 minutes per day.  She exercises with enough effort to increase her heart rate 3 or less days per week. She is missing 1 dose(s) of medications per week.     Had these same symptoms last month  Went to  for presumed UTI and was treated based on symptoms with keflex  Culture was negative but her symptoms of suprapubic pain and back pain worsened so ended up getting a CT which showed cystitis  Was put on ciprofloxacin and symptoms did resolve    About a week ago had the same symptoms start although more intermittent at first  Have been progressing over the week  Now again have the same increased suprapubic pain  "          Review of Systems  Remainder of ROS obtained and found to be negative other than that which was documented above        Objective    BP (!) 142/86   Pulse 83   Temp 98.4  F (36.9  C) (Tympanic)   Ht 1.651 m (5' 5\")   Wt 129.3 kg (285 lb)   LMP 08/01/2016 (Exact Date)   SpO2 98%   BMI 47.43 kg/m    Body mass index is 47.43 kg/m .  Physical Exam   GENERAL: alert and no distress  ABDOMEN: + suprapubic pain to palpation    Results for orders placed or performed in visit on 04/22/24 (from the past 24 hour(s))   UA Macroscopic with reflex to Microscopic and Culture - Clinic Collect    Specimen: Urine, Clean Catch   Result Value Ref Range    Color Urine Yellow Colorless, Straw, Light Yellow, Yellow    Appearance Urine Clear Clear    Glucose Urine Negative Negative mg/dL    Bilirubin Urine Negative Negative    Ketones Urine Negative Negative mg/dL    Specific Gravity Urine 1.020 1.003 - 1.035    Blood Urine Negative Negative    pH Urine 5.5 5.0 - 7.0    Protein Albumin Urine Negative Negative mg/dL    Urobilinogen Urine 0.2 0.2, 1.0 E.U./dL    Nitrite Urine Negative Negative    Leukocyte Esterase Urine Negative Negative    Narrative    Microscopic not indicated       Diagnostic Tests: Reviewed prior imaging from last month that showed the cystitis along with UA at that time    Signed Electronically by: Regina Linares PA-C    "

## 2024-04-30 ENCOUNTER — OFFICE VISIT (OUTPATIENT)
Dept: UROLOGY | Facility: CLINIC | Age: 44
End: 2024-04-30
Attending: PHYSICIAN ASSISTANT
Payer: COMMERCIAL

## 2024-04-30 VITALS
SYSTOLIC BLOOD PRESSURE: 118 MMHG | BODY MASS INDEX: 47.48 KG/M2 | WEIGHT: 285 LBS | TEMPERATURE: 97.7 F | OXYGEN SATURATION: 98 % | HEIGHT: 65 IN | HEART RATE: 60 BPM | DIASTOLIC BLOOD PRESSURE: 70 MMHG

## 2024-04-30 DIAGNOSIS — N30.00 ACUTE CYSTITIS WITHOUT HEMATURIA: ICD-10-CM

## 2024-04-30 DIAGNOSIS — R93.41 ABNORMAL COMPUTED TOMOGRAPHY OF BLADDER: Primary | ICD-10-CM

## 2024-04-30 PROCEDURE — 99203 OFFICE O/P NEW LOW 30 MIN: CPT | Performed by: STUDENT IN AN ORGANIZED HEALTH CARE EDUCATION/TRAINING PROGRAM

## 2024-04-30 ASSESSMENT — PAIN SCALES - GENERAL: PAINLEVEL: NO PAIN (0)

## 2024-04-30 NOTE — PATIENT INSTRUCTIONS
"BLADDER IRRITANTS    Below is a list of foods that can irritate the bladder.     Caffeinated soft drinks  Coffee.  Tea.  Chocolate.  Acidic juices and fruits such as cranberry, lemon, orange, pineapple and other citrus as well as tart apple or cherry   Alcoholic beverages  Carbonated drinks, especially nomi  Aspartame/Nutrasweet.  Spicy foods  Tomato products    Substitutions that you can make in your daily diet:  - Herbals teas that don't contain caffeine or a large amount of citrus  - Ovaltine instead of chocolate drinks  - Less acidic fruits such as blueberry, banana, sweet (rather than tart) apples, pears, papaya.      Either:  - Consider limiting or avoiding these foods then monitoring the impact on your urinary symptoms   - Or consider an \"elimination diet.\"  Start by eliminating all these foods for 2 weeks.  Assess for progress.  If you are feeling better, slowly add foods back - 1 new food every 3-5 days and assess for worsening.  This way you can pinpoint exactly which foods worsen your bladder symptoms.   "

## 2024-04-30 NOTE — PROGRESS NOTES
Chief Complaint:   LUTS         History of Present Illness:   Summer Smith is a 43 year old female with a history of pulmonary embolism and May Thurner syndrome who presents for evaluation of LUTS.     The patient was treated for possible pyelonephritis in the ED on 3/17/2024 with a course of ciprofloxacin. UA was notable for only 5 RBCs. CT abdomen pelvis with contrast was notable for mild bladder wall thickening and a small amount of air within the bladder.     Repeat UAs on 3/20/2024 and 4/22/2024 were unremarkable. She was treated with a course of ciprofloxacin on 4/22/2024 despite unremarkable UA.     The patient reports episodes of suprapubic pain, increased urinary frequency and urgency, and low back pain. She denies dysuria and gross hematuria.     The patient is a nurse practitioner.     She reports regular bowel movements. She has noticed more fecal urgency in the last few months.     She drinks water including flavored water, 1-2 cups of coffee, occasional green tea, rare soda, and rare alcohol.      She had a hysterectomy in 2017. She denies a history of any urologic surgeries.          Past Medical History:     Past Medical History:   Diagnosis Date    Asthma     Cardiac murmur     DVT (deep vein thrombosis) in pregnancy 2007    DVT (deep venous thrombosis) (H) 2007    May-Thurner syndrome     Menorrhagia     Pulmonary embolism (H) 2007    no long term anticoagulation, no recurrence    Pulmonary embolism (H) 2007            Past Surgical History:     Past Surgical History:   Procedure Laterality Date    ABLATION SAPHENOUS VEIN W/ RFA      AS LIGATN SHORT SAPHEN      had great saphenous vein removal    DAVINCI HYSTERECTOMY TOTAL      tubes removed, ovaries in place    HYSTERECTOMY  11/03/2016    IR EXTREMITY VENOGRAM LEFT  12/1/2016    OTHER SURGICAL HISTORY  2009    stenting into pelvis    OTHER SURGICAL HISTORY Left     saphenous vein removed    OTHER SURGICAL HISTORY      wisdom teeth  extractions    STENT      venous in leg            Medications     Current Outpatient Medications   Medication Sig Dispense Refill    albuterol (PROAIR HFA/PROVENTIL HFA/VENTOLIN HFA) 108 (90 Base) MCG/ACT inhaler Inhale 2 puffs into the lungs every 4 hours as needed for wheezing (Patient not taking: Reported on 4/22/2024) 18 g 1    celecoxib (CELEBREX) 100 MG capsule Take 1 capsule (100 mg) by mouth 2 times daily as needed for moderate pain 180 capsule 3    cetirizine (ZYRTEC) 10 MG tablet Take 10 mg by mouth daily as needed for allergies (seasonal)      fluticasone (FLONASE) 50 MCG/ACT nasal spray Spray 1-2 sprays into both nostrils daily as needed for rhinitis or allergies (seasonal)      Levocarnitine 500 MG TABS Take 1,000 mg by mouth daily      Multiple Vitamin (MULTIVITAMIN ADULT PO) Take 1 tablet by mouth daily      phentermine (ADIPEX-P) 15 MG capsule Take 1 capsule (15 mg) by mouth every morning 90 capsule 0    rivaroxaban ANTICOAGULANT (XARELTO ANTICOAGULANT) 20 MG TABS tablet Take 1 tablet (20 mg) by mouth daily (with dinner) 90 tablet 3    [START ON 5/7/2024] tirzepatide-Weight Management (ZEPBOUND) 10 MG/0.5ML prefilled pen Inject 0.5 mLs (10 mg) Subcutaneous every 7 days (Patient not taking: Reported on 3/20/2024) 2 mL 0    tirzepatide-Weight Management (ZEPBOUND) 7.5 MG/0.5ML prefilled pen Inject 0.5 mLs (7.5 mg) Subcutaneous every 7 days for 28 days (Patient not taking: Reported on 3/17/2024) 2 mL 0    topiramate (TOPAMAX) 50 MG tablet Take 50 mg by mouth daily      vitamin D3 (CHOLECALCIFEROL) 2000 units (50 mcg) tablet Take  mcg by mouth daily More sun/Summer months: take 1 tab (50 mcg) daily  Less sun/Winter months: take 2 tabs (100 mcg) daily       No current facility-administered medications for this visit.            Allergies:   Phytonadione, Sulfa antibiotics, Vitamin k, Penicillin g, and Amoxicillin-pot clavulanate         Review of Systems:  From intake questionnaire   Negative 14  "system review except as noted on HPI, nurse's note.         Physical Exam:   Patient is a 43 year old  female   Vitals: Blood pressure 118/70, pulse 60, temperature 97.7  F (36.5  C), temperature source Tympanic, height 1.651 m (5' 5\"), weight 129.3 kg (285 lb), last menstrual period 08/01/2016, SpO2 98%.  General Appearance Adult: Alert, no acute distress, oriented.  Lungs: Non-labored breathing.  Heart: No obvious jugular venous distension present.  Neuro: Alert, oriented, speech and mentation normal      Labs and Pathology:    I personally reviewed all applicable laboratory data and went over findings with patient  Significant for:    CBC RESULTS:  Recent Labs   Lab Test 03/20/24  1414 03/17/24  1724 10/28/22  0949 03/03/21  0812 08/05/20  1434   WBC 7.7 7.8  --  5.0 6.7   HGB 15.1 14.4 15.1 14.5 14.9    332  --  283 256        BMP RESULTS:  Recent Labs   Lab Test 04/17/24  1424 03/17/24  1724 02/02/23  1446 03/03/21  0812 08/05/20  1434 01/17/20  0631 11/12/18  1424    140 139 140 137 139 138   POTASSIUM 4.6 4.0 4.2 4.0 4.6 4.1 4.2   CHLORIDE 102 106 100 107 106 107 103   CO2 30* 26 27 28 28 23 28   ANIONGAP 7 8 12 5 3 9 7   GLC 92 98 99 92 90 99 89   BUN 8.2 12.5 16.2 10 10 8 12   CR 0.75 0.83 1.05* 0.88 0.76 0.69 0.82   GFRESTIMATED >90 90 68 82 >90 >60 79   GFRESTBLACK  --   --   --  >90 >90 >60 >90   SARAH 9.4 9.6 9.6 9.0 9.4 9.4 9.6       UA RESULTS:   Recent Labs   Lab Test 04/22/24  1433 03/20/24  1414 03/17/24  1935 11/10/18  1737   SG 1.020 1.015 <1.005 1.016   URINEPH 5.5 5.5 7.0 5.0   NITRITE Negative Negative Negative Negative   RBCU  --   --  7* 0   WBCU  --   --  1 2            Assessment and Plan:     Assessment: 43 year old female seen in evaluation for suprapubic pain, urinary frequency, and urgency. The patient was treated for possible pyelonephritis in the ED on 3/17/2024 with a course of ciprofloxacin. UA was notable for only 5 RBCs. CT abdomen pelvis with contrast was notable for " mild bladder wall thickening and a small amount of air within the bladder.     Repeat UAs on 3/20/2024 and 4/22/2024 were unremarkable. She was treated with a course of ciprofloxacin on 4/22/2024 despite unremarkable UA.     We discussed possible causes for her symptoms including infection, overactive bladder, and pelvic floor dysfunction. We discussed the utility of urodynamics and cystoscopy. At this time, she elected to start with a renal US to follow up on abnormal bladder findings from her CT in March. If the bladder appears abnormal, will consider cystoscopy.     Plan:  Renal and bladder US to follow up on bladder wall thickening and air in the bladder.     SERGIO COLEMAN PA-C  Department of Urology

## 2024-05-05 ENCOUNTER — HOSPITAL ENCOUNTER (OUTPATIENT)
Dept: ULTRASOUND IMAGING | Facility: CLINIC | Age: 44
Discharge: HOME | End: 2024-05-05
Attending: STUDENT IN AN ORGANIZED HEALTH CARE EDUCATION/TRAINING PROGRAM | Admitting: STUDENT IN AN ORGANIZED HEALTH CARE EDUCATION/TRAINING PROGRAM
Payer: COMMERCIAL

## 2024-05-05 DIAGNOSIS — R93.41 ABNORMAL COMPUTED TOMOGRAPHY OF BLADDER: ICD-10-CM

## 2024-05-05 PROCEDURE — 76770 US EXAM ABDO BACK WALL COMP: CPT

## 2024-05-14 DIAGNOSIS — E66.01 MORBID (SEVERE) OBESITY DUE TO EXCESS CALORIES (H): ICD-10-CM

## 2024-05-14 RX ORDER — TOPIRAMATE 50 MG/1
50 TABLET, FILM COATED ORAL DAILY
Qty: 90 TABLET | Refills: 1 | Status: SHIPPED | OUTPATIENT
Start: 2024-05-14 | End: 2024-05-16

## 2024-05-14 NOTE — TELEPHONE ENCOUNTER
Requested Prescriptions   Pending Prescriptions Disp Refills    topiramate (TOPAMAX) 50 MG tablet [Pharmacy Med Name: TOPIRAMATE 50 MG TABLET] 83 tablet 1     Sig: TAKE 0.5 TABLETS (25 MG) BY MOUTH DAILY FOR 14 DAYS, THEN 1 TABLET (50 MG) DAILY FOR 90 DAYS.       Anti-Seizure Meds Protocol  Failed - 5/14/2024 12:43 AM        Failed - Review Authorizing provider's last note.      Refer to last progress notes: confirm request is for original authorizing provider (cannot be through other providers).          Failed - Normal CBC on file in past 26 months     Recent Labs   Lab Test 03/20/24  1414   WBC 7.7   RBC 5.56*   HGB 15.1   HCT 47.6*                    Passed - Recent (12 mo) or future (30 days) visit within the authorizing provider's specialty     The patient must have completed an in-person or virtual visit within the past 12 months or has a future visit scheduled within the next 90 days with the authorizing provider s specialty.  Urgent care and e-visits do not quality as an office visit for this protocol.          Passed - Normal ALT or AST on file in past 26 months     Recent Labs   Lab Test 04/17/24  1424   ALT 24     Recent Labs   Lab Test 04/17/24  1424   AST 18             Passed - Normal platelet count on file in past 26 months     Recent Labs   Lab Test 03/20/24  1414                  Passed - Medication is active on med list        Passed - No active pregnancy on record        Passed - No positive pregnancy test in last 12 months

## 2024-05-16 ENCOUNTER — OFFICE VISIT (OUTPATIENT)
Dept: FAMILY MEDICINE | Facility: CLINIC | Age: 44
End: 2024-05-16
Payer: COMMERCIAL

## 2024-05-16 VITALS
BODY MASS INDEX: 47.18 KG/M2 | HEIGHT: 65 IN | RESPIRATION RATE: 16 BRPM | SYSTOLIC BLOOD PRESSURE: 114 MMHG | DIASTOLIC BLOOD PRESSURE: 88 MMHG | OXYGEN SATURATION: 100 % | HEART RATE: 64 BPM | WEIGHT: 283.2 LBS | TEMPERATURE: 98.1 F

## 2024-05-16 DIAGNOSIS — S86.002A INJURY OF LEFT ACHILLES TENDON, INITIAL ENCOUNTER: Primary | ICD-10-CM

## 2024-05-16 PROCEDURE — 99213 OFFICE O/P EST LOW 20 MIN: CPT | Performed by: NURSE PRACTITIONER

## 2024-05-16 NOTE — LETTER
May 16, 2024      Summer Smith  [id number]  [facility] [address]        To Whom It May Concern:    Summer Smith  was seen on 5/16.  Please excuse her from missed work as needed for an acute injury. She may need more frequent rest breaks due to injury.        Sincerely,        KINGA Pearce CNP

## 2024-05-16 NOTE — PROGRESS NOTES
DME (Durable Medical Equipment) Orders and Documentation  Orders Placed This Encounter   Procedures     Ankle/Foot Bracing Supplies Order Walking Boot; Left; Pneumatic; Short        The patient was assessed and it was determined the patient is in need of the following listed DME Supplies/Equipment. Please complete supporting documentation below to demonstrate medical necessity.

## 2024-05-16 NOTE — PROGRESS NOTES
Assessment & Plan     Injury of left Achilles tendon, initial encounter    - Ankle/Foot Bracing Supplies Order Walking Boot; Left; Pneumatic; Short        CONSULTATION/REFERRAL to Podiatry for any ongoing symptoms after 1-2 weeks.    FUTURE APPOINTMENTS:       - Follow-up for annual visit or as needed    See Patient Instructions    Santos Wheeler is a 43 year old, presenting for the following health issues:  Musculoskeletal Problem        5/16/2024     9:38 AM   Additional Questions   Roomed by Marlene ESPINOSA     History of Present Illness       Reason for visit:  Left low leg pain  Symptom onset:  1-3 days ago    She eats 2-3 servings of fruits and vegetables daily.She consumes 0 sweetened beverage(s) daily.She exercises with enough effort to increase her heart rate 10 to 19 minutes per day.  She exercises with enough effort to increase her heart rate 3 or less days per week. She is missing 1 dose(s) of medications per week.         Pain History:  When did you first notice your pain? 4 days    Have you seen anyone else for your pain? No  How has your pain affected your ability to work? Not applicable  Where in your body do you have pain? Musculoskeletal problem/pain  Onset/Duration: 4 days   Description  Location: foot, above ankle- left  Joint Swelling: YES- lump and feels tight   Redness: No  Pain: YES- burning   Warmth: No  Intensity:  moderate  Progression of Symptoms:  worsening  Accompanying signs and symptoms:   Fevers: No  Numbness/tingling/weakness: No  History  Trauma to the area: No- works on her feet 12 hour shifts in . Recently on Cipro x 2 for UTI  Recent illness:  No  Previous similar problem: No  Previous evaluation:  No  Precipitating or alleviating factors:  Aggravating factors include: any pressure on it   Therapies tried and outcome: heat and ice          Review of Systems  Constitutional, HEENT, cardiovascular, pulmonary, gi and gu systems are negative, except as otherwise noted.     "  Objective    /88   Pulse 64   Temp 98.1  F (36.7  C) (Tympanic)   Resp 16   Ht 1.651 m (5' 5\")   Wt 128.5 kg (283 lb 3.2 oz)   LMP 08/01/2016 (Exact Date)   SpO2 100%   BMI 47.13 kg/m    Body mass index is 47.13 kg/m .  Physical Exam   GENERAL: alert and no distress  MS: LLE exam shows ROM of all joints is normal and decreased strength, swelling and tenderness over achilles tendon, no bruising or loss of mobility   NEURO: Normal strength and tone, mentation intact and speech normal            Signed Electronically by: KINGA Pearce CNP    "

## 2024-08-12 DIAGNOSIS — I26.99 RECURRENT PULMONARY EMBOLI (H): ICD-10-CM

## 2024-08-13 NOTE — TELEPHONE ENCOUNTER
Requested Prescriptions   Pending Prescriptions Disp Refills    XARELTO ANTICOAGULANT 20 MG TABS tablet [Pharmacy Med Name: XARELTO 20 MG TABLET] 90 tablet 3     Sig: TAKE 1 TABLET BY MOUTH DAILY WITH DINNER       Anticoagulant Agents Failed - 8/12/2024  7:41 PM        Failed - Creatinine Clearance greater than 50 ml/min on file in past 3 mos     No lab results found.          Failed - Serum creatinine less than or equal to 1.4 on file in past 3 mos     Recent Labs   Lab Test 04/17/24  1424   CR 0.75                 Passed - Normal Platelets on file in past 12 months     Recent Labs   Lab Test 03/20/24  1414                  Passed - Medication is active on med list        Passed - GFR on file in the past 12 months and most recent GFR is normal        Passed - Recent (6 mo) or future (90 days) visit within the authorizing provider's specialty        Passed - Medication indicated for associated diagnosis     Medication is associated with one or more of the following diagnoses:  Atrial fibrillation  Deep venous thrombosis  Heparin-induced thrombocytopenia  Pulmonary embolism  Venous thromboembolism          Passed - Patient is 18 years of age or older        Passed - No active pregnancy on record        Passed - No positive pregnancy test within past 12 months

## 2024-08-14 RX ORDER — RIVAROXABAN 20 MG/1
20 TABLET, FILM COATED ORAL
Qty: 90 TABLET | Refills: 2 | Status: SHIPPED | OUTPATIENT
Start: 2024-08-14

## 2024-09-09 ENCOUNTER — TRANSFERRED RECORDS (OUTPATIENT)
Dept: HEALTH INFORMATION MANAGEMENT | Facility: CLINIC | Age: 44
End: 2024-09-09
Payer: COMMERCIAL

## 2025-01-08 ENCOUNTER — PATIENT OUTREACH (OUTPATIENT)
Dept: CARE COORDINATION | Facility: CLINIC | Age: 45
End: 2025-01-08
Payer: COMMERCIAL

## 2025-01-11 ENCOUNTER — HEALTH MAINTENANCE LETTER (OUTPATIENT)
Age: 45
End: 2025-01-11

## 2025-02-18 ENCOUNTER — E-VISIT (OUTPATIENT)
Dept: URGENT CARE | Facility: CLINIC | Age: 45
End: 2025-02-18
Payer: COMMERCIAL

## 2025-02-18 DIAGNOSIS — N39.0 ACUTE UTI (URINARY TRACT INFECTION): Primary | ICD-10-CM

## 2025-02-18 RX ORDER — NITROFURANTOIN 25; 75 MG/1; MG/1
100 CAPSULE ORAL 2 TIMES DAILY
Qty: 10 CAPSULE | Refills: 0 | Status: SHIPPED | OUTPATIENT
Start: 2025-02-18 | End: 2025-02-23

## 2025-02-18 NOTE — PATIENT INSTRUCTIONS
Dear Summer Smith    After reviewing your responses, I've been able to diagnose you with a urinary tract infection, which is a common infection of the bladder with bacteria.  This is not a sexually transmitted infection, though urinating immediately after intercourse can help prevent infections.  Drinking lots of fluids is also helpful to clear your current infection and prevent the next one.      I have sent a prescription for antibiotics to your pharmacy to treat this infection.    It is important that you take all of your prescribed medication even if your symptoms are improving after a few doses.  Taking all of your medicine helps prevent the symptoms from returning.     If your symptoms worsen, you develop pain in your back or stomach, develop fevers, or are not improving in 5 days, please contact your primary care provider for an appointment or visit any of our convenient Walk-in or Urgent Care Centers to be seen, which can be found on our website here.    Thanks again for choosing us as your health care partner,    Sp Garcia MD, MD  Urinary Tract Infection (UTI) in Women: Care Instructions  Overview     A urinary tract infection (UTI) is an infection caused by bacteria. It can happen anywhere in the urinary tract. A UTI can happen in the:  Kidneys.  Ureters, the tubes that connect the kidneys to the bladder.  Bladder.  Urethra, where the urine comes out.  Most UTIs are bladder infections. They often cause pain or burning when you urinate.  Most UTIs can be cured with antibiotics. If you are prescribed antibiotics, be sure to complete your treatment so that the infection does not get worse.  Follow-up care is a key part of your treatment and safety. Be sure to make and go to all appointments, and call your doctor if you are having problems. It's also a good idea to know your test results and keep a list of the medicines you take.  How can you care for yourself at home?  Take your  antibiotics as directed. Do not stop taking them just because you feel better. You need to take the full course of antibiotics.  Drink extra water and other fluids for the next day or two. This will help make the urine less concentrated and help wash out the bacteria that are causing the infection. (If you have kidney, heart, or liver disease and have to limit fluids, talk with your doctor before you increase the amount of fluids you drink.)  Avoid drinks that are carbonated or have caffeine. They can irritate the bladder.  Urinate often. Try to empty your bladder each time.  To relieve pain, take a hot bath or lay a heating pad set on low over your lower belly or genital area. Never go to sleep with a heating pad in place.  To prevent UTIs  Drink plenty of water each day. This helps you urinate often, which clears bacteria from your system. (If you have kidney, heart, or liver disease and have to limit fluids, talk with your doctor before you increase the amount of fluids you drink.)  Urinate when you need to.  If you are sexually active, urinate right after you have sex.  Change sanitary pads often.  Avoid douches, bubble baths, feminine hygiene sprays, and other feminine hygiene products that have deodorants.  After going to the bathroom, wipe from front to back.  When should you call for help?   Call your doctor now or seek immediate medical care if:    You have new or worse fever, chills, nausea, or vomiting.     You have new pain in your back just below your rib cage. This is called flank pain.     There is new blood or pus in your urine.     You have any problems with your antibiotic medicine.   Watch closely for changes in your health, and be sure to contact your doctor if:    You are not getting better after taking an antibiotic for 2 days.     Your symptoms go away but then come back.   Where can you learn more?  Go to https://www.healthwise.net/patiented  Enter K848 in the search box to learn more about  "\"Urinary Tract Infection (UTI) in Women: Care Instructions.\"  Current as of: April 30, 2024  Content Version: 14.3    2024 [employer].   Care instructions adapted under license by your healthcare professional. If you have questions about a medical condition or this instruction, always ask your healthcare professional. [employer] disclaims any warranty or liability for your use of this information.    "

## 2025-03-10 ENCOUNTER — PATIENT OUTREACH (OUTPATIENT)
Dept: CARE COORDINATION | Facility: CLINIC | Age: 45
End: 2025-03-10
Payer: COMMERCIAL

## 2025-04-28 ENCOUNTER — MYC MEDICAL ADVICE (OUTPATIENT)
Dept: FAMILY MEDICINE | Facility: CLINIC | Age: 45
End: 2025-04-28
Payer: COMMERCIAL

## 2025-04-30 ENCOUNTER — ANESTHESIA (OUTPATIENT)
Dept: SURGERY | Facility: CLINIC | Age: 45
End: 2025-04-30
Payer: COMMERCIAL

## 2025-04-30 ENCOUNTER — ANESTHESIA EVENT (OUTPATIENT)
Dept: SURGERY | Facility: CLINIC | Age: 45
End: 2025-04-30
Payer: COMMERCIAL

## 2025-04-30 ENCOUNTER — HOSPITAL ENCOUNTER (EMERGENCY)
Facility: CLINIC | Age: 45
Discharge: HOME | End: 2025-04-30
Attending: EMERGENCY MEDICINE
Payer: COMMERCIAL

## 2025-04-30 ENCOUNTER — APPOINTMENT (OUTPATIENT)
Dept: CT IMAGING | Facility: CLINIC | Age: 45
End: 2025-04-30
Attending: EMERGENCY MEDICINE
Payer: COMMERCIAL

## 2025-04-30 ENCOUNTER — HOSPITAL ENCOUNTER (OUTPATIENT)
Facility: CLINIC | Age: 45
End: 2025-04-30
Attending: STUDENT IN AN ORGANIZED HEALTH CARE EDUCATION/TRAINING PROGRAM | Admitting: STUDENT IN AN ORGANIZED HEALTH CARE EDUCATION/TRAINING PROGRAM
Payer: COMMERCIAL

## 2025-04-30 VITALS
DIASTOLIC BLOOD PRESSURE: 87 MMHG | HEIGHT: 65 IN | BODY MASS INDEX: 44.65 KG/M2 | SYSTOLIC BLOOD PRESSURE: 149 MMHG | HEART RATE: 58 BPM | OXYGEN SATURATION: 93 % | WEIGHT: 268 LBS | RESPIRATION RATE: 16 BRPM | TEMPERATURE: 98.1 F

## 2025-04-30 DIAGNOSIS — K35.30 ACUTE APPENDICITIS WITH LOCALIZED PERITONITIS, WITHOUT PERFORATION, ABSCESS, OR GANGRENE: ICD-10-CM

## 2025-04-30 LAB
ANION GAP SERPL CALCULATED.3IONS-SCNC: 8 MMOL/L (ref 7–15)
BASOPHILS # BLD AUTO: 0.1 10E3/UL (ref 0–0.2)
BASOPHILS NFR BLD AUTO: 1 %
BUN SERPL-MCNC: 11 MG/DL (ref 6–20)
CALCIUM SERPL-MCNC: 9.3 MG/DL (ref 8.8–10.4)
CHLORIDE SERPL-SCNC: 105 MMOL/L (ref 98–107)
CREAT SERPL-MCNC: 0.76 MG/DL (ref 0.51–0.95)
EGFRCR SERPLBLD CKD-EPI 2021: >90 ML/MIN/1.73M2
EOSINOPHIL # BLD AUTO: 0.1 10E3/UL (ref 0–0.7)
EOSINOPHIL NFR BLD AUTO: 2 %
ERYTHROCYTE [DISTWIDTH] IN BLOOD BY AUTOMATED COUNT: 11.9 % (ref 10–15)
GLUCOSE SERPL-MCNC: 97 MG/DL (ref 70–99)
HCO3 SERPL-SCNC: 25 MMOL/L (ref 22–29)
HCT VFR BLD AUTO: 42.8 % (ref 35–47)
HGB BLD-MCNC: 14 G/DL (ref 11.7–15.7)
IMM GRANULOCYTES # BLD: 0 10E3/UL
IMM GRANULOCYTES NFR BLD: 1 %
LYMPHOCYTES # BLD AUTO: 2.3 10E3/UL (ref 0.8–5.3)
LYMPHOCYTES NFR BLD AUTO: 33 %
MCH RBC QN AUTO: 28.1 PG (ref 26.5–33)
MCHC RBC AUTO-ENTMCNC: 32.7 G/DL (ref 31.5–36.5)
MCV RBC AUTO: 86 FL (ref 78–100)
MONOCYTES # BLD AUTO: 0.7 10E3/UL (ref 0–1.3)
MONOCYTES NFR BLD AUTO: 10 %
NEUTROPHILS # BLD AUTO: 3.7 10E3/UL (ref 1.6–8.3)
NEUTROPHILS NFR BLD AUTO: 53 %
NRBC # BLD AUTO: 0 10E3/UL
NRBC BLD AUTO-RTO: 0 /100
PLATELET # BLD AUTO: 323 10E3/UL (ref 150–450)
POTASSIUM SERPL-SCNC: 4.4 MMOL/L (ref 3.4–5.3)
RBC # BLD AUTO: 4.98 10E6/UL (ref 3.8–5.2)
SODIUM SERPL-SCNC: 138 MMOL/L (ref 135–145)
WBC # BLD AUTO: 7 10E3/UL (ref 4–11)

## 2025-04-30 PROCEDURE — 250N000009 HC RX 250

## 2025-04-30 PROCEDURE — 370N000017 HC ANESTHESIA TECHNICAL FEE, PER MIN: Performed by: STUDENT IN AN ORGANIZED HEALTH CARE EDUCATION/TRAINING PROGRAM

## 2025-04-30 PROCEDURE — 250N000011 HC RX IP 250 OP 636: Performed by: STUDENT IN AN ORGANIZED HEALTH CARE EDUCATION/TRAINING PROGRAM

## 2025-04-30 PROCEDURE — 74177 CT ABD & PELVIS W/CONTRAST: CPT

## 2025-04-30 PROCEDURE — 99285 EMERGENCY DEPT VISIT HI MDM: CPT | Performed by: EMERGENCY MEDICINE

## 2025-04-30 PROCEDURE — 250N000025 HC SEVOFLURANE, PER MIN: Performed by: STUDENT IN AN ORGANIZED HEALTH CARE EDUCATION/TRAINING PROGRAM

## 2025-04-30 PROCEDURE — 85025 COMPLETE CBC W/AUTO DIFF WBC: CPT | Performed by: EMERGENCY MEDICINE

## 2025-04-30 PROCEDURE — 250N000013 HC RX MED GY IP 250 OP 250 PS 637

## 2025-04-30 PROCEDURE — 99285 EMERGENCY DEPT VISIT HI MDM: CPT | Mod: 25 | Performed by: EMERGENCY MEDICINE

## 2025-04-30 PROCEDURE — 710N000012 HC RECOVERY PHASE 2, PER MINUTE: Performed by: STUDENT IN AN ORGANIZED HEALTH CARE EDUCATION/TRAINING PROGRAM

## 2025-04-30 PROCEDURE — 88304 TISSUE EXAM BY PATHOLOGIST: CPT | Mod: TC | Performed by: STUDENT IN AN ORGANIZED HEALTH CARE EDUCATION/TRAINING PROGRAM

## 2025-04-30 PROCEDURE — 250N000013 HC RX MED GY IP 250 OP 250 PS 637: Performed by: STUDENT IN AN ORGANIZED HEALTH CARE EDUCATION/TRAINING PROGRAM

## 2025-04-30 PROCEDURE — 250N000011 HC RX IP 250 OP 636

## 2025-04-30 PROCEDURE — 96374 THER/PROPH/DIAG INJ IV PUSH: CPT | Mod: 59 | Performed by: EMERGENCY MEDICINE

## 2025-04-30 PROCEDURE — 99204 OFFICE O/P NEW MOD 45 MIN: CPT | Mod: 57 | Performed by: STUDENT IN AN ORGANIZED HEALTH CARE EDUCATION/TRAINING PROGRAM

## 2025-04-30 PROCEDURE — 250N000011 HC RX IP 250 OP 636: Mod: JZ | Performed by: EMERGENCY MEDICINE

## 2025-04-30 PROCEDURE — 96375 TX/PRO/DX INJ NEW DRUG ADDON: CPT | Performed by: EMERGENCY MEDICINE

## 2025-04-30 PROCEDURE — 272N000001 HC OR GENERAL SUPPLY STERILE: Performed by: STUDENT IN AN ORGANIZED HEALTH CARE EDUCATION/TRAINING PROGRAM

## 2025-04-30 PROCEDURE — 250N000011 HC RX IP 250 OP 636: Performed by: EMERGENCY MEDICINE

## 2025-04-30 PROCEDURE — 80048 BASIC METABOLIC PNL TOTAL CA: CPT | Performed by: EMERGENCY MEDICINE

## 2025-04-30 PROCEDURE — 258N000003 HC RX IP 258 OP 636: Performed by: EMERGENCY MEDICINE

## 2025-04-30 PROCEDURE — 258N000003 HC RX IP 258 OP 636

## 2025-04-30 PROCEDURE — 44970 LAPAROSCOPY APPENDECTOMY: CPT | Performed by: STUDENT IN AN ORGANIZED HEALTH CARE EDUCATION/TRAINING PROGRAM

## 2025-04-30 PROCEDURE — 250N000011 HC RX IP 250 OP 636: Performed by: NURSE ANESTHETIST, CERTIFIED REGISTERED

## 2025-04-30 PROCEDURE — 96376 TX/PRO/DX INJ SAME DRUG ADON: CPT | Performed by: EMERGENCY MEDICINE

## 2025-04-30 PROCEDURE — 999N000141 HC STATISTIC PRE-PROCEDURE NURSING ASSESSMENT: Performed by: STUDENT IN AN ORGANIZED HEALTH CARE EDUCATION/TRAINING PROGRAM

## 2025-04-30 PROCEDURE — 250N000009 HC RX 250: Performed by: EMERGENCY MEDICINE

## 2025-04-30 PROCEDURE — 360N000076 HC SURGERY LEVEL 3, PER MIN: Performed by: STUDENT IN AN ORGANIZED HEALTH CARE EDUCATION/TRAINING PROGRAM

## 2025-04-30 PROCEDURE — 271N000001 HC OR GENERAL SUPPLY NON-STERILE: Performed by: STUDENT IN AN ORGANIZED HEALTH CARE EDUCATION/TRAINING PROGRAM

## 2025-04-30 PROCEDURE — 710N000009 HC RECOVERY PHASE 1, LEVEL 1, PER MIN: Performed by: STUDENT IN AN ORGANIZED HEALTH CARE EDUCATION/TRAINING PROGRAM

## 2025-04-30 PROCEDURE — 36415 COLL VENOUS BLD VENIPUNCTURE: CPT | Performed by: EMERGENCY MEDICINE

## 2025-04-30 RX ORDER — METHOCARBAMOL 500 MG/1
500 TABLET, FILM COATED ORAL
Status: COMPLETED | OUTPATIENT
Start: 2025-04-30 | End: 2025-04-30

## 2025-04-30 RX ORDER — DEXAMETHASONE SODIUM PHOSPHATE 4 MG/ML
INJECTION, SOLUTION INTRA-ARTICULAR; INTRALESIONAL; INTRAMUSCULAR; INTRAVENOUS; SOFT TISSUE PRN
Status: DISCONTINUED | OUTPATIENT
Start: 2025-04-30 | End: 2025-04-30

## 2025-04-30 RX ORDER — DIPHENHYDRAMINE HYDROCHLORIDE 50 MG/ML
12.5 INJECTION, SOLUTION INTRAMUSCULAR; INTRAVENOUS ONCE
Status: COMPLETED | OUTPATIENT
Start: 2025-04-30 | End: 2025-04-30

## 2025-04-30 RX ORDER — HEPARIN SODIUM 5000 [USP'U]/.5ML
5000 INJECTION, SOLUTION INTRAVENOUS; SUBCUTANEOUS
Status: DISCONTINUED | OUTPATIENT
Start: 2025-04-30 | End: 2025-04-30 | Stop reason: HOSPADM

## 2025-04-30 RX ORDER — NALOXONE HYDROCHLORIDE 0.4 MG/ML
0.4 INJECTION, SOLUTION INTRAMUSCULAR; INTRAVENOUS; SUBCUTANEOUS
Status: DISCONTINUED | OUTPATIENT
Start: 2025-04-30 | End: 2025-04-30 | Stop reason: HOSPADM

## 2025-04-30 RX ORDER — ACETAMINOPHEN 325 MG/1
650 TABLET ORAL EVERY 4 HOURS PRN
Qty: 50 TABLET | Refills: 0 | Status: SHIPPED | OUTPATIENT
Start: 2025-04-30

## 2025-04-30 RX ORDER — PROPOFOL 10 MG/ML
INJECTION, EMULSION INTRAVENOUS PRN
Status: DISCONTINUED | OUTPATIENT
Start: 2025-04-30 | End: 2025-04-30

## 2025-04-30 RX ORDER — CEFAZOLIN SODIUM/WATER 3 G/30 ML
3 SYRINGE (ML) INTRAVENOUS
Status: DISCONTINUED | OUTPATIENT
Start: 2025-04-30 | End: 2025-04-30 | Stop reason: HOSPADM

## 2025-04-30 RX ORDER — NALOXONE HYDROCHLORIDE 0.4 MG/ML
0.2 INJECTION, SOLUTION INTRAMUSCULAR; INTRAVENOUS; SUBCUTANEOUS
Status: DISCONTINUED | OUTPATIENT
Start: 2025-04-30 | End: 2025-04-30 | Stop reason: HOSPADM

## 2025-04-30 RX ORDER — HEPARIN SODIUM 1000 [USP'U]/ML
INJECTION, SOLUTION INTRAVENOUS; SUBCUTANEOUS PRN
Status: DISCONTINUED | OUTPATIENT
Start: 2025-04-30 | End: 2025-04-30

## 2025-04-30 RX ORDER — HYDRALAZINE HYDROCHLORIDE 20 MG/ML
INJECTION INTRAMUSCULAR; INTRAVENOUS PRN
Status: DISCONTINUED | OUTPATIENT
Start: 2025-04-30 | End: 2025-04-30

## 2025-04-30 RX ORDER — OXYCODONE HYDROCHLORIDE 5 MG/1
5 TABLET ORAL
Status: DISCONTINUED | OUTPATIENT
Start: 2025-04-30 | End: 2025-04-30 | Stop reason: HOSPADM

## 2025-04-30 RX ORDER — DEXAMETHASONE SODIUM PHOSPHATE 4 MG/ML
4 INJECTION, SOLUTION INTRA-ARTICULAR; INTRALESIONAL; INTRAMUSCULAR; INTRAVENOUS; SOFT TISSUE
Status: DISCONTINUED | OUTPATIENT
Start: 2025-04-30 | End: 2025-04-30 | Stop reason: HOSPADM

## 2025-04-30 RX ORDER — CEFAZOLIN SODIUM/WATER 3 G/30 ML
3 SYRINGE (ML) INTRAVENOUS SEE ADMIN INSTRUCTIONS
Status: DISCONTINUED | OUTPATIENT
Start: 2025-04-30 | End: 2025-04-30 | Stop reason: HOSPADM

## 2025-04-30 RX ORDER — FENTANYL CITRATE 50 UG/ML
50 INJECTION, SOLUTION INTRAMUSCULAR; INTRAVENOUS EVERY 5 MIN PRN
Status: DISCONTINUED | OUTPATIENT
Start: 2025-04-30 | End: 2025-04-30 | Stop reason: HOSPADM

## 2025-04-30 RX ORDER — IBUPROFEN 600 MG/1
600 TABLET, FILM COATED ORAL EVERY 6 HOURS PRN
Qty: 30 TABLET | Refills: 0 | Status: SHIPPED | OUTPATIENT
Start: 2025-04-30

## 2025-04-30 RX ORDER — CIPROFLOXACIN 2 MG/ML
400 INJECTION, SOLUTION INTRAVENOUS ONCE
Status: COMPLETED | OUTPATIENT
Start: 2025-04-30 | End: 2025-04-30

## 2025-04-30 RX ORDER — ONDANSETRON 2 MG/ML
INJECTION INTRAMUSCULAR; INTRAVENOUS PRN
Status: DISCONTINUED | OUTPATIENT
Start: 2025-04-30 | End: 2025-04-30

## 2025-04-30 RX ORDER — IOPAMIDOL 755 MG/ML
131 INJECTION, SOLUTION INTRAVASCULAR ONCE
Status: COMPLETED | OUTPATIENT
Start: 2025-04-30 | End: 2025-04-30

## 2025-04-30 RX ORDER — FENTANYL CITRATE 50 UG/ML
25 INJECTION, SOLUTION INTRAMUSCULAR; INTRAVENOUS EVERY 5 MIN PRN
Status: DISCONTINUED | OUTPATIENT
Start: 2025-04-30 | End: 2025-04-30 | Stop reason: HOSPADM

## 2025-04-30 RX ORDER — SODIUM CHLORIDE, SODIUM LACTATE, POTASSIUM CHLORIDE, CALCIUM CHLORIDE 600; 310; 30; 20 MG/100ML; MG/100ML; MG/100ML; MG/100ML
INJECTION, SOLUTION INTRAVENOUS CONTINUOUS PRN
Status: DISCONTINUED | OUTPATIENT
Start: 2025-04-30 | End: 2025-04-30

## 2025-04-30 RX ORDER — ONDANSETRON 4 MG/1
4 TABLET, ORALLY DISINTEGRATING ORAL EVERY 30 MIN PRN
Status: DISCONTINUED | OUTPATIENT
Start: 2025-04-30 | End: 2025-04-30 | Stop reason: HOSPADM

## 2025-04-30 RX ORDER — KETAMINE HYDROCHLORIDE 10 MG/ML
INJECTION INTRAMUSCULAR; INTRAVENOUS PRN
Status: DISCONTINUED | OUTPATIENT
Start: 2025-04-30 | End: 2025-04-30

## 2025-04-30 RX ORDER — HYDROMORPHONE HCL IN WATER/PF 6 MG/30 ML
0.2 PATIENT CONTROLLED ANALGESIA SYRINGE INTRAVENOUS EVERY 5 MIN PRN
Status: DISCONTINUED | OUTPATIENT
Start: 2025-04-30 | End: 2025-04-30 | Stop reason: HOSPADM

## 2025-04-30 RX ORDER — ACETAMINOPHEN 325 MG/1
975 TABLET ORAL ONCE
Status: COMPLETED | OUTPATIENT
Start: 2025-04-30 | End: 2025-04-30

## 2025-04-30 RX ORDER — AMOXICILLIN 250 MG
1-2 CAPSULE ORAL 2 TIMES DAILY
Qty: 30 TABLET | Refills: 0 | Status: SHIPPED | OUTPATIENT
Start: 2025-04-30

## 2025-04-30 RX ORDER — ONDANSETRON 2 MG/ML
4 INJECTION INTRAMUSCULAR; INTRAVENOUS ONCE
Status: COMPLETED | OUTPATIENT
Start: 2025-04-30 | End: 2025-04-30

## 2025-04-30 RX ORDER — OXYCODONE HYDROCHLORIDE 5 MG/1
5-10 TABLET ORAL EVERY 4 HOURS PRN
Qty: 10 TABLET | Refills: 0 | Status: SHIPPED | OUTPATIENT
Start: 2025-04-30

## 2025-04-30 RX ORDER — ONDANSETRON 2 MG/ML
4 INJECTION INTRAMUSCULAR; INTRAVENOUS EVERY 30 MIN PRN
Status: DISCONTINUED | OUTPATIENT
Start: 2025-04-30 | End: 2025-04-30 | Stop reason: HOSPADM

## 2025-04-30 RX ORDER — NALOXONE HYDROCHLORIDE 0.4 MG/ML
0.1 INJECTION, SOLUTION INTRAMUSCULAR; INTRAVENOUS; SUBCUTANEOUS
Status: DISCONTINUED | OUTPATIENT
Start: 2025-04-30 | End: 2025-04-30 | Stop reason: HOSPADM

## 2025-04-30 RX ORDER — HYDROMORPHONE HYDROCHLORIDE 1 MG/ML
0.5 INJECTION, SOLUTION INTRAMUSCULAR; INTRAVENOUS; SUBCUTANEOUS
Status: DISCONTINUED | OUTPATIENT
Start: 2025-04-30 | End: 2025-04-30 | Stop reason: HOSPADM

## 2025-04-30 RX ORDER — HYDROMORPHONE HCL IN WATER/PF 6 MG/30 ML
0.4 PATIENT CONTROLLED ANALGESIA SYRINGE INTRAVENOUS EVERY 5 MIN PRN
Status: DISCONTINUED | OUTPATIENT
Start: 2025-04-30 | End: 2025-04-30 | Stop reason: HOSPADM

## 2025-04-30 RX ORDER — ONDANSETRON 2 MG/ML
4 INJECTION INTRAMUSCULAR; INTRAVENOUS
Status: COMPLETED | OUTPATIENT
Start: 2025-04-30 | End: 2025-04-30

## 2025-04-30 RX ORDER — FENTANYL CITRATE 50 UG/ML
INJECTION, SOLUTION INTRAMUSCULAR; INTRAVENOUS PRN
Status: DISCONTINUED | OUTPATIENT
Start: 2025-04-30 | End: 2025-04-30

## 2025-04-30 RX ORDER — ACETAMINOPHEN 325 MG/1
650 TABLET ORAL
Status: DISCONTINUED | OUTPATIENT
Start: 2025-04-30 | End: 2025-04-30 | Stop reason: HOSPADM

## 2025-04-30 RX ORDER — SODIUM CHLORIDE, SODIUM LACTATE, POTASSIUM CHLORIDE, CALCIUM CHLORIDE 600; 310; 30; 20 MG/100ML; MG/100ML; MG/100ML; MG/100ML
INJECTION, SOLUTION INTRAVENOUS CONTINUOUS
Status: DISCONTINUED | OUTPATIENT
Start: 2025-04-30 | End: 2025-04-30 | Stop reason: HOSPADM

## 2025-04-30 RX ORDER — METRONIDAZOLE 500 MG/100ML
500 INJECTION, SOLUTION INTRAVENOUS ONCE
Status: DISCONTINUED | OUTPATIENT
Start: 2025-04-30 | End: 2025-04-30 | Stop reason: HOSPADM

## 2025-04-30 RX ADMIN — HYDROMORPHONE HYDROCHLORIDE 0.5 MG: 1 INJECTION, SOLUTION INTRAMUSCULAR; INTRAVENOUS; SUBCUTANEOUS at 16:29

## 2025-04-30 RX ADMIN — HYDROMORPHONE HYDROCHLORIDE 0.5 MG: 1 INJECTION, SOLUTION INTRAMUSCULAR; INTRAVENOUS; SUBCUTANEOUS at 14:45

## 2025-04-30 RX ADMIN — FENTANYL CITRATE 100 MCG: 50 INJECTION INTRAMUSCULAR; INTRAVENOUS at 17:17

## 2025-04-30 RX ADMIN — PROCHLORPERAZINE EDISYLATE 5 MG: 5 INJECTION INTRAMUSCULAR; INTRAVENOUS at 20:11

## 2025-04-30 RX ADMIN — PROPOFOL 250 MG: 10 INJECTION, EMULSION INTRAVENOUS at 17:17

## 2025-04-30 RX ADMIN — METHOCARBAMOL 500 MG: 500 TABLET ORAL at 19:10

## 2025-04-30 RX ADMIN — ONDANSETRON 4 MG: 2 INJECTION, SOLUTION INTRAMUSCULAR; INTRAVENOUS at 14:45

## 2025-04-30 RX ADMIN — CIPROFLOXACIN 400 MG: 2 INJECTION, SOLUTION INTRAVENOUS at 16:26

## 2025-04-30 RX ADMIN — KETAMINE HYDROCHLORIDE 20 MG: 10 INJECTION INTRAMUSCULAR; INTRAVENOUS at 17:43

## 2025-04-30 RX ADMIN — FENTANYL CITRATE 100 MCG: 50 INJECTION INTRAMUSCULAR; INTRAVENOUS at 17:43

## 2025-04-30 RX ADMIN — DIPHENHYDRAMINE HYDROCHLORIDE 12.5 MG: 50 INJECTION INTRAMUSCULAR; INTRAVENOUS at 20:06

## 2025-04-30 RX ADMIN — HYDRALAZINE HYDROCHLORIDE 5 MG: 20 INJECTION INTRAMUSCULAR; INTRAVENOUS at 17:52

## 2025-04-30 RX ADMIN — KETAMINE HYDROCHLORIDE 20 MG: 10 INJECTION INTRAMUSCULAR; INTRAVENOUS at 17:59

## 2025-04-30 RX ADMIN — FENTANYL CITRATE 50 MCG: 50 INJECTION INTRAMUSCULAR; INTRAVENOUS at 17:29

## 2025-04-30 RX ADMIN — HEPARIN SODIUM 5000 UNITS: 1000 INJECTION INTRAVENOUS; SUBCUTANEOUS at 17:08

## 2025-04-30 RX ADMIN — ACETAMINOPHEN 975 MG: 325 TABLET, FILM COATED ORAL at 16:55

## 2025-04-30 RX ADMIN — ONDANSETRON 4 MG: 2 INJECTION INTRAMUSCULAR; INTRAVENOUS at 17:51

## 2025-04-30 RX ADMIN — DEXAMETHASONE SODIUM PHOSPHATE 4 MG: 4 INJECTION, SOLUTION INTRA-ARTICULAR; INTRALESIONAL; INTRAMUSCULAR; INTRAVENOUS; SOFT TISSUE at 17:51

## 2025-04-30 RX ADMIN — Medication 60 MG: at 17:17

## 2025-04-30 RX ADMIN — Medication 20 MG: at 17:45

## 2025-04-30 RX ADMIN — ONDANSETRON 4 MG: 2 INJECTION INTRAMUSCULAR; INTRAVENOUS at 19:30

## 2025-04-30 RX ADMIN — SODIUM CHLORIDE 73 ML: 9 INJECTION, SOLUTION INTRAVENOUS at 15:15

## 2025-04-30 RX ADMIN — KETAMINE HYDROCHLORIDE 10 MG: 10 INJECTION INTRAMUSCULAR; INTRAVENOUS at 17:53

## 2025-04-30 RX ADMIN — IOPAMIDOL 131 ML: 755 INJECTION, SOLUTION INTRAVENOUS at 15:15

## 2025-04-30 RX ADMIN — SODIUM CHLORIDE, SODIUM LACTATE, POTASSIUM CHLORIDE, AND CALCIUM CHLORIDE 1000 ML: .6; .31; .03; .02 INJECTION, SOLUTION INTRAVENOUS at 16:28

## 2025-04-30 RX ADMIN — MIDAZOLAM 2 MG: 1 INJECTION INTRAMUSCULAR; INTRAVENOUS at 17:08

## 2025-04-30 RX ADMIN — SODIUM CHLORIDE, SODIUM LACTATE, POTASSIUM CHLORIDE, AND CALCIUM CHLORIDE: .6; .31; .03; .02 INJECTION, SOLUTION INTRAVENOUS at 17:08

## 2025-04-30 RX ADMIN — Medication 200 MG: at 18:00

## 2025-04-30 ASSESSMENT — ACTIVITIES OF DAILY LIVING (ADL)
ADLS_ACUITY_SCORE: 41

## 2025-04-30 ASSESSMENT — COLUMBIA-SUICIDE SEVERITY RATING SCALE - C-SSRS
6. HAVE YOU EVER DONE ANYTHING, STARTED TO DO ANYTHING, OR PREPARED TO DO ANYTHING TO END YOUR LIFE?: NO
2. HAVE YOU ACTUALLY HAD ANY THOUGHTS OF KILLING YOURSELF IN THE PAST MONTH?: NO
1. IN THE PAST MONTH, HAVE YOU WISHED YOU WERE DEAD OR WISHED YOU COULD GO TO SLEEP AND NOT WAKE UP?: NO

## 2025-04-30 NOTE — OP NOTE
Procedure Date: April 30, 2025    PREOPERATIVE DIAGNOSIS: acute appendicitis  POSTOPERATIVE DIAGNOSIS: same    PROCEDURE:  1. Laparoscopic Appendectomy    ATTENDING SURGEON and Assistants:   Surgeon(s):  Fili Schulte MD    ESTIMATED BLOOD LOSS: 5 mL    FINDINGS:   1. The appendix appeared inflamed; there was not evidence of a perforation. There was not evidence of abscess.    INDICATIONS: Ms. Summer Smith is a 44 year old female who presents with clinical features consistent with acute appendicitis. A discussion was held with the patient regarding indications, risks, benefits, and alternatives to surgery (including, but not limited to bleeding, infection, intra-abdominal injury, conversion from laparoscopy to open surgery, nontherapeutic operation, potential alternate etiology of presenting symptoms, abscess, potential need for additional treatments, and the cardiopulmonary risks associated with surgery and anesthesia). The patient's questions were addressed, and she consented to laparoscopic (versus open) appendectomy.     PROCEDURE: The patient was brought to the operating room, and placed in the supine position on the operating table. Lower extremity sequential compression devices were placed and functioning prior to delivery of general anesthesia, which included endotracheal intubation. A urinary catheter was not placed, as the patient urinated just prior to arrival to the operating room. The abdomen was then prepped and draped in the usual, sterile fashion. A procedural time out was performed.     A 5-mm Visiport was placed in the LUQ. Entrance into the abdomen was achieved and bowel visualized. Pneumoperitoneum was established, and a 30-degree 5-mm laparoscope was advanced into the abdomen. The abdomen was briefly surveyed, and there was evidence of focal peritonitis in the RLQ with a small amount of murky fluid in the RLQ, but no obvious signs of appendiceal perforation. One 5-mm port was placed  under direct visualization by laparoscope in the LLQ and a third 5-mm port was placed inferior to the first.      The patient was placed in Trendelenburg position, allowing the abdominal contents to shift cephalad. This allowed for identification of the appendix. It appeared inflamed, indurated, but was not obviously perforated. The appendix was elevated and the mesoappendix was dissected free with the ligasure. Dissection was completed to the base of the appendix at the level of the cecum, and the appendix was secured with 2 PDS endoloops proximal and one distal prior to division. The appendix was placed in an endocatch and retrieved via the left middle port site.     The field was cleared to assess for hemostasis, which was assured. An instrument count, including laparotomy pads, sponges and needles was performed and found to be correct. Skin edges were re-approximated with 4-0 Monocryl suture, and the wounds were all cleansed and dried prior to application of sterile glue. The patient tolerated the procedure well, was extubated without incident, and transferred to the PACU in stable condition.    Fili Schulte MD on 4/30/2025 at 6:11 PM

## 2025-04-30 NOTE — CONSULTS
Surgical Consultation/History and Physical  [facility] General Surgery    Summer is seen in consultation for abdominal pain, at the request of Dr. Slim Marquez    Chief Complaint:  Abdominal pain    History of Present Illness: Summer Smith is a 44 year old female presents with 1 day of abdominal pain.  Pain started yesterday evening and progressed throughout the night.  Pain is associated with worsening cramping in her abdomen which gradually migrated to her right lower quadrant.  She has also had some nausea and decreased appetite but no emesis.  Denies any fevers or chills.  She does report having some diarrhea but no blood in her stool. No dysuria or hematuria    Patient Active Problem List   Diagnosis    Personal history of DVT (deep vein thrombosis)    Recurrent pulmonary emboli (H)    May-Thurner syndrome    Mild intermittent reactive airway disease without complication    Varicose veins of legs    Postoperative hemorrhage involving genitourinary system following genitourinary procedure    Morbid obesity due to excess calories (H)    History of hysterectomy    Pericardial cyst    Left leg swelling    History of pulmonary embolism    Thyroid nodule    Hepatic steatosis    Anticoagulation monitoring, INR range 2-3       Past Medical History:   Diagnosis Date    Asthma     Cardiac murmur     DVT (deep vein thrombosis) in pregnancy 2007    DVT (deep venous thrombosis) (H) 2007    May-Thurner syndrome     Menorrhagia     Pulmonary embolism (H) 2007    no long term anticoagulation, no recurrence    Pulmonary embolism (H) 2007       Past Surgical History:   Procedure Laterality Date    ABLATION SAPHENOUS VEIN W/ RFA      AS LIGATN SHORT SAPHEN      had great saphenous vein removal    DAVINCI HYSTERECTOMY TOTAL      tubes removed, ovaries in place    HYSTERECTOMY  11/03/2016    IR EXTREMITY VENOGRAM LEFT  12/1/2016    OTHER SURGICAL HISTORY  2009    stenting into pelvis    OTHER SURGICAL HISTORY Left      saphenous vein removed    OTHER SURGICAL HISTORY      wisdom teeth extractions    STENT      venous in leg       Family History   Problem Relation Age of Onset    Thyroid Disease Mother         partial lobectomy    Diabetes Father     Hypertension Father     Hyperlipidemia Father     Colon Cancer Maternal Grandfather     Liver Cancer Maternal Grandfather     Cancer Paternal Grandfather     Thyroid Disease Sister     Thyroid Disease Sister     No Known Problems Sister     No Known Problems Sister     Endocrine Disease Daughter         Lucille, has appointment end of July 2016    Autism Spectrum Disorder Daughter     Spina bifida Daughter        Social History     Tobacco Use    Smoking status: Never    Smokeless tobacco: Never   Substance Use Topics    Alcohol use: Yes     Comment: rare        History   Drug Use No       Current Outpatient Medications   Medication Sig Dispense Refill    albuterol (PROAIR HFA/PROVENTIL HFA/VENTOLIN HFA) 108 (90 Base) MCG/ACT inhaler Inhale 2 puffs into the lungs every 4 hours as needed for wheezing 18 g 1    celecoxib (CELEBREX) 100 MG capsule Take 1 capsule (100 mg) by mouth 2 times daily as needed for moderate pain 180 capsule 3    cetirizine (ZYRTEC) 10 MG tablet Take 10 mg by mouth daily as needed for allergies (seasonal)      fluticasone (FLONASE) 50 MCG/ACT nasal spray Spray 1-2 sprays into both nostrils daily as needed for rhinitis or allergies (seasonal)      Levocarnitine 500 MG TABS Take 1,000 mg by mouth daily      metFORMIN (GLUCOPHAGE XR) 500 MG 24 hr tablet Take 1 tablet (500 mg) by mouth daily (with dinner) for 7 days, THEN 2 tablets (1,000 mg) daily (with dinner) for 7 days, THEN 3 tablets (1,500 mg) daily (with dinner). 291 tablet 0    Multiple Vitamin (MULTIVITAMIN ADULT PO) Take 1 tablet by mouth daily      phentermine 30 MG capsule Take 1 capsule (30 mg) by mouth every morning. 90 capsule 1    rivaroxaban ANTICOAGULANT (XARELTO ANTICOAGULANT) 20 MG TABS tablet TAKE  "1 TABLET BY MOUTH DAILY WITH DINNER 90 tablet 2    vitamin D3 (CHOLECALCIFEROL) 2000 units (50 mcg) tablet Take  mcg by mouth daily More sun/Summer months: take 1 tab (50 mcg) daily  Less sun/Winter months: take 2 tabs (100 mcg) daily         Allergies   Allergen Reactions    Phytonadione Anaphylaxis     IV Vitamin K    Sulfa Antibiotics Hives    Vitamin K Anaphylaxis     IV Vitamin K only    Penicillin G Rash    Amoxicillin-Pot Clavulanate Rash       Review of Systems:   10 point ROS negative other than what was listed in HPI    Physical Exam:  BP (!) 165/96   Pulse 74   Temp 98.4  F (36.9  C) (Oral)   Resp 16   Ht 1.651 m (5' 5\")   Wt 121.6 kg (268 lb)   LMP 08/01/2016 (Exact Date)   SpO2 97%   BMI 44.60 kg/m      Constitutional- No acute distress, well nourished, non-toxic  Eyes: Anicteric, no injection.  PERRL  ENT:  Normocephalic, atraumatic, Nose midline, moist mucus membranes  Neck - supple, no LAD  Respiratory- Nonlabored breathing on room air  Cardiovascular - Extremities warm and well perfused.  Abdomen - obese, soft, tender to palpation in right lower quadrant without guarding or generalized peritonitis  Neuro - No focal neuro deficits, Alert and oriented x 3  Psych: Appropriate mood and affect  Musculoskeletal: Symmetric strength.  FROM upper and lower extremities.  Skin: Warm, Dry    Labs and imaging independently reviewed by myself    CBC RESULTS:   Recent Labs   Lab Test 04/30/25  1419   WBC 7.0   RBC 4.98   HGB 14.0   HCT 42.8   MCV 86   MCH 28.1   MCHC 32.7   RDW 11.9          Last Comprehensive Metabolic Panel:  Lab Results   Component Value Date     04/30/2025    POTASSIUM 4.4 04/30/2025    CHLORIDE 105 04/30/2025    CO2 25 04/30/2025    ANIONGAP 8 04/30/2025    GLC 97 04/30/2025    BUN 11.0 04/30/2025    CR 0.76 04/30/2025    GFRESTIMATED >90 04/30/2025    SARAH 9.3 04/30/2025       CT Abdomen Pelvis   FINDINGS:   LOWER CHEST: No significant interval change of the simple " cystic lesion along the right heart border which is been present dating back to at least 5/2018, in keeping with a benign pericardial cyst.     HEPATOBILIARY: Hepatic steatosis without suspicious lesion. Normal gallbladder and bile ducts.     PANCREAS: Normal.     SPLEEN: Normal.     ADRENAL GLANDS: Normal.     KIDNEYS/BLADDER: No significant mass, stone, or hydronephrosis.     BOWEL: The mid to distal appendix is newly dilated to 11 mm with some faint mucosal hyperenhancement. Suspect minimal periappendiceal fat stranding with and thickening of the subjacent peritoneal surface. No evidence of microperforation or   intra-abdominal abscess. Otherwise, the bowel is nondistended. Minimal colonic diverticulosis. No ascites.     LYMPH NODES: No lymphadenopathy.     VASCULATURE: Mild abdominal aorta atherosclerosis without aneurysmal dilation. Left common and external iliac venous stenting.     PELVIC ORGANS: Hysterectomy. Normal ovaries. No suspicious pelvic mass.     MUSCULOSKELETAL: No aggressive osseous lesion. Unchanged well-corticated lesion of the left femoral neck.                                                                      IMPRESSION:   1.  The constellation of findings are most suggestive of an early acute and uncomplicated appendicitis.  2.  Hepatic steatosis      Assessment:  Summer Smith is a 44 year old female with PMHx of prior DVT on xarelto who presented to the emergency department with acute onset abdominal pain.  Clinical and radiographic evidence of acute, uncomplicated appendicitis.  Discussed findings and therapeutic options with the patient, and a laparoscopic appendectomy was recommended.  Discussed with the patient risks and benefits of surgery, including risk of bleeding, infection, risk of damage to nearby structures, potential need to convert to open for safety, risk of DVT, PE, and death.  Patient had the opportunity to ask questions, which were answered to the best of my  ability.  Patient was amenable to the proposed plan and provided informed consent.  We will proceed with a laparoscopic appendectomy today.    Plan:   1. NPO, IV fluids  2. IV antibiotics  3. Proceed to OR for laparoscopic appendectomy today        Fili Schulte MD on 4/30/2025 at 4:05 PM

## 2025-04-30 NOTE — ANESTHESIA POSTPROCEDURE EVALUATION
Patient: Summer Smith    Procedure: Procedure(s):  APPENDECTOMY, LAPAROSCOPIC       Anesthesia Type:  No value filed.    Note:  Disposition: Outpatient   Postop Pain Control: Uneventful            Sign Out: Well controlled pain   PONV: No   Neuro/Psych: Uneventful            Sign Out: Acceptable/Baseline neuro status   Airway/Respiratory: Uneventful            Sign Out: Acceptable/Baseline resp. status   CV/Hemodynamics: Uneventful            Sign Out: Acceptable CV status; No obvious hypovolemia; No obvious fluid overload   Other NRE:    DID A NON-ROUTINE EVENT OCCUR?            Last vitals:  Vitals Value Taken Time   /85 04/30/25 1848   Temp 36.6  C (97.8  F) 04/30/25 1835   Pulse 80 04/30/25 1849   Resp 14 04/30/25 1849   SpO2 99 % 04/30/25 1849   Vitals shown include unfiled device data.    Electronically Signed By: KINGA Duong CRNA  April 30, 2025  6:50 PM

## 2025-04-30 NOTE — ED PROVIDER NOTES
History     Chief Complaint   Patient presents with    Abdominal Pain     HPI  Summer Smith is a 44 year old female who presents for right lower quadrant abdominal pain.  The patient says that this pain started last night and has been getting worse throughout the day.  She has had some nausea and anorexia.  No diarrhea.  No dysuria urinary frequency.  She has had a prior hysterectomy but no other abdominal surgeries.  No fevers or chills or cough or difficulty breathing    Allergies:  Allergies   Allergen Reactions    Phytonadione Anaphylaxis     IV Vitamin K    Sulfa Antibiotics Hives    Vitamin K Anaphylaxis     IV Vitamin K only    Penicillin G Rash    Amoxicillin-Pot Clavulanate Rash       Problem List:    Patient Active Problem List    Diagnosis Date Noted    Thyroid nodule 03/12/2021     Priority: Medium     Repeat ultrasound needed March 2022      Hepatic steatosis 01/16/2020     Priority: Medium    Left leg swelling 01/03/2017     Priority: Medium    History of pulmonary embolism 01/03/2017     Priority: Medium    Postoperative hemorrhage involving genitourinary system following genitourinary procedure 12/27/2016     Priority: Medium     Significant vaginal bleeding after hysterectomy, requiring hospitalization at [facility].      Morbid obesity due to excess calories (H) 12/27/2016     Priority: Medium    History of hysterectomy 12/27/2016     Priority: Medium    Pericardial cyst 12/27/2016     Priority: Medium    Anticoagulation monitoring, INR range 2-3 10/31/2016     Priority: Medium    Mild intermittent reactive airway disease without complication 09/17/2015     Priority: Medium     Abnormal PFT 2012, started on maintenance inhaler - [facility]  Symptoms with URIs      Varicose veins of legs 09/17/2015     Priority: Medium     Laser surgery left leg 2012 and left iliac vein stent placement 2012      Personal history of DVT (deep vein thrombosis) 08/26/2015     Priority: Medium     In 2007. Due to  May-Thurner syndrome and being on OCP.  On warfarin for 6 months.  With pregnancies she did heparin injections followed by post-partum warfarin.      Recurrent pulmonary emboli (H) 08/26/2015     Priority: Medium     2007 12/2016 after hysterectomy, complicated by vaginal bleeding, needing to be off anti-coagulation.  Following with Dr. White at [facility].  Recommends indefinite anticoagulation.       May-Thurner syndrome 08/26/2015     Priority: Medium     History of vein stenting in iliac/femoral area.          Past Medical History:    Past Medical History:   Diagnosis Date    Asthma     Cardiac murmur     DVT (deep vein thrombosis) in pregnancy 2007    DVT (deep venous thrombosis) (H) 2007    May-Thurner syndrome     Menorrhagia     Pulmonary embolism (H) 2007    Pulmonary embolism (H) 2007       Past Surgical History:    Past Surgical History:   Procedure Laterality Date    ABLATION SAPHENOUS VEIN W/ RFA      AS LIGATN SHORT SAPHEN      had great saphenous vein removal    DAVINCI HYSTERECTOMY TOTAL      tubes removed, ovaries in place    HYSTERECTOMY  11/03/2016    IR EXTREMITY VENOGRAM LEFT  12/1/2016    OTHER SURGICAL HISTORY  2009    stenting into pelvis    OTHER SURGICAL HISTORY Left     saphenous vein removed    OTHER SURGICAL HISTORY      wisdom teeth extractions    STENT      venous in leg       Family History:    Family History   Problem Relation Age of Onset    Thyroid Disease Mother         partial lobectomy    Diabetes Father     Hypertension Father     Hyperlipidemia Father     Colon Cancer Maternal Grandfather     Liver Cancer Maternal Grandfather     Cancer Paternal Grandfather     Thyroid Disease Sister     Thyroid Disease Sister     No Known Problems Sister     No Known Problems Sister     Endocrine Disease Daughter         Lucille, has appointment end of July 2016    Autism Spectrum Disorder Daughter     Spina bifida Daughter        Social History:  Marital Status:   [2]  Social  "History     Tobacco Use    Smoking status: Never    Smokeless tobacco: Never   Vaping Use    Vaping status: Never Used   Substance Use Topics    Alcohol use: Yes     Comment: rare    Drug use: No        Medications:    albuterol (PROAIR HFA/PROVENTIL HFA/VENTOLIN HFA) 108 (90 Base) MCG/ACT inhaler  celecoxib (CELEBREX) 100 MG capsule  cetirizine (ZYRTEC) 10 MG tablet  fluticasone (FLONASE) 50 MCG/ACT nasal spray  Levocarnitine 500 MG TABS  metFORMIN (GLUCOPHAGE XR) 500 MG 24 hr tablet  Multiple Vitamin (MULTIVITAMIN ADULT PO)  phentermine 30 MG capsule  rivaroxaban ANTICOAGULANT (XARELTO ANTICOAGULANT) 20 MG TABS tablet  vitamin D3 (CHOLECALCIFEROL) 2000 units (50 mcg) tablet          Review of Systems    Physical Exam   BP: (!) 165/96  Pulse: 74  Temp: 98.4  F (36.9  C)  Resp: 16  Height: 165.1 cm (5' 5\")  Weight: 121.6 kg (268 lb)  SpO2: 97 %      Physical Exam  Constitutional:       General: She is not in acute distress.     Appearance: She is well-developed.   HENT:      Head: Normocephalic and atraumatic.   Cardiovascular:      Rate and Rhythm: Normal rate.   Pulmonary:      Effort: No respiratory distress.      Breath sounds: No stridor.   Abdominal:      Tenderness: There is abdominal tenderness in the right lower quadrant. There is guarding.   Skin:     General: Skin is warm and dry.   Neurological:      Mental Status: She is alert.         ED Course        Procedures              Critical Care time:  none     IV Antibiotics given and/or elevated Lactate of 0 and no sepsis note found - Delete this reminder and enter the sepsis note or '.edcms' before signing chart.>>>None         Results for orders placed or performed during the hospital encounter of 04/30/25 (from the past 24 hours)   Basic metabolic panel   Result Value Ref Range    Sodium 138 135 - 145 mmol/L    Potassium 4.4 3.4 - 5.3 mmol/L    Chloride 105 98 - 107 mmol/L    Carbon Dioxide (CO2) 25 22 - 29 mmol/L    Anion Gap 8 7 - 15 mmol/L    Urea " Nitrogen 11.0 6.0 - 20.0 mg/dL    Creatinine 0.76 0.51 - 0.95 mg/dL    GFR Estimate >90 >60 mL/min/1.73m2    Calcium 9.3 8.8 - 10.4 mg/dL    Glucose 97 70 - 99 mg/dL   CBC with Platelets & Differential    Narrative    The following orders were created for panel order CBC with Platelets & Differential.  Procedure                               Abnormality         Status                     ---------                               -----------         ------                     CBC with platelets and ...[[id number]]                      Final result                 Please view results for these tests on the individual orders.   CBC with platelets and differential   Result Value Ref Range    WBC Count 7.0 4.0 - 11.0 10e3/uL    RBC Count 4.98 3.80 - 5.20 10e6/uL    Hemoglobin 14.0 11.7 - 15.7 g/dL    Hematocrit 42.8 35.0 - 47.0 %    MCV 86 78 - 100 fL    MCH 28.1 26.5 - 33.0 pg    MCHC 32.7 31.5 - 36.5 g/dL    RDW 11.9 10.0 - 15.0 %    Platelet Count 323 150 - 450 10e3/uL    % Neutrophils 53 %    % Lymphocytes 33 %    % Monocytes 10 %    % Eosinophils 2 %    % Basophils 1 %    % Immature Granulocytes 1 %    NRBCs per 100 WBC 0 <1 /100    Absolute Neutrophils 3.7 1.6 - 8.3 10e3/uL    Absolute Lymphocytes 2.3 0.8 - 5.3 10e3/uL    Absolute Monocytes 0.7 0.0 - 1.3 10e3/uL    Absolute Eosinophils 0.1 0.0 - 0.7 10e3/uL    Absolute Basophils 0.1 0.0 - 0.2 10e3/uL    Absolute Immature Granulocytes 0.0 <=0.4 10e3/uL    Absolute NRBCs 0.0 10e3/uL   CT Abdomen Pelvis w Contrast    Narrative    EXAM: CT ABDOMEN PELVIS W CONTRAST  LOCATION: [facility]  DATE: 4/30/2025    INDICATION: Right lower quadrant abdominal pain  COMPARISON: Renal ultrasound 5/5/2024, CT abdomen pelvis 3/17/2024.  TECHNIQUE: CT scan of the abdomen and pelvis was performed following injection of IV contrast. Multiplanar reformats were obtained. Dose reduction techniques were used.  CONTRAST: 131ml isovue 370    FINDINGS:   LOWER CHEST:  No significant interval change of the simple cystic lesion along the right heart border which is been present dating back to at least 5/2018, in keeping with a benign pericardial cyst.    HEPATOBILIARY: Hepatic steatosis without suspicious lesion. Normal gallbladder and bile ducts.    PANCREAS: Normal.    SPLEEN: Normal.    ADRENAL GLANDS: Normal.    KIDNEYS/BLADDER: No significant mass, stone, or hydronephrosis.    BOWEL: The mid to distal appendix is newly dilated to 11 mm with some faint mucosal hyperenhancement. Suspect minimal periappendiceal fat stranding with and thickening of the subjacent peritoneal surface. No evidence of microperforation or   intra-abdominal abscess. Otherwise, the bowel is nondistended. Minimal colonic diverticulosis. No ascites.    LYMPH NODES: No lymphadenopathy.    VASCULATURE: Mild abdominal aorta atherosclerosis without aneurysmal dilation. Left common and external iliac venous stenting.    PELVIC ORGANS: Hysterectomy. Normal ovaries. No suspicious pelvic mass.    MUSCULOSKELETAL: No aggressive osseous lesion. Unchanged well-corticated lesion of the left femoral neck.      Impression    IMPRESSION:   1.  The constellation of findings are most suggestive of an early acute and uncomplicated appendicitis.  2.  Hepatic steatosis.       Medications   HYDROmorphone (PF) (DILAUDID) injection 0.5 mg (0.5 mg Intravenous $Given 4/30/25 1629)   ciprofloxacin (CIPRO) infusion 400 mg (400 mg Intravenous $New Bag 4/30/25 1626)   metroNIDAZOLE (FLAGYL) infusion 500 mg (has no administration in time range)   pharmacy alert - intermittent dosing (has no administration in time range)   lactated ringers BOLUS 1,000 mL (1,000 mLs Intravenous $New Bag 4/30/25 1628)   iopamidol (ISOVUE-370) solution 131 mL (131 mLs Intravenous $Given 4/30/25 1515)   sodium chloride 0.9 % bag for CT scan flush (73 mLs Intravenous $Given 4/30/25 1515)   ondansetron (ZOFRAN) injection 4 mg (4 mg Intravenous $Given 4/30/25  5885)       Assessments & Plan (with Medical Decision Making)   44-year-old female presents with right lower quadrant abdominal pain with nausea and anorexia.  She is tender in the right lower quadrant.  High risk presentation for surgical process such as appendicitis.  She is given IV hydromorphone and ondansetron for the pain.  White blood cell count is 7 and hemoglobin is 14.  Electrolytes are within normal limits.  CT of the abdomen and pelvis obtained, images interpreted independently as well as radiology read reviewed, appears to have acute appendicitis.  I have discussed the case with the on-call surgeon, Dr. Klein.  We discussed ongoing management the patient and he has come and evaluate the patient in the emergency department is asked for me to give her ciprofloxacin and metronidazole and he will bring her to the operating room later today.    I have reviewed the nursing notes.    I have reviewed the findings, diagnosis, plan and need for follow up with the patient.           Medical Decision Making  The patient's presentation was of high complexity (an acute health issue posing potential threat to life or bodily function).    The patient's evaluation involved:  ordering and/or review of 3+ test(s) in this encounter (see separate area of note for details)  independent interpretation of testing performed by another health professional (see separate area of note for details)    The patient's management necessitated high risk (a parenteral controlled substance).        New Prescriptions    No medications on file       Final diagnoses:   Acute appendicitis with localized peritonitis, without perforation, abscess, or gangrene       4/30/2025   [facility] EMERGENCY DEPT       Slim Marquez MD  04/30/25 5395

## 2025-04-30 NOTE — DISCHARGE INSTRUCTIONS
Same Day Surgery Discharge Instructions  Special Precautions After Surgery - Adult    It is not unusual to feel lightheaded or faint, up to 24 hours after surgery or while taking pain medication.  If you have these symptoms; sit for a few minutes before standing and have someone assist you when getting up.  You should rest and relax for the next 24 hours and must have someone stay with you for at least 24 hours after your discharge.  DO NOT DRIVE any vehicle or operate mechanical equipment for 24 hours following the end of your surgery.  DO NOT DRIVE while taking narcotic pain medications that have been prescribed by your physician.  If you had a limb operated on, you must be able to use it fully to drive.  DO NOT drink alcoholic beverages for 24 hours following surgery or while taking prescription pain medication.  Drink clear liquids (apple juice, ginger ale, broth, 7-Up, etc.).  Progress to your regular diet as you feel able.  Any questions call your physician and do not make important decisions for 24 hours.    Nausea and Vomiting: Nausea and vomiting can occur any time after receiving anesthesia. If you experience nausea and vomiting we encourage you to move to a clear liquid diet and advance your diet as tolerated. If nausea and vomiting do not improve within 12 hours please call the surgeon or present to the Emergency department.     Break-through Bleeding: If your experience bleeding from your surgical site apply pressure and additional dressing per nurse instruction. For simple problems such as a saturated dressing, you may need to reinforce the dressing with more gauze and tape and put slight pressure on the site. If bleeding does not subside contact the surgeon or present to the Emergency Department.    Post-op Infection: If you develop a fever of 100.4 or greater, have pus like drainage, redness, swelling or severe pain at the surgical site not alleviated with pain medications; please  contact the surgeon or present to the Emergency Department.     Medications:  Acetaminophen (Tylenol):  Next dose: 11:00pm.  Ibuprofen (Motrin, Advil):  Next dose: anytime.  oxycodone:  Next dose: anytime.  Follow the instructions on the bottle.  __________________________________________________________________________________________________________________________________  IMPORTANT NUMBERS:    [facility] [place]  [phone], [phone]  Pharmacy:  [phone]  Same Day Surgery:  [phone], for general post-op questions call Monday - Thursday until 8:30 p.m., Fridays until 6:00 p.m.   Mental Health Mobile Crisis line: [phone]                                                                      General Surgery:  [phone]

## 2025-04-30 NOTE — ED TRIAGE NOTES
RLQ abd pain, started last NOC at 2130.  No relief with ibuprofen.  Pt still has ovaries, appendix and gallbladder.  No urinary symptoms.  Nauseous.  Has had 1 episode of loose stools. No emesis.      Triage Assessment (Adult)       Row Name 04/30/25 1341          Triage Assessment    Airway WDL WDL        Respiratory WDL    Respiratory WDL WDL        Skin Circulation/Temperature WDL    Skin Circulation/Temperature WDL WDL        Cardiac WDL    Cardiac WDL WDL        Peripheral/Neurovascular WDL    Peripheral Neurovascular WDL WDL        Cognitive/Neuro/Behavioral WDL    Cognitive/Neuro/Behavioral WDL WDL

## 2025-04-30 NOTE — ANESTHESIA CARE TRANSFER NOTE
Patient: Summer Smith    Procedure: Procedure(s):  APPENDECTOMY, LAPAROSCOPIC       Diagnosis: Acute appendicitis with localized peritonitis, without perforation, abscess, or gangrene [K35.30]  Diagnosis Additional Information: No value filed.    Anesthesia Type:   No value filed.     Note:    Oropharynx: oropharynx clear of all foreign objects  Level of Consciousness: awake      Independent Airway: airway patency satisfactory and stable  Dentition: dentition unchanged  Vital Signs Stable: post-procedure vital signs reviewed and stable  Report to RN Given: handoff report given  Patient transferred to: Phase II    Handoff Report: Identifed the Patient, Identified the Reponsible Provider, Reviewed the pertinent medical history, Discussed the surgical course, Reviewed Intra-OP anesthesia mangement and issues during anesthesia, Set expectations for post-procedure period and Allowed opportunity for questions and acknowledgement of understanding      Vitals:  Vitals Value Taken Time   /94 04/30/25 1845   Temp 36.6  C (97.8  F) 04/30/25 1835   Pulse 79 04/30/25 1848   Resp 10 04/30/25 1848   SpO2 99 % 04/30/25 1848   Vitals shown include unfiled device data.    Electronically Signed By: KINGA Duong CRNA  April 30, 2025  6:49 PM

## 2025-04-30 NOTE — ANESTHESIA PREPROCEDURE EVALUATION
Anesthesia Pre-Procedure Evaluation    Patient: Summer Smith   MRN: [id number] : 1980        Procedure : Procedure(s):  APPENDECTOMY, LAPAROSCOPIC          Past Medical History:   Diagnosis Date    Asthma     Cardiac murmur     DVT (deep vein thrombosis) in pregnancy     DVT (deep venous thrombosis) (H)     May-Thurner syndrome     Menorrhagia     Pulmonary embolism (H)     no long term anticoagulation, no recurrence    Pulmonary embolism (H)       Past Surgical History:   Procedure Laterality Date    ABLATION SAPHENOUS VEIN W/ RFA      AS LIGATN SHORT SAPHEN      had great saphenous vein removal    DAVINCI HYSTERECTOMY TOTAL      tubes removed, ovaries in place    HYSTERECTOMY  2016    IR EXTREMITY VENOGRAM LEFT  2016    OTHER SURGICAL HISTORY  2009    stenting into pelvis    OTHER SURGICAL HISTORY Left     saphenous vein removed    OTHER SURGICAL HISTORY      wisdom teeth extractions    STENT      venous in leg      Allergies   Allergen Reactions    Phytonadione Anaphylaxis     IV Vitamin K    Sulfa Antibiotics Hives    Vitamin K Anaphylaxis     IV Vitamin K only    Penicillin G Rash    Amoxicillin-Pot Clavulanate Rash      Social History     Tobacco Use    Smoking status: Never    Smokeless tobacco: Never   Substance Use Topics    Alcohol use: Yes     Comment: rare      Wt Readings from Last 1 Encounters:   25 121.6 kg (268 lb)        Anesthesia Evaluation            ROS/MED HX  ENT/Pulmonary:  - neg pulmonary ROS   (+)                      asthma                  Neurologic:  - neg neurologic ROS     Cardiovascular:  - neg cardiovascular ROS     METS/Exercise Tolerance:     Hematologic:  - neg hematologic  ROS     Musculoskeletal:  - neg musculoskeletal ROS     GI/Hepatic:  - neg GI/hepatic ROS   (+)             liver disease,       Renal/Genitourinary:  - neg Renal ROS     Endo:  - neg endo ROS   (+)          thyroid problem,     Obesity,       Psychiatric/Substance  Use:  - neg psychiatric ROS     Infectious Disease:  - neg infectious disease ROS     Malignancy:       Other:  - neg other ROS          Physical Exam    Airway        Mallampati: II   TM distance: > 3 FB   Neck ROM: full   Mouth opening: > 3 cm    Respiratory Devices and Support  Comment: Not on oxygen currently       Dental  no notable dental history         Cardiovascular   cardiovascular exam normal          Pulmonary   pulmonary exam normal                OUTSIDE LABS:  CBC:   Lab Results   Component Value Date    WBC 7.0 04/30/2025    WBC 7.7 03/20/2024    HGB 14.0 04/30/2025    HGB 15.1 03/20/2024    HCT 42.8 04/30/2025    HCT 47.6 (H) 03/20/2024     04/30/2025     03/20/2024     BMP:   Lab Results   Component Value Date     04/30/2025     04/17/2024    POTASSIUM 4.4 04/30/2025    POTASSIUM 4.6 04/17/2024    CHLORIDE 105 04/30/2025    CHLORIDE 102 04/17/2024    CO2 25 04/30/2025    CO2 30 (H) 04/17/2024    BUN 11.0 04/30/2025    BUN 8.2 04/17/2024    CR 0.76 04/30/2025    CR 0.75 04/17/2024    GLC 97 04/30/2025    GLC 92 04/17/2024     COAGS:   Lab Results   Component Value Date    PTT 41 (H) 01/16/2020    INR 1.03 01/16/2020     POC:   Lab Results   Component Value Date    BGM 97 06/20/2017     HEPATIC:   Lab Results   Component Value Date    ALBUMIN 4.1 04/17/2024    PROTTOTAL 7.3 04/17/2024    ALT 24 04/17/2024    AST 18 04/17/2024    ALKPHOS 60 04/17/2024    BILITOTAL 0.3 04/17/2024     OTHER:   Lab Results   Component Value Date    A1C 5.8 (H) 04/17/2024    SARAH 9.3 04/30/2025    LIPASE 79 03/03/2021    TSH 2.53 04/17/2024    T4 0.99 03/02/2021    CRP 13.7 (H) 07/15/2020    SED 12 07/15/2020       Anesthesia Plan    ASA Status:  3       Anesthesia Type: General.     - Airway: ETT   Induction: Intravenous, Propofol.   Maintenance: TIVA.        Consents    Anesthesia Plan(s) and associated risks, benefits, and realistic alternatives discussed. Questions answered and  "patient/representative(s) expressed understanding.     - Discussed: Risks, Benefits and Alternatives for BOTH SEDATION and the PROCEDURE were discussed     - Discussed with:  Patient            Postoperative Care    Pain management: IV analgesics, Oral pain medications, Multi-modal analgesia.   PONV prophylaxis: Ondansetron (or other 5HT-3), Dexamethasone or Solumedrol     Comments:    Other Comments: Patient aware of plan, what to expect, and potential risks. Timeout was performed before bringing the patient back to OR, and once again, patient was asked if they had any questions           KINGA Duong CRNA    Clinically Significant Risk Factors Present on Admission                # Drug Induced Coagulation Defect: home medication list includes an anticoagulant medication              # Morbid Obesity: Estimated body mass index is 44.6 kg/m  as calculated from the following:    Height as of this encounter: 1.651 m (5' 5\").    Weight as of this encounter: 121.6 kg (268 lb).       # Asthma: noted on problem list          "

## 2025-04-30 NOTE — ANESTHESIA PROCEDURE NOTES
Airway         Procedure Start/Stop Times: 4/30/2025 5:18 PM and 4/30/2025 5:18 PM  Staff -        CRNA: Fabian Quach APRN CRNA       Performed By: CRNA  Consent for Airway        Urgency: elective  Indications and Patient Condition       Indications for airway management: florina-procedural and airway protection         Mask difficulty assessment: 2 - vent by mask + OA or adjuvant +/- NMBA    Final Airway Details       Final airway type: endotracheal airway       Successful airway: ETT - single  Endotracheal Airway Details        ETT size (mm): 7.0       Cuffed: yes       Successful intubation technique: video laryngoscopy       VL Blade Size: [place] 3       Grade View of Cords: 1       Adjucts: stylet       Position: Center       Measured from: gums/teeth       Secured at (cm): 21       Bite block used: None    Post intubation assessment        Placement verified by: capnometry        Number of attempts at approach: 1       Secured with: plastic tape       Ease of procedure: easy       Dentition: Intact    Medication(s) Administered   Medication Administration Time: 4/30/2025 5:18 PM

## 2025-05-07 LAB
PATH REPORT.COMMENTS IMP SPEC: NORMAL
PATH REPORT.FINAL DX SPEC: NORMAL
PATH REPORT.GROSS SPEC: NORMAL
PATH REPORT.MICROSCOPIC SPEC OTHER STN: NORMAL
PATH REPORT.RELEVANT HX SPEC: NORMAL
PHOTO IMAGE: NORMAL

## 2025-05-07 PROCEDURE — 88304 TISSUE EXAM BY PATHOLOGIST: CPT | Mod: 26 | Performed by: PATHOLOGY

## 2025-05-19 ENCOUNTER — TELEPHONE (OUTPATIENT)
Dept: FAMILY MEDICINE | Facility: CLINIC | Age: 45
End: 2025-05-19
Payer: COMMERCIAL

## 2025-05-19 NOTE — TELEPHONE ENCOUNTER
PRIOR AUTHORIZATION DENIED    Medication: PHENTERMINE HCL 15 MG PO CAPS  Insurance Company: [employer] Ph: [phone]  Denial Date: 5/19/2025  Denial Reason(s): Did not meet criteria for coverage        Appeal Information:       Patient Notified: No

## 2025-06-17 DIAGNOSIS — I26.99 RECURRENT PULMONARY EMBOLI (H): ICD-10-CM

## 2025-06-17 RX ORDER — RIVAROXABAN 20 MG/1
20 TABLET, FILM COATED ORAL
Qty: 90 TABLET | Refills: 0 | Status: SHIPPED | OUTPATIENT
Start: 2025-06-17

## (undated) DEVICE — ESU ENDO SCISSORS 5MM CVD 5DCS

## (undated) DEVICE — GLOVE BIOGEL PI MICRO SZ 7.0 48570

## (undated) DEVICE — SU DERMABOND ADVANCED .7ML DNX12

## (undated) DEVICE — SU PDS II 0 ENDOLOOP EZ10G

## (undated) DEVICE — ENDO TROCAR SLEEVE KII ADV FIXATION 05X100MM CFS02

## (undated) DEVICE — ANTIFOG SOLUTION SEE SHARP 150M TROCAR SWABS 30978

## (undated) DEVICE — SOL WATER IRRIG 1000ML BOTTLE 07139-09

## (undated) DEVICE — ENDO POUCH UNIVERSAL RETRIEVAL SYSTEM INZII 5MM CD003

## (undated) DEVICE — DECANTER VIAL 2006S

## (undated) DEVICE — ENDO TROCAR FIRST ENTRY KII FIOS ADV FIX 05X100MM CFF03

## (undated) DEVICE — DRAPE TIBURON GENERAL ENDOSCOPY 9458

## (undated) DEVICE — SU MONOCRYL 4-0 PS-2 18" UND Y496G

## (undated) DEVICE — DRAPE POUCH INSTRUMENT 3 POCKET 1018L

## (undated) DEVICE — SOL NACL 0.9% IRRIG 1000ML BOTTLE 07138-09

## (undated) DEVICE — STOCKING SLEEVE COMPRESSION CALF MED

## (undated) DEVICE — Device

## (undated) DEVICE — ESU HOLSTER PLASTIC DISP E2400

## (undated) DEVICE — SUCTION MANIFOLD NEPTUNE 2 SYS 1 PORT 702-025-000

## (undated) DEVICE — ESU LIGASURE MARYLAND LAPAROSCOPIC SLR/DVDR 5MMX37CM LF1937

## (undated) DEVICE — GOWN XLG DISP 9545

## (undated) DEVICE — GLOVE BIOGEL PI MICRO INDICATOR UNDERGLOVE SZ 7.5 48975

## (undated) DEVICE — PREP CHLORAPREP 26ML TINTED ORANGE  260815

## (undated) RX ORDER — HEPARIN SODIUM 5000 [USP'U]/.5ML
INJECTION, SOLUTION INTRAVENOUS; SUBCUTANEOUS
Status: DISPENSED
Start: 2025-04-30

## (undated) RX ORDER — ACETAMINOPHEN 325 MG/1
TABLET ORAL
Status: DISPENSED
Start: 2025-04-30

## (undated) RX ORDER — BUPIVACAINE HYDROCHLORIDE 5 MG/ML
INJECTION, SOLUTION EPIDURAL; INTRACAUDAL; PERINEURAL
Status: DISPENSED
Start: 2025-04-30

## (undated) RX ORDER — ONDANSETRON 2 MG/ML
INJECTION INTRAMUSCULAR; INTRAVENOUS
Status: DISPENSED
Start: 2025-04-30

## (undated) RX ORDER — DIPHENHYDRAMINE HYDROCHLORIDE 50 MG/ML
INJECTION, SOLUTION INTRAMUSCULAR; INTRAVENOUS
Status: DISPENSED
Start: 2025-04-30

## (undated) RX ORDER — METHOCARBAMOL 500 MG/1
TABLET, FILM COATED ORAL
Status: DISPENSED
Start: 2025-04-30

## (undated) RX ORDER — LIDOCAINE HYDROCHLORIDE AND EPINEPHRINE 10; 10 MG/ML; UG/ML
INJECTION, SOLUTION INFILTRATION; PERINEURAL
Status: DISPENSED
Start: 2025-04-30

## (undated) RX ORDER — FENTANYL CITRATE 50 UG/ML
INJECTION, SOLUTION INTRAMUSCULAR; INTRAVENOUS
Status: DISPENSED
Start: 2025-04-30